# Patient Record
Sex: MALE | URBAN - METROPOLITAN AREA
[De-identification: names, ages, dates, MRNs, and addresses within clinical notes are randomized per-mention and may not be internally consistent; named-entity substitution may affect disease eponyms.]

---

## 2017-01-01 ENCOUNTER — ONCOLOGY VISIT (OUTPATIENT)
Dept: ONCOLOGY | Facility: CLINIC | Age: 42
End: 2017-01-01
Attending: INTERNAL MEDICINE
Payer: COMMERCIAL

## 2017-01-01 ENCOUNTER — CARE COORDINATION (OUTPATIENT)
Dept: ONCOLOGY | Facility: CLINIC | Age: 42
End: 2017-01-01

## 2017-01-01 ENCOUNTER — APPOINTMENT (OUTPATIENT)
Dept: RADIATION ONCOLOGY | Facility: CLINIC | Age: 42
End: 2017-01-01
Attending: RADIOLOGY
Payer: COMMERCIAL

## 2017-01-01 ENCOUNTER — INFUSION THERAPY VISIT (OUTPATIENT)
Dept: ONCOLOGY | Facility: CLINIC | Age: 42
End: 2017-01-01
Attending: NURSE PRACTITIONER
Payer: COMMERCIAL

## 2017-01-01 ENCOUNTER — APPOINTMENT (OUTPATIENT)
Dept: LAB | Facility: CLINIC | Age: 42
End: 2017-01-01
Attending: INTERNAL MEDICINE
Payer: COMMERCIAL

## 2017-01-01 ENCOUNTER — HOSPITAL ENCOUNTER (OUTPATIENT)
Dept: MRI IMAGING | Facility: CLINIC | Age: 42
Discharge: HOME | End: 2017-01-03
Attending: INTERNAL MEDICINE | Admitting: INTERNAL MEDICINE
Payer: COMMERCIAL

## 2017-01-01 ENCOUNTER — HOSPITAL ENCOUNTER (OUTPATIENT)
Dept: MEDSURG UNIT | Facility: CLINIC | Age: 42
End: 2017-03-28
Attending: RADIOLOGY
Payer: COMMERCIAL

## 2017-01-01 ENCOUNTER — HOSPITAL ENCOUNTER (OUTPATIENT)
Dept: MRI IMAGING | Facility: CLINIC | Age: 42
End: 2017-01-03
Attending: INTERNAL MEDICINE
Payer: COMMERCIAL

## 2017-01-01 ENCOUNTER — TELEPHONE (OUTPATIENT)
Dept: ONCOLOGY | Facility: CLINIC | Age: 42
End: 2017-01-01

## 2017-01-01 ENCOUNTER — PRE VISIT (OUTPATIENT)
Dept: RADIATION ONCOLOGY | Facility: CLINIC | Age: 42
End: 2017-01-01

## 2017-01-01 ENCOUNTER — TRANSFERRED RECORDS (OUTPATIENT)
Dept: HEALTH INFORMATION MANAGEMENT | Facility: CLINIC | Age: 42
End: 2017-01-01

## 2017-01-01 ENCOUNTER — ONCOLOGY VISIT (OUTPATIENT)
Dept: RADIATION ONCOLOGY | Facility: CLINIC | Age: 42
End: 2017-01-01

## 2017-01-01 ENCOUNTER — HOSPITAL ENCOUNTER (OUTPATIENT)
Dept: MRI IMAGING | Facility: CLINIC | Age: 42
End: 2017-04-07
Attending: INTERNAL MEDICINE
Payer: COMMERCIAL

## 2017-01-01 ENCOUNTER — HOSPITAL ENCOUNTER (OUTPATIENT)
Dept: MRI IMAGING | Facility: CLINIC | Age: 42
End: 2017-01-03
Attending: NURSE PRACTITIONER
Payer: COMMERCIAL

## 2017-01-01 ENCOUNTER — TELEPHONE (OUTPATIENT)
Dept: PALLIATIVE CARE | Facility: CLINIC | Age: 42
End: 2017-01-01

## 2017-01-01 ENCOUNTER — TELEPHONE (OUTPATIENT)
Dept: RADIATION ONCOLOGY | Facility: CLINIC | Age: 42
End: 2017-01-01

## 2017-01-01 ENCOUNTER — HOSPITAL ENCOUNTER (OUTPATIENT)
Dept: MRI IMAGING | Facility: CLINIC | Age: 42
Discharge: HOME | End: 2017-03-28
Attending: RADIOLOGY | Admitting: RADIOLOGY
Payer: COMMERCIAL

## 2017-01-01 ENCOUNTER — RESEARCH ENCOUNTER (OUTPATIENT)
Dept: ONCOLOGY | Facility: CLINIC | Age: 42
End: 2017-01-01

## 2017-01-01 ENCOUNTER — HOSPITAL ENCOUNTER (OUTPATIENT)
Dept: MRI IMAGING | Facility: CLINIC | Age: 42
Discharge: HOME | End: 2017-03-06
Attending: NURSE PRACTITIONER | Admitting: NURSE PRACTITIONER
Payer: COMMERCIAL

## 2017-01-01 ENCOUNTER — OFFICE VISIT (OUTPATIENT)
Dept: NEUROSURGERY | Facility: CLINIC | Age: 42
End: 2017-01-01
Attending: NEUROLOGICAL SURGERY
Payer: COMMERCIAL

## 2017-01-01 ENCOUNTER — HOSPITAL ENCOUNTER (OUTPATIENT)
Dept: MRI IMAGING | Facility: CLINIC | Age: 42
Discharge: HOME | End: 2017-04-07
Attending: INTERNAL MEDICINE | Admitting: INTERNAL MEDICINE
Payer: COMMERCIAL

## 2017-01-01 ENCOUNTER — HOSPITAL ENCOUNTER (OUTPATIENT)
Dept: PET IMAGING | Facility: CLINIC | Age: 42
Discharge: HOME | End: 2017-02-06
Attending: INTERNAL MEDICINE | Admitting: INTERNAL MEDICINE
Payer: COMMERCIAL

## 2017-01-01 ENCOUNTER — OFFICE VISIT (OUTPATIENT)
Dept: PALLIATIVE CARE | Facility: CLINIC | Age: 42
End: 2017-01-01
Attending: FAMILY MEDICINE
Payer: COMMERCIAL

## 2017-01-01 VITALS
HEART RATE: 112 BPM | WEIGHT: 221.7 LBS | SYSTOLIC BLOOD PRESSURE: 103 MMHG | OXYGEN SATURATION: 99 % | DIASTOLIC BLOOD PRESSURE: 66 MMHG | BODY MASS INDEX: 30.07 KG/M2

## 2017-01-01 VITALS
WEIGHT: 229.5 LBS | DIASTOLIC BLOOD PRESSURE: 84 MMHG | HEART RATE: 107 BPM | TEMPERATURE: 97.9 F | SYSTOLIC BLOOD PRESSURE: 133 MMHG | OXYGEN SATURATION: 100 % | BODY MASS INDEX: 29.47 KG/M2

## 2017-01-01 VITALS
BODY MASS INDEX: 32.44 KG/M2 | HEIGHT: 74 IN | OXYGEN SATURATION: 100 % | RESPIRATION RATE: 20 BRPM | SYSTOLIC BLOOD PRESSURE: 130 MMHG | DIASTOLIC BLOOD PRESSURE: 85 MMHG | WEIGHT: 252.8 LBS | HEART RATE: 100 BPM | TEMPERATURE: 97 F

## 2017-01-01 VITALS
TEMPERATURE: 96.7 F | OXYGEN SATURATION: 98 % | DIASTOLIC BLOOD PRESSURE: 98 MMHG | HEART RATE: 129 BPM | SYSTOLIC BLOOD PRESSURE: 146 MMHG | BODY MASS INDEX: 29.34 KG/M2 | RESPIRATION RATE: 16 BRPM | WEIGHT: 228.5 LBS

## 2017-01-01 VITALS
BODY MASS INDEX: 32.67 KG/M2 | DIASTOLIC BLOOD PRESSURE: 85 MMHG | OXYGEN SATURATION: 99 % | WEIGHT: 254.6 LBS | SYSTOLIC BLOOD PRESSURE: 125 MMHG | HEART RATE: 91 BPM | TEMPERATURE: 97.4 F | RESPIRATION RATE: 18 BRPM

## 2017-01-01 VITALS
OXYGEN SATURATION: 99 % | TEMPERATURE: 98.3 F | SYSTOLIC BLOOD PRESSURE: 116 MMHG | HEART RATE: 91 BPM | RESPIRATION RATE: 16 BRPM | DIASTOLIC BLOOD PRESSURE: 82 MMHG

## 2017-01-01 VITALS
DIASTOLIC BLOOD PRESSURE: 84 MMHG | WEIGHT: 241.6 LBS | TEMPERATURE: 98.3 F | OXYGEN SATURATION: 99 % | SYSTOLIC BLOOD PRESSURE: 141 MMHG | RESPIRATION RATE: 16 BRPM | BODY MASS INDEX: 31.01 KG/M2 | HEART RATE: 120 BPM

## 2017-01-01 VITALS
TEMPERATURE: 96.7 F | HEART RATE: 127 BPM | SYSTOLIC BLOOD PRESSURE: 126 MMHG | HEIGHT: 72 IN | OXYGEN SATURATION: 99 % | DIASTOLIC BLOOD PRESSURE: 82 MMHG | RESPIRATION RATE: 18 BRPM | WEIGHT: 244.3 LBS | BODY MASS INDEX: 33.09 KG/M2

## 2017-01-01 VITALS
OXYGEN SATURATION: 100 % | SYSTOLIC BLOOD PRESSURE: 124 MMHG | TEMPERATURE: 98.4 F | DIASTOLIC BLOOD PRESSURE: 76 MMHG | RESPIRATION RATE: 16 BRPM | HEART RATE: 103 BPM

## 2017-01-01 VITALS — WEIGHT: 215 LBS | BODY MASS INDEX: 29.16 KG/M2

## 2017-01-01 VITALS
RESPIRATION RATE: 16 BRPM | OXYGEN SATURATION: 99 % | TEMPERATURE: 98.4 F | DIASTOLIC BLOOD PRESSURE: 78 MMHG | HEART RATE: 90 BPM | SYSTOLIC BLOOD PRESSURE: 112 MMHG

## 2017-01-01 VITALS
DIASTOLIC BLOOD PRESSURE: 74 MMHG | SYSTOLIC BLOOD PRESSURE: 134 MMHG | HEART RATE: 99 BPM | OXYGEN SATURATION: 99 % | TEMPERATURE: 98.3 F | RESPIRATION RATE: 16 BRPM

## 2017-01-01 VITALS
SYSTOLIC BLOOD PRESSURE: 135 MMHG | TEMPERATURE: 97.5 F | HEART RATE: 115 BPM | OXYGEN SATURATION: 97 % | BODY MASS INDEX: 29.54 KG/M2 | DIASTOLIC BLOOD PRESSURE: 95 MMHG | HEIGHT: 74 IN | WEIGHT: 230.2 LBS | RESPIRATION RATE: 20 BRPM

## 2017-01-01 VITALS
SYSTOLIC BLOOD PRESSURE: 139 MMHG | HEART RATE: 131 BPM | TEMPERATURE: 96.1 F | RESPIRATION RATE: 16 BRPM | DIASTOLIC BLOOD PRESSURE: 82 MMHG | BODY MASS INDEX: 29.53 KG/M2 | OXYGEN SATURATION: 99 % | WEIGHT: 230 LBS

## 2017-01-01 VITALS
WEIGHT: 233 LBS | SYSTOLIC BLOOD PRESSURE: 147 MMHG | HEART RATE: 106 BPM | BODY MASS INDEX: 31.6 KG/M2 | DIASTOLIC BLOOD PRESSURE: 96 MMHG | OXYGEN SATURATION: 100 % | TEMPERATURE: 98 F

## 2017-01-01 VITALS
OXYGEN SATURATION: 98 % | TEMPERATURE: 97.5 F | WEIGHT: 249.5 LBS | DIASTOLIC BLOOD PRESSURE: 88 MMHG | RESPIRATION RATE: 16 BRPM | BODY MASS INDEX: 32.02 KG/M2 | HEART RATE: 90 BPM | SYSTOLIC BLOOD PRESSURE: 128 MMHG

## 2017-01-01 VITALS
RESPIRATION RATE: 20 BRPM | OXYGEN SATURATION: 98 % | TEMPERATURE: 98.6 F | WEIGHT: 256.4 LBS | HEART RATE: 89 BPM | SYSTOLIC BLOOD PRESSURE: 126 MMHG | BODY MASS INDEX: 32.91 KG/M2 | DIASTOLIC BLOOD PRESSURE: 88 MMHG

## 2017-01-01 VITALS
DIASTOLIC BLOOD PRESSURE: 82 MMHG | SYSTOLIC BLOOD PRESSURE: 127 MMHG | BODY MASS INDEX: 32.64 KG/M2 | HEART RATE: 99 BPM | TEMPERATURE: 98.1 F | RESPIRATION RATE: 18 BRPM | OXYGEN SATURATION: 98 % | WEIGHT: 254.3 LBS

## 2017-01-01 VITALS
DIASTOLIC BLOOD PRESSURE: 95 MMHG | SYSTOLIC BLOOD PRESSURE: 142 MMHG | RESPIRATION RATE: 16 BRPM | HEART RATE: 119 BPM | OXYGEN SATURATION: 100 %

## 2017-01-01 VITALS
BODY MASS INDEX: 31.33 KG/M2 | TEMPERATURE: 97.9 F | OXYGEN SATURATION: 95 % | DIASTOLIC BLOOD PRESSURE: 90 MMHG | WEIGHT: 231 LBS | HEART RATE: 118 BPM | SYSTOLIC BLOOD PRESSURE: 135 MMHG

## 2017-01-01 VITALS — DIASTOLIC BLOOD PRESSURE: 84 MMHG | SYSTOLIC BLOOD PRESSURE: 136 MMHG | HEART RATE: 98 BPM

## 2017-01-01 VITALS
TEMPERATURE: 97.5 F | WEIGHT: 231.2 LBS | DIASTOLIC BLOOD PRESSURE: 95 MMHG | BODY MASS INDEX: 29.68 KG/M2 | OXYGEN SATURATION: 97 % | SYSTOLIC BLOOD PRESSURE: 135 MMHG | HEART RATE: 115 BPM | RESPIRATION RATE: 20 BRPM

## 2017-01-01 VITALS
WEIGHT: 236.6 LBS | TEMPERATURE: 97.9 F | BODY MASS INDEX: 30.36 KG/M2 | OXYGEN SATURATION: 98 % | DIASTOLIC BLOOD PRESSURE: 78 MMHG | HEART RATE: 123 BPM | RESPIRATION RATE: 18 BRPM | SYSTOLIC BLOOD PRESSURE: 117 MMHG | HEIGHT: 74 IN

## 2017-01-01 VITALS
WEIGHT: 240 LBS | BODY MASS INDEX: 30.8 KG/M2 | SYSTOLIC BLOOD PRESSURE: 131 MMHG | HEART RATE: 121 BPM | RESPIRATION RATE: 16 BRPM | HEIGHT: 74 IN | OXYGEN SATURATION: 98 % | DIASTOLIC BLOOD PRESSURE: 74 MMHG

## 2017-01-01 VITALS
OXYGEN SATURATION: 100 % | HEIGHT: 74 IN | DIASTOLIC BLOOD PRESSURE: 89 MMHG | SYSTOLIC BLOOD PRESSURE: 137 MMHG | TEMPERATURE: 98 F | WEIGHT: 234.7 LBS | BODY MASS INDEX: 30.12 KG/M2 | HEART RATE: 109 BPM

## 2017-01-01 VITALS
HEART RATE: 108 BPM | SYSTOLIC BLOOD PRESSURE: 133 MMHG | BODY MASS INDEX: 32.02 KG/M2 | TEMPERATURE: 98.9 F | DIASTOLIC BLOOD PRESSURE: 80 MMHG | OXYGEN SATURATION: 97 % | WEIGHT: 249.5 LBS | RESPIRATION RATE: 16 BRPM

## 2017-01-01 VITALS
DIASTOLIC BLOOD PRESSURE: 91 MMHG | SYSTOLIC BLOOD PRESSURE: 150 MMHG | RESPIRATION RATE: 16 BRPM | HEART RATE: 80 BPM | TEMPERATURE: 96.3 F | OXYGEN SATURATION: 98 %

## 2017-01-01 VITALS
DIASTOLIC BLOOD PRESSURE: 88 MMHG | OXYGEN SATURATION: 99 % | SYSTOLIC BLOOD PRESSURE: 134 MMHG | HEART RATE: 113 BPM | RESPIRATION RATE: 16 BRPM

## 2017-01-01 VITALS
HEIGHT: 74 IN | TEMPERATURE: 98.1 F | HEART RATE: 122 BPM | WEIGHT: 249.2 LBS | DIASTOLIC BLOOD PRESSURE: 87 MMHG | BODY MASS INDEX: 31.98 KG/M2 | SYSTOLIC BLOOD PRESSURE: 124 MMHG | OXYGEN SATURATION: 100 %

## 2017-01-01 VITALS — BODY MASS INDEX: 32.34 KG/M2 | WEIGHT: 252 LBS

## 2017-01-01 DIAGNOSIS — M54.50 LEFT-SIDED LOW BACK PAIN WITHOUT SCIATICA, UNSPECIFIED CHRONICITY: ICD-10-CM

## 2017-01-01 DIAGNOSIS — C79.51 BONE METASTASIS: ICD-10-CM

## 2017-01-01 DIAGNOSIS — C34.90 NON-SMALL CELL LUNG CANCER (NSCLC) (H): ICD-10-CM

## 2017-01-01 DIAGNOSIS — C34.90 NON-SMALL CELL LUNG CANCER (NSCLC) (H): Primary | ICD-10-CM

## 2017-01-01 DIAGNOSIS — C34.90 NON-SMALL CELL LUNG CANCER, UNSPECIFIED LATERALITY (H): ICD-10-CM

## 2017-01-01 DIAGNOSIS — C79.31 METASTASIS TO BRAIN (H): Primary | ICD-10-CM

## 2017-01-01 DIAGNOSIS — M54.42 ACUTE LEFT-SIDED LOW BACK PAIN WITH LEFT-SIDED SCIATICA: ICD-10-CM

## 2017-01-01 DIAGNOSIS — C79.31 BRAIN METASTASIS: Primary | ICD-10-CM

## 2017-01-01 DIAGNOSIS — C79.31 BRAIN METASTASIS: ICD-10-CM

## 2017-01-01 DIAGNOSIS — C79.31 METASTASIS TO BRAIN (H): ICD-10-CM

## 2017-01-01 DIAGNOSIS — G89.3 CANCER RELATED PAIN: ICD-10-CM

## 2017-01-01 DIAGNOSIS — C79.51 BONE METASTASIS: Primary | ICD-10-CM

## 2017-01-01 DIAGNOSIS — M54.50 LEFT-SIDED LOW BACK PAIN WITHOUT SCIATICA, UNSPECIFIED CHRONICITY: Primary | ICD-10-CM

## 2017-01-01 DIAGNOSIS — C34.90 LUNG CANCER (H): ICD-10-CM

## 2017-01-01 DIAGNOSIS — M54.42 ACUTE LEFT-SIDED LOW BACK PAIN WITH LEFT-SIDED SCIATICA: Primary | ICD-10-CM

## 2017-01-01 LAB
ABO + RH BLD: NORMAL
ALBUMIN SERPL-MCNC: 2.9 G/DL (ref 3.4–5)
ALBUMIN SERPL-MCNC: 3 G/DL (ref 3.4–5)
ALBUMIN SERPL-MCNC: 3 G/DL (ref 3.4–5)
ALBUMIN SERPL-MCNC: 3.1 G/DL (ref 3.4–5)
ALBUMIN SERPL-MCNC: 3.1 G/DL (ref 3.4–5)
ALBUMIN SERPL-MCNC: 3.3 G/DL (ref 3.4–5)
ALBUMIN SERPL-MCNC: 3.4 G/DL (ref 3.4–5)
ALP SERPL-CCNC: 107 U/L (ref 40–150)
ALP SERPL-CCNC: 58 U/L (ref 40–150)
ALP SERPL-CCNC: 60 U/L (ref 40–150)
ALP SERPL-CCNC: 60 U/L (ref 40–150)
ALP SERPL-CCNC: 61 U/L (ref 40–150)
ALP SERPL-CCNC: 66 U/L (ref 40–150)
ALP SERPL-CCNC: 69 U/L (ref 40–150)
ALT SERPL W P-5'-P-CCNC: 13 U/L (ref 0–70)
ALT SERPL W P-5'-P-CCNC: 17 U/L (ref 0–70)
ALT SERPL W P-5'-P-CCNC: 17 U/L (ref 0–70)
ALT SERPL W P-5'-P-CCNC: 18 U/L (ref 0–70)
ALT SERPL W P-5'-P-CCNC: 19 U/L (ref 0–70)
ALT SERPL W P-5'-P-CCNC: 19 U/L (ref 0–70)
ALT SERPL W P-5'-P-CCNC: 32 U/L (ref 0–70)
AMYLASE SERPL-CCNC: 45 U/L (ref 30–110)
ANION GAP SERPL CALCULATED.3IONS-SCNC: 6 MMOL/L (ref 3–14)
ANION GAP SERPL CALCULATED.3IONS-SCNC: 8 MMOL/L (ref 3–14)
ANION GAP SERPL CALCULATED.3IONS-SCNC: 9 MMOL/L (ref 3–14)
ANISOCYTOSIS BLD QL SMEAR: ABNORMAL
AST SERPL W P-5'-P-CCNC: 20 U/L (ref 0–45)
AST SERPL W P-5'-P-CCNC: 20 U/L (ref 0–45)
AST SERPL W P-5'-P-CCNC: 22 U/L (ref 0–45)
AST SERPL W P-5'-P-CCNC: 23 U/L (ref 0–45)
AST SERPL W P-5'-P-CCNC: 31 U/L (ref 0–45)
BASOPHILS # BLD AUTO: 0 10E9/L (ref 0–0.2)
BASOPHILS # BLD AUTO: 0.1 10E9/L (ref 0–0.2)
BASOPHILS NFR BLD AUTO: 0 %
BASOPHILS NFR BLD AUTO: 0 %
BASOPHILS NFR BLD AUTO: 0.1 %
BASOPHILS NFR BLD AUTO: 0.1 %
BASOPHILS NFR BLD AUTO: 0.3 %
BASOPHILS NFR BLD AUTO: 0.5 %
BASOPHILS NFR BLD AUTO: 0.7 %
BILIRUB DIRECT SERPL-MCNC: <0.1 MG/DL (ref 0–0.2)
BILIRUB DIRECT SERPL-MCNC: <0.1 MG/DL (ref 0–0.2)
BILIRUB SERPL-MCNC: 0.2 MG/DL (ref 0.2–1.3)
BILIRUB SERPL-MCNC: 0.3 MG/DL (ref 0.2–1.3)
BILIRUB SERPL-MCNC: 0.6 MG/DL (ref 0.2–1.3)
BLD GP AB SCN SERPL QL: NORMAL
BLD GP AB SCN SERPL QL: NORMAL
BLD PROD TYP BPU: NORMAL
BLD UNIT ID BPU: 0
BLD UNIT ID BPU: 0
BLOOD BANK CMNT PATIENT-IMP: NORMAL
BLOOD PRODUCT CODE: NORMAL
BLOOD PRODUCT CODE: NORMAL
BPU ID: NORMAL
BPU ID: NORMAL
BUN SERPL-MCNC: 13 MG/DL (ref 7–30)
BUN SERPL-MCNC: 16 MG/DL (ref 7–30)
BUN SERPL-MCNC: 33 MG/DL (ref 7–30)
CALCIUM SERPL-MCNC: 8.9 MG/DL (ref 8.5–10.1)
CALCIUM SERPL-MCNC: 9 MG/DL (ref 8.5–10.1)
CALCIUM SERPL-MCNC: 9 MG/DL (ref 8.5–10.1)
CALCIUM SERPL-MCNC: 9.1 MG/DL (ref 8.5–10.1)
CALCIUM SERPL-MCNC: 9.2 MG/DL (ref 8.5–10.1)
CALCIUM SERPL-MCNC: 9.3 MG/DL (ref 8.5–10.1)
CHLORIDE SERPL-SCNC: 100 MMOL/L (ref 94–109)
CHLORIDE SERPL-SCNC: 100 MMOL/L (ref 94–109)
CHLORIDE SERPL-SCNC: 102 MMOL/L (ref 94–109)
CHLORIDE SERPL-SCNC: 103 MMOL/L (ref 94–109)
CHLORIDE SERPL-SCNC: 104 MMOL/L (ref 94–109)
CO2 SERPL-SCNC: 25 MMOL/L (ref 20–32)
CO2 SERPL-SCNC: 25 MMOL/L (ref 20–32)
CO2 SERPL-SCNC: 26 MMOL/L (ref 20–32)
CREAT SERPL-MCNC: 0.63 MG/DL (ref 0.66–1.25)
CREAT SERPL-MCNC: 0.63 MG/DL (ref 0.66–1.25)
CREAT SERPL-MCNC: 0.66 MG/DL (ref 0.66–1.25)
CREAT SERPL-MCNC: 0.66 MG/DL (ref 0.66–1.25)
CREAT SERPL-MCNC: 0.72 MG/DL (ref 0.66–1.25)
CREAT SERPL-MCNC: 0.74 MG/DL (ref 0.66–1.25)
DIFFERENTIAL METHOD BLD: ABNORMAL
EOSINOPHIL # BLD AUTO: 0 10E9/L (ref 0–0.7)
EOSINOPHIL # BLD AUTO: 0.2 10E9/L (ref 0–0.7)
EOSINOPHIL # BLD AUTO: 0.3 10E9/L (ref 0–0.7)
EOSINOPHIL NFR BLD AUTO: 0 %
EOSINOPHIL NFR BLD AUTO: 0.1 %
EOSINOPHIL NFR BLD AUTO: 0.2 %
EOSINOPHIL NFR BLD AUTO: 1.9 %
EOSINOPHIL NFR BLD AUTO: 3.7 %
EOSINOPHIL NFR BLD AUTO: 3.7 %
EOSINOPHIL NFR BLD AUTO: 4.5 %
ERYTHROCYTE [DISTWIDTH] IN BLOOD BY AUTOMATED COUNT: 14 % (ref 10–15)
ERYTHROCYTE [DISTWIDTH] IN BLOOD BY AUTOMATED COUNT: 14.2 % (ref 10–15)
ERYTHROCYTE [DISTWIDTH] IN BLOOD BY AUTOMATED COUNT: 14.3 % (ref 10–15)
ERYTHROCYTE [DISTWIDTH] IN BLOOD BY AUTOMATED COUNT: 14.5 % (ref 10–15)
ERYTHROCYTE [DISTWIDTH] IN BLOOD BY AUTOMATED COUNT: 14.9 % (ref 10–15)
ERYTHROCYTE [DISTWIDTH] IN BLOOD BY AUTOMATED COUNT: 16.1 % (ref 10–15)
ERYTHROCYTE [DISTWIDTH] IN BLOOD BY AUTOMATED COUNT: 19.9 % (ref 10–15)
ERYTHROCYTE [DISTWIDTH] IN BLOOD BY AUTOMATED COUNT: 20.9 % (ref 10–15)
ERYTHROCYTE [DISTWIDTH] IN BLOOD BY AUTOMATED COUNT: 21.8 % (ref 10–15)
GFR SERPL CREATININE-BSD FRML MDRD: ABNORMAL ML/MIN/1.7M2
GLUCOSE BLDC GLUCOMTR-MCNC: 91 MG/DL (ref 70–99)
GLUCOSE SERPL-MCNC: 61 MG/DL (ref 70–99)
GLUCOSE SERPL-MCNC: 85 MG/DL (ref 70–99)
GLUCOSE SERPL-MCNC: 88 MG/DL (ref 70–99)
GLUCOSE SERPL-MCNC: 92 MG/DL (ref 70–99)
GLUCOSE SERPL-MCNC: 95 MG/DL (ref 70–99)
HCT VFR BLD AUTO: 21.6 % (ref 40–53)
HCT VFR BLD AUTO: 27.5 % (ref 40–53)
HCT VFR BLD AUTO: 27.5 % (ref 40–53)
HCT VFR BLD AUTO: 27.8 % (ref 40–53)
HCT VFR BLD AUTO: 28.6 % (ref 40–53)
HCT VFR BLD AUTO: 29.2 % (ref 40–53)
HCT VFR BLD AUTO: 30 % (ref 40–53)
HCT VFR BLD AUTO: 30.8 % (ref 40–53)
HCT VFR BLD AUTO: 32.1 % (ref 40–53)
HGB BLD-MCNC: 6.4 G/DL (ref 13.3–17.7)
HGB BLD-MCNC: 8.5 G/DL (ref 13.3–17.7)
HGB BLD-MCNC: 8.6 G/DL (ref 13.3–17.7)
HGB BLD-MCNC: 8.8 G/DL (ref 13.3–17.7)
HGB BLD-MCNC: 9 G/DL (ref 13.3–17.7)
HGB BLD-MCNC: 9.1 G/DL (ref 13.3–17.7)
HGB BLD-MCNC: 9.4 G/DL (ref 13.3–17.7)
HGB BLD-MCNC: 9.8 G/DL (ref 13.3–17.7)
HGB BLD-MCNC: 9.9 G/DL (ref 13.3–17.7)
IMM GRANULOCYTES # BLD: 0 10E9/L (ref 0–0.4)
IMM GRANULOCYTES # BLD: 0.1 10E9/L (ref 0–0.4)
IMM GRANULOCYTES # BLD: 0.8 10E9/L (ref 0–0.4)
IMM GRANULOCYTES NFR BLD: 0.3 %
IMM GRANULOCYTES NFR BLD: 0.4 %
IMM GRANULOCYTES NFR BLD: 0.5 %
IMM GRANULOCYTES NFR BLD: 0.5 %
IMM GRANULOCYTES NFR BLD: 0.7 %
IMM GRANULOCYTES NFR BLD: 0.8 %
IMM GRANULOCYTES NFR BLD: 1 %
IMM GRANULOCYTES NFR BLD: 3.9 %
LDH SERPL L TO P-CCNC: 391 U/L (ref 85–227)
LDH SERPL L TO P-CCNC: 402 U/L (ref 85–227)
LDH SERPL L TO P-CCNC: 407 U/L (ref 85–227)
LDH SERPL L TO P-CCNC: 437 U/L (ref 85–227)
LDH SERPL L TO P-CCNC: 489 U/L (ref 85–227)
LIPASE SERPL-CCNC: 86 U/L (ref 73–393)
LYMPHOCYTES # BLD AUTO: 0.4 10E9/L (ref 0.8–5.3)
LYMPHOCYTES # BLD AUTO: 0.5 10E9/L (ref 0.8–5.3)
LYMPHOCYTES # BLD AUTO: 0.6 10E9/L (ref 0.8–5.3)
LYMPHOCYTES # BLD AUTO: 0.8 10E9/L (ref 0.8–5.3)
LYMPHOCYTES # BLD AUTO: 1.4 10E9/L (ref 0.8–5.3)
LYMPHOCYTES # BLD AUTO: 2 10E9/L (ref 0.8–5.3)
LYMPHOCYTES # BLD AUTO: 2 10E9/L (ref 0.8–5.3)
LYMPHOCYTES NFR BLD AUTO: 11 %
LYMPHOCYTES NFR BLD AUTO: 11.3 %
LYMPHOCYTES NFR BLD AUTO: 12.9 %
LYMPHOCYTES NFR BLD AUTO: 23 %
LYMPHOCYTES NFR BLD AUTO: 25.1 %
LYMPHOCYTES NFR BLD AUTO: 3.5 %
LYMPHOCYTES NFR BLD AUTO: 6.9 %
LYMPHOCYTES NFR BLD AUTO: 8.6 %
LYMPHOCYTES NFR BLD AUTO: 8.6 %
MAGNESIUM SERPL-MCNC: 1.9 MG/DL (ref 1.6–2.3)
MAGNESIUM SERPL-MCNC: 2 MG/DL (ref 1.6–2.3)
MAGNESIUM SERPL-MCNC: 2.1 MG/DL (ref 1.6–2.3)
MAGNESIUM SERPL-MCNC: 2.2 MG/DL (ref 1.6–2.3)
MAGNESIUM SERPL-MCNC: 2.3 MG/DL (ref 1.6–2.3)
MCH RBC QN AUTO: 25.1 PG (ref 26.5–33)
MCH RBC QN AUTO: 25.6 PG (ref 26.5–33)
MCH RBC QN AUTO: 26.1 PG (ref 26.5–33)
MCH RBC QN AUTO: 26.2 PG (ref 26.5–33)
MCH RBC QN AUTO: 26.5 PG (ref 26.5–33)
MCH RBC QN AUTO: 26.8 PG (ref 26.5–33)
MCH RBC QN AUTO: 26.8 PG (ref 26.5–33)
MCH RBC QN AUTO: 27.1 PG (ref 26.5–33)
MCH RBC QN AUTO: 27.9 PG (ref 26.5–33)
MCHC RBC AUTO-ENTMCNC: 29.6 G/DL (ref 31.5–36.5)
MCHC RBC AUTO-ENTMCNC: 30.5 G/DL (ref 31.5–36.5)
MCHC RBC AUTO-ENTMCNC: 30.9 G/DL (ref 31.5–36.5)
MCHC RBC AUTO-ENTMCNC: 31.2 G/DL (ref 31.5–36.5)
MCHC RBC AUTO-ENTMCNC: 31.3 G/DL (ref 31.5–36.5)
MCHC RBC AUTO-ENTMCNC: 31.3 G/DL (ref 31.5–36.5)
MCHC RBC AUTO-ENTMCNC: 31.5 G/DL (ref 31.5–36.5)
MCHC RBC AUTO-ENTMCNC: 31.7 G/DL (ref 31.5–36.5)
MCHC RBC AUTO-ENTMCNC: 32.1 G/DL (ref 31.5–36.5)
MCV RBC AUTO: 81 FL (ref 78–100)
MCV RBC AUTO: 84 FL (ref 78–100)
MCV RBC AUTO: 85 FL (ref 78–100)
MCV RBC AUTO: 87 FL (ref 78–100)
MCV RBC AUTO: 89 FL (ref 78–100)
MCV RBC AUTO: 89 FL (ref 78–100)
MONOCYTES # BLD AUTO: 0.1 10E9/L (ref 0–1.3)
MONOCYTES # BLD AUTO: 0.3 10E9/L (ref 0–1.3)
MONOCYTES # BLD AUTO: 0.4 10E9/L (ref 0–1.3)
MONOCYTES # BLD AUTO: 0.5 10E9/L (ref 0–1.3)
MONOCYTES # BLD AUTO: 0.5 10E9/L (ref 0–1.3)
MONOCYTES # BLD AUTO: 0.6 10E9/L (ref 0–1.3)
MONOCYTES # BLD AUTO: 0.8 10E9/L (ref 0–1.3)
MONOCYTES # BLD AUTO: 0.9 10E9/L (ref 0–1.3)
MONOCYTES # BLD AUTO: 1.2 10E9/L (ref 0–1.3)
MONOCYTES NFR BLD AUTO: 10.2 %
MONOCYTES NFR BLD AUTO: 10.2 %
MONOCYTES NFR BLD AUTO: 11.1 %
MONOCYTES NFR BLD AUTO: 2.2 %
MONOCYTES NFR BLD AUTO: 4.7 %
MONOCYTES NFR BLD AUTO: 5.8 %
MONOCYTES NFR BLD AUTO: 6 %
MONOCYTES NFR BLD AUTO: 7.3 %
MONOCYTES NFR BLD AUTO: 8.8 %
NEUTROPHILS # BLD AUTO: 16.7 10E9/L (ref 1.6–8.3)
NEUTROPHILS # BLD AUTO: 2.9 10E9/L (ref 1.6–8.3)
NEUTROPHILS # BLD AUTO: 3.2 10E9/L (ref 1.6–8.3)
NEUTROPHILS # BLD AUTO: 4.7 10E9/L (ref 1.6–8.3)
NEUTROPHILS # BLD AUTO: 5.1 10E9/L (ref 1.6–8.3)
NEUTROPHILS # BLD AUTO: 5.7 10E9/L (ref 1.6–8.3)
NEUTROPHILS # BLD AUTO: 6.1 10E9/L (ref 1.6–8.3)
NEUTROPHILS # BLD AUTO: 6.2 10E9/L (ref 1.6–8.3)
NEUTROPHILS # BLD AUTO: 9.4 10E9/L (ref 1.6–8.3)
NEUTROPHILS NFR BLD AUTO: 60.1 %
NEUTROPHILS NFR BLD AUTO: 66.8 %
NEUTROPHILS NFR BLD AUTO: 71.3 %
NEUTROPHILS NFR BLD AUTO: 73 %
NEUTROPHILS NFR BLD AUTO: 83.2 %
NEUTROPHILS NFR BLD AUTO: 83.4 %
NEUTROPHILS NFR BLD AUTO: 84.4 %
NEUTROPHILS NFR BLD AUTO: 87.7 %
NEUTROPHILS NFR BLD AUTO: 88 %
NRBC # BLD AUTO: 0 10*3/UL
NRBC # BLD AUTO: 0.1 10*3/UL
NRBC BLD AUTO-RTO: 0 /100
NRBC BLD AUTO-RTO: 1 /100
NUM BPU REQUESTED: 1
NUM BPU REQUESTED: 1
OVALOCYTES BLD QL SMEAR: SLIGHT
PHOSPHATE SERPL-MCNC: 3.4 MG/DL (ref 2.5–4.5)
PHOSPHATE SERPL-MCNC: 3.4 MG/DL (ref 2.5–4.5)
PHOSPHATE SERPL-MCNC: 3.9 MG/DL (ref 2.5–4.5)
PHOSPHATE SERPL-MCNC: 4 MG/DL (ref 2.5–4.5)
PHOSPHATE SERPL-MCNC: 4.1 MG/DL (ref 2.5–4.5)
PLATELET # BLD AUTO: 156 10E9/L (ref 150–450)
PLATELET # BLD AUTO: 201 10E9/L (ref 150–450)
PLATELET # BLD AUTO: 278 10E9/L (ref 150–450)
PLATELET # BLD AUTO: 341 10E9/L (ref 150–450)
PLATELET # BLD AUTO: 352 10E9/L (ref 150–450)
PLATELET # BLD AUTO: 374 10E9/L (ref 150–450)
PLATELET # BLD AUTO: 386 10E9/L (ref 150–450)
PLATELET # BLD AUTO: 395 10E9/L (ref 150–450)
PLATELET # BLD AUTO: 395 10E9/L (ref 150–450)
POIKILOCYTOSIS BLD QL SMEAR: SLIGHT
POLYCHROMASIA BLD QL SMEAR: ABNORMAL
POTASSIUM SERPL-SCNC: 4.2 MMOL/L (ref 3.4–5.3)
POTASSIUM SERPL-SCNC: 4.2 MMOL/L (ref 3.4–5.3)
POTASSIUM SERPL-SCNC: 4.4 MMOL/L (ref 3.4–5.3)
POTASSIUM SERPL-SCNC: 4.8 MMOL/L (ref 3.4–5.3)
POTASSIUM SERPL-SCNC: 4.8 MMOL/L (ref 3.4–5.3)
PROT SERPL-MCNC: 7.6 G/DL (ref 6.8–8.8)
PROT SERPL-MCNC: 8.1 G/DL (ref 6.8–8.8)
PROT SERPL-MCNC: 8.4 G/DL (ref 6.8–8.8)
PROT SERPL-MCNC: 8.4 G/DL (ref 6.8–8.8)
PROT SERPL-MCNC: 9.2 G/DL (ref 6.8–8.8)
PROT SERPL-MCNC: 9.2 G/DL (ref 6.8–8.8)
PROT SERPL-MCNC: 9.4 G/DL (ref 6.8–8.8)
RBC # BLD AUTO: 2.44 10E12/L (ref 4.4–5.9)
RBC # BLD AUTO: 3.28 10E12/L (ref 4.4–5.9)
RBC # BLD AUTO: 3.29 10E12/L (ref 4.4–5.9)
RBC # BLD AUTO: 3.36 10E12/L (ref 4.4–5.9)
RBC # BLD AUTO: 3.37 10E12/L (ref 4.4–5.9)
RBC # BLD AUTO: 3.39 10E12/L (ref 4.4–5.9)
RBC # BLD AUTO: 3.39 10E12/L (ref 4.4–5.9)
RBC # BLD AUTO: 3.69 10E12/L (ref 4.4–5.9)
RBC # BLD AUTO: 3.83 10E12/L (ref 4.4–5.9)
SODIUM SERPL-SCNC: 134 MMOL/L (ref 133–144)
SODIUM SERPL-SCNC: 134 MMOL/L (ref 133–144)
SODIUM SERPL-SCNC: 135 MMOL/L (ref 133–144)
SODIUM SERPL-SCNC: 136 MMOL/L (ref 133–144)
SODIUM SERPL-SCNC: 136 MMOL/L (ref 133–144)
SPECIMEN EXP DATE BLD: NORMAL
SPECIMEN EXP DATE BLD: NORMAL
T4 FREE SERPL-MCNC: 1.34 NG/DL (ref 0.76–1.46)
TRANSFUSION STATUS PATIENT QL: NORMAL
TSH SERPL DL<=0.05 MIU/L-ACNC: 0.87 MU/L (ref 0.4–4)
WBC # BLD AUTO: 10.7 10E9/L (ref 4–11)
WBC # BLD AUTO: 20.1 10E9/L (ref 4–11)
WBC # BLD AUTO: 4 10E9/L (ref 4–11)
WBC # BLD AUTO: 4.4 10E9/L (ref 4–11)
WBC # BLD AUTO: 5.8 10E9/L (ref 4–11)
WBC # BLD AUTO: 7.3 10E9/L (ref 4–11)
WBC # BLD AUTO: 7.4 10E9/L (ref 4–11)
WBC # BLD AUTO: 7.9 10E9/L (ref 4–11)
WBC # BLD AUTO: 8.6 10E9/L (ref 4–11)

## 2017-01-01 PROCEDURE — 25000125 ZZHC RX 250: Mod: ZF | Performed by: INTERNAL MEDICINE

## 2017-01-01 PROCEDURE — 96413 CHEMO IV INFUSION 1 HR: CPT

## 2017-01-01 PROCEDURE — 86923 COMPATIBILITY TEST ELECTRIC: CPT | Performed by: INTERNAL MEDICINE

## 2017-01-01 PROCEDURE — 77334 RADIATION TREATMENT AID(S): CPT | Performed by: RADIOLOGY

## 2017-01-01 PROCEDURE — 82962 GLUCOSE BLOOD TEST: CPT

## 2017-01-01 PROCEDURE — A9585 GADOBUTROL INJECTION: HCPCS | Performed by: INTERNAL MEDICINE

## 2017-01-01 PROCEDURE — 82150 ASSAY OF AMYLASE: CPT | Performed by: INTERNAL MEDICINE

## 2017-01-01 PROCEDURE — 83615 LACTATE (LD) (LDH) ENZYME: CPT | Performed by: NURSE PRACTITIONER

## 2017-01-01 PROCEDURE — 83615 LACTATE (LD) (LDH) ENZYME: CPT | Performed by: INTERNAL MEDICINE

## 2017-01-01 PROCEDURE — 83735 ASSAY OF MAGNESIUM: CPT | Performed by: NURSE PRACTITIONER

## 2017-01-01 PROCEDURE — 77280 THER RAD SIMULAJ FIELD SMPL: CPT | Performed by: RADIOLOGY

## 2017-01-01 PROCEDURE — 25000128 H RX IP 250 OP 636: Mod: ZF | Performed by: NURSE PRACTITIONER

## 2017-01-01 PROCEDURE — 77412 RADIATION TX DELIVERY LVL 3: CPT | Performed by: RADIOLOGY

## 2017-01-01 PROCEDURE — 85025 COMPLETE CBC W/AUTO DIFF WBC: CPT | Performed by: NURSE PRACTITIONER

## 2017-01-01 PROCEDURE — 96375 TX/PRO/DX INJ NEW DRUG ADDON: CPT

## 2017-01-01 PROCEDURE — 96401 CHEMO ANTI-NEOPL SQ/IM: CPT

## 2017-01-01 PROCEDURE — 25000128 H RX IP 250 OP 636: Mod: ZF | Performed by: INTERNAL MEDICINE

## 2017-01-01 PROCEDURE — 86900 BLOOD TYPING SEROLOGIC ABO: CPT | Performed by: INTERNAL MEDICINE

## 2017-01-01 PROCEDURE — 99214 OFFICE O/P EST MOD 30 MIN: CPT | Mod: 25 | Performed by: RADIOLOGY

## 2017-01-01 PROCEDURE — 36415 COLL VENOUS BLD VENIPUNCTURE: CPT

## 2017-01-01 PROCEDURE — 25500064 ZZH RX 255 OP 636: Performed by: RADIOLOGY

## 2017-01-01 PROCEDURE — 83690 ASSAY OF LIPASE: CPT | Performed by: INTERNAL MEDICINE

## 2017-01-01 PROCEDURE — 99214 OFFICE O/P EST MOD 30 MIN: CPT | Mod: ZP | Performed by: NURSE PRACTITIONER

## 2017-01-01 PROCEDURE — 99214 OFFICE O/P EST MOD 30 MIN: CPT | Performed by: RADIOLOGY

## 2017-01-01 PROCEDURE — 25000130 H RX MED GY IP 250 OP 259 PS 637: Mod: ZF | Performed by: INTERNAL MEDICINE

## 2017-01-01 PROCEDURE — 99204 OFFICE O/P NEW MOD 45 MIN: CPT | Mod: GC | Performed by: FAMILY MEDICINE

## 2017-01-01 PROCEDURE — 85025 COMPLETE CBC W/AUTO DIFF WBC: CPT | Performed by: INTERNAL MEDICINE

## 2017-01-01 PROCEDURE — 34300033 ZZH RX 343: Performed by: INTERNAL MEDICINE

## 2017-01-01 PROCEDURE — 40000556 ZZH STATISTIC PERIPHERAL IV START W US GUIDANCE: Mod: ZF

## 2017-01-01 PROCEDURE — 99212 OFFICE O/P EST SF 10 MIN: CPT | Mod: ZF

## 2017-01-01 PROCEDURE — 77300 RADIATION THERAPY DOSE PLAN: CPT | Performed by: RADIOLOGY

## 2017-01-01 PROCEDURE — 71260 CT THORAX DX C+: CPT

## 2017-01-01 PROCEDURE — 83735 ASSAY OF MAGNESIUM: CPT | Performed by: INTERNAL MEDICINE

## 2017-01-01 PROCEDURE — 86850 RBC ANTIBODY SCREEN: CPT | Performed by: INTERNAL MEDICINE

## 2017-01-01 PROCEDURE — 99212 OFFICE O/P EST SF 10 MIN: CPT

## 2017-01-01 PROCEDURE — 36591 DRAW BLOOD OFF VENOUS DEVICE: CPT

## 2017-01-01 PROCEDURE — 84100 ASSAY OF PHOSPHORUS: CPT | Performed by: INTERNAL MEDICINE

## 2017-01-01 PROCEDURE — 72158 MRI LUMBAR SPINE W/O & W/DYE: CPT

## 2017-01-01 PROCEDURE — 82310 ASSAY OF CALCIUM: CPT | Performed by: INTERNAL MEDICINE

## 2017-01-01 PROCEDURE — 36430 TRANSFUSION BLD/BLD COMPNT: CPT

## 2017-01-01 PROCEDURE — 99214 OFFICE O/P EST MOD 30 MIN: CPT | Mod: ZP | Performed by: INTERNAL MEDICINE

## 2017-01-01 PROCEDURE — 77387 GUIDANCE FOR RADJ TX DLVR: CPT | Performed by: RADIOLOGY

## 2017-01-01 PROCEDURE — 86901 BLOOD TYPING SEROLOGIC RH(D): CPT | Performed by: INTERNAL MEDICINE

## 2017-01-01 PROCEDURE — 77407 RADIATION TX DELIVERY LVL 2: CPT | Performed by: RADIOLOGY

## 2017-01-01 PROCEDURE — 25000132 ZZH RX MED GY IP 250 OP 250 PS 637: Mod: ZF | Performed by: INTERNAL MEDICINE

## 2017-01-01 PROCEDURE — 96367 TX/PROPH/DG ADDL SEQ IV INF: CPT

## 2017-01-01 PROCEDURE — 77370 RADIATION PHYSICS CONSULT: CPT | Performed by: RADIOLOGY

## 2017-01-01 PROCEDURE — 99214 OFFICE O/P EST MOD 30 MIN: CPT

## 2017-01-01 PROCEDURE — 80076 HEPATIC FUNCTION PANEL: CPT | Performed by: INTERNAL MEDICINE

## 2017-01-01 PROCEDURE — 99215 OFFICE O/P EST HI 40 MIN: CPT | Mod: ZP | Performed by: INTERNAL MEDICINE

## 2017-01-01 PROCEDURE — 80053 COMPREHEN METABOLIC PANEL: CPT | Performed by: INTERNAL MEDICINE

## 2017-01-01 PROCEDURE — 40000141 ZZH STATISTIC PERIPHERAL IV START W/O US GUIDANCE: Mod: ZF

## 2017-01-01 PROCEDURE — 72157 MRI CHEST SPINE W/O & W/DYE: CPT

## 2017-01-01 PROCEDURE — 74177 CT ABD & PELVIS W/CONTRAST: CPT

## 2017-01-01 PROCEDURE — 25500045 ZZH RX 255: Performed by: INTERNAL MEDICINE

## 2017-01-01 PROCEDURE — 82247 BILIRUBIN TOTAL: CPT | Performed by: INTERNAL MEDICINE

## 2017-01-01 PROCEDURE — A9585 GADOBUTROL INJECTION: HCPCS | Performed by: NURSE PRACTITIONER

## 2017-01-01 PROCEDURE — 84439 ASSAY OF FREE THYROXINE: CPT | Performed by: INTERNAL MEDICINE

## 2017-01-01 PROCEDURE — 77290 THER RAD SIMULAJ FIELD CPLX: CPT | Performed by: RADIOLOGY

## 2017-01-01 PROCEDURE — 25500064 ZZH RX 255 OP 636: Performed by: NURSE PRACTITIONER

## 2017-01-01 PROCEDURE — 40000172 ZZH STATISTIC PROCEDURE PREP ONLY

## 2017-01-01 PROCEDURE — 25000125 ZZHC RX 250: Mod: ZF | Performed by: RADIOLOGY

## 2017-01-01 PROCEDURE — 77470 SPECIAL RADIATION TREATMENT: CPT | Performed by: RADIOLOGY

## 2017-01-01 PROCEDURE — 96411 CHEMO IV PUSH ADDL DRUG: CPT

## 2017-01-01 PROCEDURE — 96361 HYDRATE IV INFUSION ADD-ON: CPT

## 2017-01-01 PROCEDURE — A9552 F18 FDG: HCPCS | Performed by: INTERNAL MEDICINE

## 2017-01-01 PROCEDURE — 25000132 ZZH RX MED GY IP 250 OP 250 PS 637: Mod: ZF | Performed by: RADIOLOGY

## 2017-01-01 PROCEDURE — 77336 RADIATION PHYSICS CONSULT: CPT | Performed by: RADIOLOGY

## 2017-01-01 PROCEDURE — 77307 TELETHX ISODOSE PLAN CPLX: CPT | Performed by: RADIOLOGY

## 2017-01-01 PROCEDURE — 40000268 ZZH STATISTIC NO CHARGES: Mod: ZF

## 2017-01-01 PROCEDURE — 77371 SRS MULTISOURCE: CPT | Performed by: RADIOLOGY

## 2017-01-01 PROCEDURE — 25500064 ZZH RX 255 OP 636: Performed by: INTERNAL MEDICINE

## 2017-01-01 PROCEDURE — 99215 OFFICE O/P EST HI 40 MIN: CPT | Mod: 25 | Performed by: RADIOLOGY

## 2017-01-01 PROCEDURE — 70553 MRI BRAIN STEM W/O & W/DYE: CPT

## 2017-01-01 PROCEDURE — 82565 ASSAY OF CREATININE: CPT | Performed by: INTERNAL MEDICINE

## 2017-01-01 PROCEDURE — A9585 GADOBUTROL INJECTION: HCPCS | Performed by: RADIOLOGY

## 2017-01-01 PROCEDURE — 84443 ASSAY THYROID STIM HORMONE: CPT | Performed by: INTERNAL MEDICINE

## 2017-01-01 PROCEDURE — 84100 ASSAY OF PHOSPHORUS: CPT | Performed by: NURSE PRACTITIONER

## 2017-01-01 PROCEDURE — 80053 COMPREHEN METABOLIC PANEL: CPT | Performed by: NURSE PRACTITIONER

## 2017-01-01 PROCEDURE — 70552 MRI BRAIN STEM W/DYE: CPT

## 2017-01-01 PROCEDURE — 99001 SPECIMEN HANDLING PT-LAB: CPT | Performed by: INTERNAL MEDICINE

## 2017-01-01 PROCEDURE — 99215 OFFICE O/P EST HI 40 MIN: CPT | Performed by: RADIOLOGY

## 2017-01-01 PROCEDURE — 77295 3-D RADIOTHERAPY PLAN: CPT | Performed by: RADIOLOGY

## 2017-01-01 PROCEDURE — P9016 RBC LEUKOCYTES REDUCED: HCPCS | Performed by: INTERNAL MEDICINE

## 2017-01-01 PROCEDURE — 99213 OFFICE O/P EST LOW 20 MIN: CPT | Mod: 27

## 2017-01-01 RX ORDER — ACETAMINOPHEN 325 MG/1
650 TABLET ORAL EVERY 4 HOURS
Status: DISCONTINUED | OUTPATIENT
Start: 2017-01-01 | End: 2017-01-01 | Stop reason: HOSPADM

## 2017-01-01 RX ORDER — SODIUM CHLORIDE 9 MG/ML
1000 INJECTION, SOLUTION INTRAVENOUS CONTINUOUS PRN
Status: CANCELLED
Start: 2017-01-01

## 2017-01-01 RX ORDER — EPINEPHRINE 0.3 MG/.3ML
0.3 INJECTION SUBCUTANEOUS EVERY 5 MIN PRN
Status: CANCELLED | OUTPATIENT
Start: 2017-01-01

## 2017-01-01 RX ORDER — MEPERIDINE HYDROCHLORIDE 25 MG/ML
25 INJECTION INTRAMUSCULAR; INTRAVENOUS; SUBCUTANEOUS EVERY 30 MIN PRN
Status: CANCELLED | OUTPATIENT
Start: 2017-01-01

## 2017-01-01 RX ORDER — PROCHLORPERAZINE MALEATE 10 MG
10 TABLET ORAL EVERY 6 HOURS PRN
Qty: 30 TABLET | Refills: 5 | Status: SHIPPED | OUTPATIENT
Start: 2017-01-01 | End: 2017-01-01

## 2017-01-01 RX ORDER — EPINEPHRINE 0.3 MG/.3ML
INJECTION SUBCUTANEOUS
Status: DISCONTINUED
Start: 2017-01-01 | End: 2017-01-01 | Stop reason: WASHOUT

## 2017-01-01 RX ORDER — IOPAMIDOL 755 MG/ML
10-140 INJECTION, SOLUTION INTRAVASCULAR ONCE
Status: COMPLETED | OUTPATIENT
Start: 2017-01-01 | End: 2017-01-01

## 2017-01-01 RX ORDER — DEXAMETHASONE 4 MG/1
TABLET ORAL
Qty: 30 TABLET | Refills: 0 | Status: SHIPPED
Start: 2017-01-01 | End: 2017-01-01

## 2017-01-01 RX ORDER — FENTANYL 25 UG/1
1 PATCH TRANSDERMAL
Qty: 10 PATCH | Refills: 0 | Status: SHIPPED | OUTPATIENT
Start: 2017-01-01 | End: 2017-01-01

## 2017-01-01 RX ORDER — OXYCODONE HYDROCHLORIDE 5 MG/1
5-15 CAPSULE ORAL EVERY 4 HOURS PRN
Qty: 200 CAPSULE | Refills: 0 | Status: SHIPPED | OUTPATIENT
Start: 2017-01-01

## 2017-01-01 RX ORDER — LORAZEPAM 2 MG/ML
0.5 INJECTION INTRAMUSCULAR EVERY 4 HOURS PRN
Status: CANCELLED
Start: 2017-01-01

## 2017-01-01 RX ORDER — ALBUTEROL SULFATE 0.83 MG/ML
2.5 SOLUTION RESPIRATORY (INHALATION)
Status: CANCELLED | OUTPATIENT
Start: 2017-01-01

## 2017-01-01 RX ORDER — ZOLEDRONIC ACID 0.04 MG/ML
4 INJECTION, SOLUTION INTRAVENOUS ONCE
Status: COMPLETED | OUTPATIENT
Start: 2017-01-01 | End: 2017-01-01

## 2017-01-01 RX ORDER — ZOLEDRONIC ACID 0.04 MG/ML
4 INJECTION, SOLUTION INTRAVENOUS ONCE
Status: CANCELLED | OUTPATIENT
Start: 2017-01-01 | End: 2017-01-01

## 2017-01-01 RX ORDER — ACETAMINOPHEN 325 MG/1
650 TABLET ORAL EVERY 4 HOURS PRN
Status: DISCONTINUED | OUTPATIENT
Start: 2017-01-01 | End: 2017-01-01 | Stop reason: HOSPADM

## 2017-01-01 RX ORDER — ONDANSETRON 4 MG/1
8 TABLET, ORALLY DISINTEGRATING ORAL EVERY 6 HOURS PRN
Status: DISCONTINUED | OUTPATIENT
Start: 2017-01-01 | End: 2017-01-01 | Stop reason: HOSPADM

## 2017-01-01 RX ORDER — LORAZEPAM 0.5 MG/1
.5-1 TABLET ORAL
Status: DISCONTINUED | OUTPATIENT
Start: 2017-01-01 | End: 2017-01-01 | Stop reason: HOSPADM

## 2017-01-01 RX ORDER — FENTANYL 75 UG/1
1 PATCH TRANSDERMAL
Qty: 10 PATCH | Refills: 0 | Status: CANCELLED | OUTPATIENT
Start: 2017-01-01

## 2017-01-01 RX ORDER — DIPHENHYDRAMINE HCL 25 MG
50 CAPSULE ORAL
Status: DISCONTINUED | OUTPATIENT
Start: 2017-01-01 | End: 2017-01-01 | Stop reason: HOSPADM

## 2017-01-01 RX ORDER — FENTANYL 25 UG/1
1 PATCH TRANSDERMAL
Qty: 10 PATCH | Refills: 0 | Status: SHIPPED | OUTPATIENT
Start: 2017-01-01

## 2017-01-01 RX ORDER — GADOBUTROL 604.72 MG/ML
10 INJECTION INTRAVENOUS ONCE
Status: COMPLETED | OUTPATIENT
Start: 2017-01-01 | End: 2017-01-01

## 2017-01-01 RX ORDER — LORAZEPAM 0.5 MG/1
0.5 TABLET ORAL EVERY 4 HOURS PRN
Qty: 30 TABLET | Refills: 5 | Status: SHIPPED | OUTPATIENT
Start: 2017-01-01 | End: 2017-01-01

## 2017-01-01 RX ORDER — METHYLPREDNISOLONE SODIUM SUCCINATE 125 MG/2ML
125 INJECTION, POWDER, LYOPHILIZED, FOR SOLUTION INTRAMUSCULAR; INTRAVENOUS
Status: CANCELLED
Start: 2017-01-01

## 2017-01-01 RX ORDER — ALBUTEROL SULFATE 90 UG/1
1-2 AEROSOL, METERED RESPIRATORY (INHALATION) DAILY PRN
COMMUNITY
Start: 2015-02-10

## 2017-01-01 RX ORDER — DIPHENHYDRAMINE HYDROCHLORIDE 50 MG/ML
50 INJECTION INTRAMUSCULAR; INTRAVENOUS
Status: CANCELLED
Start: 2017-01-01

## 2017-01-01 RX ORDER — ONDANSETRON 8 MG/1
8 TABLET, FILM COATED ORAL EVERY 8 HOURS PRN
Qty: 60 TABLET | Refills: 3 | Status: SHIPPED | OUTPATIENT
Start: 2017-01-01 | End: 2017-01-01

## 2017-01-01 RX ORDER — DEXAMETHASONE 4 MG/1
8 TABLET ORAL 2 TIMES DAILY WITH MEALS
Qty: 12 TABLET | Refills: 7 | Status: SHIPPED | OUTPATIENT
Start: 2017-01-01 | End: 2017-01-01

## 2017-01-01 RX ORDER — FENTANYL 100 UG/1
1 PATCH TRANSDERMAL
Qty: 10 PATCH | Refills: 0 | Status: SHIPPED | OUTPATIENT
Start: 2017-01-01 | End: 2017-05-24

## 2017-01-01 RX ORDER — CYCLOBENZAPRINE HCL 10 MG
10 TABLET ORAL 3 TIMES DAILY PRN
Qty: 90 TABLET | Refills: 0 | Status: SHIPPED | OUTPATIENT
Start: 2017-01-01 | End: 2017-01-01

## 2017-01-01 RX ORDER — ALBUTEROL SULFATE 90 UG/1
1-2 AEROSOL, METERED RESPIRATORY (INHALATION)
Status: CANCELLED
Start: 2017-01-01

## 2017-01-01 RX ORDER — HYDROCODONE BITARTRATE AND ACETAMINOPHEN 5; 325 MG/1; MG/1
1-2 TABLET ORAL EVERY 6 HOURS PRN
Status: DISCONTINUED | OUTPATIENT
Start: 2017-01-01 | End: 2017-01-01 | Stop reason: HOSPADM

## 2017-01-01 RX ORDER — OXYCODONE HYDROCHLORIDE 5 MG/1
5-15 CAPSULE ORAL EVERY 4 HOURS PRN
Qty: 200 CAPSULE | Refills: 0 | Status: SHIPPED | OUTPATIENT
Start: 2017-01-01 | End: 2017-01-01

## 2017-01-01 RX ORDER — OXYCODONE HYDROCHLORIDE 5 MG/1
5 CAPSULE ORAL EVERY 4 HOURS PRN
Qty: 100 CAPSULE | Refills: 0 | Status: SHIPPED | OUTPATIENT
Start: 2017-01-01 | End: 2017-01-01

## 2017-01-01 RX ORDER — PROCHLORPERAZINE MALEATE 10 MG
10 TABLET ORAL EVERY 6 HOURS PRN
Qty: 30 TABLET | Refills: 5 | Status: SHIPPED | OUTPATIENT
Start: 2017-01-01

## 2017-01-01 RX ORDER — LORAZEPAM 0.5 MG/1
.5-2 TABLET ORAL
Status: COMPLETED | OUTPATIENT
Start: 2017-01-01 | End: 2017-01-01

## 2017-01-01 RX ORDER — DEXAMETHASONE 4 MG/1
4 TABLET ORAL 2 TIMES DAILY WITH MEALS
Qty: 60 TABLET | Refills: 0 | Status: SHIPPED | OUTPATIENT
Start: 2017-01-01

## 2017-01-01 RX ORDER — LIDOCAINE 40 MG/G
1 CREAM TOPICAL SEE ADMIN INSTRUCTIONS
Status: COMPLETED | OUTPATIENT
Start: 2017-01-01 | End: 2017-01-01

## 2017-01-01 RX ORDER — PROCHLORPERAZINE MALEATE 10 MG
10 TABLET ORAL EVERY 6 HOURS PRN
Qty: 30 TABLET | Refills: 2 | Status: SHIPPED | OUTPATIENT
Start: 2017-01-01 | End: 2017-01-01

## 2017-01-01 RX ORDER — LIDOCAINE 50 MG/G
PATCH TOPICAL
Qty: 30 PATCH | Refills: 0 | Status: SHIPPED | OUTPATIENT
Start: 2017-01-01

## 2017-01-01 RX ORDER — PROCHLORPERAZINE MALEATE 10 MG
10 TABLET ORAL EVERY 6 HOURS PRN
Qty: 60 TABLET | Refills: 3 | Status: SHIPPED | OUTPATIENT
Start: 2017-01-01 | End: 2017-01-01

## 2017-01-01 RX ORDER — DEXAMETHASONE 4 MG/1
TABLET ORAL
Qty: 30 TABLET | Refills: 0 | Status: SHIPPED | OUTPATIENT
Start: 2017-01-01 | End: 2017-01-01

## 2017-01-01 RX ORDER — CYCLOBENZAPRINE HCL 10 MG
10 TABLET ORAL 3 TIMES DAILY PRN
Qty: 90 TABLET | Refills: 0 | Status: SHIPPED | OUTPATIENT
Start: 2017-01-01

## 2017-01-01 RX ORDER — FENTANYL 50 UG/1
1 PATCH TRANSDERMAL
Qty: 10 PATCH | Refills: 0 | Status: SHIPPED | OUTPATIENT
Start: 2017-01-01 | End: 2017-01-01

## 2017-01-01 RX ORDER — OXYCODONE HYDROCHLORIDE 5 MG/1
5-10 CAPSULE ORAL EVERY 4 HOURS PRN
Qty: 100 CAPSULE | Refills: 0 | Status: SHIPPED | OUTPATIENT
Start: 2017-01-01 | End: 2017-01-01

## 2017-01-01 RX ORDER — CYCLOBENZAPRINE HCL 10 MG
10 TABLET ORAL
COMMUNITY
Start: 2017-01-01 | End: 2017-01-01

## 2017-01-01 RX ORDER — PANTOPRAZOLE SODIUM 40 MG/1
40 TABLET, DELAYED RELEASE ORAL DAILY
Qty: 30 TABLET | Refills: 3 | Status: SHIPPED | OUTPATIENT
Start: 2017-01-01

## 2017-01-01 RX ORDER — FENTANYL 50 UG/1
1 PATCH TRANSDERMAL
Qty: 10 PATCH | Refills: 0 | Status: SHIPPED | OUTPATIENT
Start: 2017-01-01

## 2017-01-01 RX ORDER — OXYCODONE HCL 10 MG/1
10 TABLET, FILM COATED, EXTENDED RELEASE ORAL AT BEDTIME
Qty: 30 TABLET | Refills: 0 | Status: SHIPPED | OUTPATIENT
Start: 2017-01-01 | End: 2017-01-01

## 2017-01-01 RX ORDER — OXYCODONE HYDROCHLORIDE 5 MG/1
5-10 CAPSULE ORAL EVERY 4 HOURS PRN
Qty: 150 CAPSULE | Refills: 0 | Status: SHIPPED | OUTPATIENT
Start: 2017-01-01 | End: 2017-01-01

## 2017-01-01 RX ORDER — ONDANSETRON 2 MG/ML
4 INJECTION INTRAMUSCULAR; INTRAVENOUS
Status: DISCONTINUED | OUTPATIENT
Start: 2017-01-01 | End: 2017-01-01 | Stop reason: HOSPADM

## 2017-01-01 RX ORDER — GABAPENTIN 300 MG/1
300 CAPSULE ORAL 3 TIMES DAILY
Qty: 90 CAPSULE | Refills: 1 | Status: SHIPPED | OUTPATIENT
Start: 2017-01-01

## 2017-01-01 RX ORDER — LORAZEPAM 0.5 MG/1
0.5 TABLET ORAL EVERY 4 HOURS PRN
Qty: 30 TABLET | Refills: 2 | Status: SHIPPED | OUTPATIENT
Start: 2017-01-01 | End: 2017-05-04

## 2017-01-01 RX ORDER — ONDANSETRON 8 MG/1
8 TABLET, FILM COATED ORAL EVERY 8 HOURS PRN
Qty: 90 TABLET | Refills: 3 | Status: SHIPPED | OUTPATIENT
Start: 2017-01-01

## 2017-01-01 RX ADMIN — DIPHENHYDRAMINE HYDROCHLORIDE 50 MG: 25 CAPSULE ORAL at 11:48

## 2017-01-01 RX ADMIN — GEMCITABINE 2400 MG: 38 INJECTION, SOLUTION INTRAVENOUS at 16:09

## 2017-01-01 RX ADMIN — SODIUM CHLORIDE 250 ML: 9 INJECTION, SOLUTION INTRAVENOUS at 11:18

## 2017-01-01 RX ADMIN — DIPHENHYDRAMINE HYDROCHLORIDE 50 MG: 25 CAPSULE ORAL at 15:10

## 2017-01-01 RX ADMIN — IOPAMIDOL 134 ML: 755 INJECTION, SOLUTION INTRAVENOUS at 08:23

## 2017-01-01 RX ADMIN — DEXAMETHASONE SODIUM PHOSPHATE 12 MG: 10 INJECTION, SOLUTION INTRAMUSCULAR; INTRAVENOUS at 11:18

## 2017-01-01 RX ADMIN — DEXAMETHASONE SODIUM PHOSPHATE 12 MG: 10 INJECTION, SOLUTION INTRAMUSCULAR; INTRAVENOUS at 11:05

## 2017-01-01 RX ADMIN — SODIUM CHLORIDE 250 ML: 9 INJECTION, SOLUTION INTRAVENOUS at 13:49

## 2017-01-01 RX ADMIN — DEXAMETHASONE SODIUM PHOSPHATE 12 MG: 10 INJECTION, SOLUTION INTRAMUSCULAR; INTRAVENOUS at 15:16

## 2017-01-01 RX ADMIN — Medication 1700 MCG: at 13:25

## 2017-01-01 RX ADMIN — VINORELBINE TARTRATE 58 MG: 10 INJECTION INTRAVENOUS at 15:45

## 2017-01-01 RX ADMIN — ACETAMINOPHEN 650 MG: 325 TABLET ORAL at 13:50

## 2017-01-01 RX ADMIN — SODIUM CHLORIDE 250 ML: 9 INJECTION, SOLUTION INTRAVENOUS at 11:05

## 2017-01-01 RX ADMIN — ZOLEDRONIC ACID 4 MG: 0.04 INJECTION, SOLUTION INTRAVENOUS at 11:22

## 2017-01-01 RX ADMIN — LORAZEPAM 2 MG: 0.5 TABLET ORAL at 05:26

## 2017-01-01 RX ADMIN — SODIUM CHLORIDE 250 ML: 9 INJECTION, SOLUTION INTRAVENOUS at 12:25

## 2017-01-01 RX ADMIN — DIPHENHYDRAMINE HYDROCHLORIDE 50 MG: 25 CAPSULE ORAL at 13:32

## 2017-01-01 RX ADMIN — DOCETAXEL 180 MG: 80 INJECTION, SOLUTION, CONCENTRATE INTRAVENOUS at 11:49

## 2017-01-01 RX ADMIN — ZOLEDRONIC ACID 4 MG: 0.04 INJECTION, SOLUTION INTRAVENOUS at 15:11

## 2017-01-01 RX ADMIN — SODIUM CHLORIDE 250 ML: 9 INJECTION, SOLUTION INTRAVENOUS at 15:11

## 2017-01-01 RX ADMIN — GEMCITABINE 2400 MG: 38 INJECTION, SOLUTION INTRAVENOUS at 11:19

## 2017-01-01 RX ADMIN — VINORELBINE TARTRATE 58 MG: 10 INJECTION INTRAVENOUS at 10:05

## 2017-01-01 RX ADMIN — VINORELBINE TARTRATE 58 MG: 10 INJECTION INTRAVENOUS at 11:11

## 2017-01-01 RX ADMIN — SODIUM CHLORIDE 250 ML: 9 INJECTION, SOLUTION INTRAVENOUS at 09:15

## 2017-01-01 RX ADMIN — DIPHENHYDRAMINE HYDROCHLORIDE 50 MG: 25 CAPSULE ORAL at 12:27

## 2017-01-01 RX ADMIN — GEMCITABINE 2400 MG: 38 INJECTION, SOLUTION INTRAVENOUS at 10:13

## 2017-01-01 RX ADMIN — DIPHENHYDRAMINE HYDROCHLORIDE 50 MG: 25 CAPSULE ORAL at 15:07

## 2017-01-01 RX ADMIN — Medication 1730 MCG: at 14:44

## 2017-01-01 RX ADMIN — DEXAMETHASONE SODIUM PHOSPHATE 12 MG: 10 INJECTION, SOLUTION INTRAMUSCULAR; INTRAVENOUS at 09:47

## 2017-01-01 RX ADMIN — DEXAMETHASONE SODIUM PHOSPHATE 12 MG: 10 INJECTION, SOLUTION INTRAMUSCULAR; INTRAVENOUS at 10:23

## 2017-01-01 RX ADMIN — GADOBUTROL 10 ML: 604.72 INJECTION INTRAVENOUS at 19:37

## 2017-01-01 RX ADMIN — LIDOCAINE HYDROCHLORIDE,EPINEPHRINE BITARTRATE 20 ML: 20; .01 INJECTION, SOLUTION INFILTRATION; PERINEURAL at 05:26

## 2017-01-01 RX ADMIN — SODIUM CHLORIDE 250 ML: 9 INJECTION, SOLUTION INTRAVENOUS at 10:23

## 2017-01-01 RX ADMIN — ACETAMINOPHEN 650 MG: 325 TABLET ORAL at 15:07

## 2017-01-01 RX ADMIN — GADOBUTROL 10 ML: 604.72 INJECTION INTRAVENOUS at 16:39

## 2017-01-01 RX ADMIN — SODIUM CHLORIDE 240 MG: 9 INJECTION, SOLUTION INTRAVENOUS at 12:21

## 2017-01-01 RX ADMIN — LIDOCAINE 1 G: 40 CREAM TOPICAL at 05:26

## 2017-01-01 RX ADMIN — Medication 1730 MCG: at 13:02

## 2017-01-01 RX ADMIN — ZOLEDRONIC ACID 4 MG: 0.04 INJECTION, SOLUTION INTRAVENOUS at 13:31

## 2017-01-01 RX ADMIN — FLUDEOXYGLUCOSE F-18 15.61 MCI.: 500 INJECTION, SOLUTION INTRAVENOUS at 07:14

## 2017-01-01 RX ADMIN — SODIUM CHLORIDE 240 MG: 9 INJECTION, SOLUTION INTRAVENOUS at 15:31

## 2017-01-01 RX ADMIN — SODIUM CHLORIDE 250 ML: 9 INJECTION, SOLUTION INTRAVENOUS at 15:16

## 2017-01-01 RX ADMIN — DOCETAXEL 180 MG: 80 INJECTION, SOLUTION, CONCENTRATE INTRAVENOUS at 11:45

## 2017-01-01 RX ADMIN — GADOBUTROL 10 ML: 604.72 INJECTION INTRAVENOUS at 10:51

## 2017-01-01 RX ADMIN — SODIUM CHLORIDE 240 MG: 9 INJECTION, SOLUTION INTRAVENOUS at 13:46

## 2017-01-01 RX ADMIN — Medication 1740 MCG: at 15:49

## 2017-01-01 RX ADMIN — GADOBUTROL 10 ML: 604.72 INJECTION INTRAVENOUS at 07:13

## 2017-01-01 ASSESSMENT — ENCOUNTER SYMPTOMS
DEPRESSION: 0
BACK PAIN: 1
NERVOUS/ANXIOUS: 0
WEIGHT LOSS: 1
CHILLS: 0
NAUSEA: 0
SORE THROAT: 0
BACK PAIN: 1
HEADACHES: 0
NAUSEA: 0
FEVER: 0
DIARRHEA: 0
NAUSEA: 1
DIZZINESS: 0
FREQUENCY: 0
EYE PAIN: 0
SEIZURES: 0
NECK PAIN: 1
SHORTNESS OF BREATH: 0
BLOOD IN STOOL: 0
VOMITING: 1
WEIGHT LOSS: 0
FEVER: 0
DOUBLE VISION: 0
NERVOUS/ANXIOUS: 0
SEIZURES: 0
WEIGHT LOSS: 1
NECK PAIN: 0
DIZZINESS: 0
WEAKNESS: 1
HEARTBURN: 1
PHOTOPHOBIA: 0
PSYCHIATRIC NEGATIVE: 1
CONSTIPATION: 1
COUGH: 1
DIARRHEA: 0
BLOOD IN STOOL: 0
DIAPHORESIS: 0
SHORTNESS OF BREATH: 0
FREQUENCY: 0
WEAKNESS: 1
BLURRED VISION: 0
VOMITING: 0
DEPRESSION: 0
SORE THROAT: 0
COUGH: 0
BLURRED VISION: 0
HEADACHES: 0
WHEEZING: 0
TINGLING: 0
CHILLS: 0
DYSURIA: 0
INSOMNIA: 0
HEMATURIA: 0
FEVER: 0
VOMITING: 0
BRUISES/BLEEDS EASILY: 0
ABDOMINAL PAIN: 0
HEADACHES: 0
TINGLING: 1
CONSTIPATION: 1
BLURRED VISION: 0
DOUBLE VISION: 0
DIZZINESS: 0
SEIZURES: 0
SHORTNESS OF BREATH: 0
CONSTIPATION: 1
DYSURIA: 1
COUGH: 0

## 2017-01-01 ASSESSMENT — PAIN SCALES - GENERAL
PAINLEVEL: SEVERE PAIN (7)
PAINLEVEL: SEVERE PAIN (7)
PAINLEVEL: SEVERE PAIN (6)
PAINLEVEL: NO PAIN (0)
PAINLEVEL: EXTREME PAIN (8)
PAINLEVEL: MODERATE PAIN (5)

## 2017-01-03 PROBLEM — C79.31 BRAIN METASTASIS: Status: ACTIVE | Noted: 2017-01-01

## 2017-01-03 NOTE — PROGRESS NOTES
Infusion Nursing Note:  Naren Kimble presents today for cylce 2, day 1 nivolumab, ALT-803 (IDS #4909), zometa  Patient seen by provider today: Yes: Yulia Lepe NP    Note: N/A.    Intravenous Access:  Peripheral IV placed.    Treatment Conditions:  HGB      9.4   1/3/2017  WBC      8.6   1/3/2017   ANEU      5.7   1/3/2017  PLT      386   1/3/2017     NA      135   1/3/2017                POTASSIUM      4.2   1/3/2017        MAG      2.1   1/3/2017         CR     0.72   1/3/2017                ROSENDO      9.3   1/3/2017             BILITOTAL      0.3   1/3/2017        ALBUMIN      3.4   1/3/2017                 ALT       19   1/3/2017        AST       23   1/3/2017  Results reviewed, labs MET treatment parameters, ok to proceed with treatment.      Post Infusion Assessment:  Patient tolerated infusion without incident.  Patient tolerated injections without incident. ALT-803 given as 2 SQ injections 1 inch apart into left abdomen  Patient observed for 2 hours post injections and frequent vital signs done by Shana Motley CMA  Blood return noted pre and post infusion.  Site patent and intact, free from redness, edema or discomfort.  No evidence of extravasations.  Access discontinued per protocol.    Patient took his own tylenol prior to study injection.  He was given a dose of oral benadryl at discharge and instructed to take the oral benadryl and another dose of tylenol at 1945. He will be over at MRI at that time and verbalized understanding.     Discharge Plan:   Patient declined prescription refills.  Discharge instructions reviewed with: Patient.  Patient and/or family verbalized understanding of discharge instructions and all questions answered.  Copy of AVS reviewed with patient and/or family.  Patient will return 1/10 for next infusion appointment.  Patient discharged in stable condition accompanied by: wife.  Departure Mode: Ambulatory.  Face to Face time: 0.    Zometa stop time: 1530    Batsheva Dumont  RN

## 2017-01-03 NOTE — Clinical Note
1/3/2017       RE: Naren Kimble  [address]  [facility] [address]     Dear Colleague,    Thank you for referring your patient, Naren Kimble, to the [facility]. Please see a copy of my visit note below.    Naren Kimble is a 41 year old man with metastatic NSCLC, seen for evaluation prior to c2, d1 ALT-803/Nivolumab.    Oncology HPI:    Stage IV non-small cell lung cancer.  Initially presented in 07/2016 with a brain mass, which was resected and was consistent with metastatic adenocarcinoma of the lung. He was treated with stereotactic brain radiation to the tumor bed.  Negative EGFR and ALK rearrangement studies.   ROS1 studies are not known.  CT scan in 07/2016 did not have any definitive mass in the lung.  PET/CT 08/2015 showed 1 cm mass in the left lower lobe.  Bronchoscopy was negative with the [facility].  He proceeded with cisplatin and Alimta after stereotactic radiation to resection bed and started chemotherapy 09/19/2016.  Imaging after 2 cycles of chemotherapy were consistent with metastatic disease in the bone.  He was started on Zometa. He progressed on cisplatin/alimta and was started on a clinical trial with ALT-803 plus nivolumab. He had increased edema, fevers and skin rash with the first 2 treatments. Because of this he only received Nivolumab on day 15. He proceeded with subsequent treatment without further difficulty. 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    Interval history:  Valerio has had improvement in his leg edema and rash. The last injection (day 29) occurred without recurrent edema or rash. Appetite and energy have been good. He has been having continued low back pain and bilateral groin/hip pain. He had improvement after taking ibuprofen last week. Is otherwise taking oxycodone and tylenol to manage the pain. No fevers/chills. No cough/shortness of breath/chest pain. No  headaches or vision changes. Bowels are regular with the use of senna. Urination normal. No focal weakness. No bleeding or bruising.    Current Outpatient Prescriptions   Medication Sig Dispense Refill     lidocaine (LIDODERM) 5 % Patch Apply up to 3 patches to painful area at once for up to 12 h within a 24 h period.  Remove after 12 hours. 30 patch 0     oxyCODONE (OXY-IR) 5 MG capsule Take 1 capsule (5 mg) by mouth every 4 hours as needed for moderate to severe pain 60 capsule 0     Acetaminophen (TYLENOL PO) Take 500 mg by mouth daily as needed        levETIRAcetam (KEPPRA) 100 MG/ML solution Take 10 mg/kg by mouth 2 times daily       CYCLOBENZAPRINE HCL PO Take 10 mg by mouth 3 times daily as needed        FOLIC ACID PO Take 800 mcg by mouth daily       fentaNYL (DURAGESIC) 25 mcg/hr patch 72 hr Place 1 patch onto the skin every 72 hours        diphenhydrAMINE (BENADRYL) 25 MG tablet Take 1-2 tablets (25-50 mg) by mouth every 6 hours as needed for itching or allergies 50 tablet 3     loratadine (CLARITIN) 10 MG tablet Take 1 tablet (10 mg) by mouth daily 30 tablet 3     famotidine (PEPCID) 10 MG tablet Take 2 tablets (20 mg) by mouth 2 times daily 20 tablet 1     ondansetron (ZOFRAN) 8 MG tablet Take 1 tablet (8 mg) by mouth every 8 hours as needed 9 tablet 0     PROCHLORPERAZINE MALEATE PO Take 10 mg by mouth every 6 hours as needed for nausea or vomiting       Exam: alert, appears well. Blood pressure 126/88, pulse 89, temperature 98.6  F (37  C), temperature source Oral, resp. rate 20, weight 116.302 kg (256 lb 6.4 oz), SpO2 98 %.  Oropharnyx is moist, no focal lesion. No icterus. Neck supple and without adenopathy. Lungs:CTA. Heart:RRR, no murmur or rub. Abdomen: soft, nontender, BS active. No masses. Extremities: no edema or rash. Speech is clear. CN wnl. Gait/station wnl.    Labs:  Results for OBDULIA CHIANG (MRN [id number]) as of 1/3/2017 14:39   Ref. Range 1/3/2017 14:10   Sodium Latest Ref Range:  133-144 mmol/L 135   Potassium Latest Ref Range: 3.4-5.3 mmol/L 4.2   Chloride Latest Ref Range:  mmol/L 102   Carbon Dioxide Latest Ref Range: 20-32 mmol/L 25   Urea Nitrogen Latest Ref Range: 7-30 mg/dL 16   Creatinine Latest Ref Range: 0.66-1.25 mg/dL 0.72   GFR Estimate Latest Ref Range: >60 mL/min/1.7m2 >90...   GFR Estimate If Black Latest Ref Range: >60 mL/min/1.7m2 >90...   Calcium Latest Ref Range: 8.5-10.1 mg/dL 9.3   Anion Gap Latest Ref Range: 3-14 mmol/L 8   Magnesium Latest Ref Range: 1.6-2.3 mg/dL 2.1   Phosphorus Latest Ref Range: 2.5-4.5 mg/dL 3.4   Albumin Latest Ref Range: 3.4-5.0 g/dL 3.4   Protein Total Latest Ref Range: 6.8-8.8 g/dL 9.4 (H)   Bilirubin Total Latest Ref Range: 0.2-1.3 mg/dL 0.3   Alkaline Phosphatase Latest Ref Range:  U/L 61   ALT Latest Ref Range: 0-70 U/L 19   AST Latest Ref Range: 0-45 U/L 23   Lactate Dehydrogenase Latest Ref Range:  U/L 437 (H)   Glucose Latest Ref Range: 70-99 mg/dL 88   WBC Latest Ref Range: 4.0-11.0 10e9/L 8.6   Hemoglobin Latest Ref Range: 13.3-17.7 g/dL 9.4 (L)   Hematocrit Latest Ref Range: 40.0-53.0 % 30.0 (L)   Platelet Count Latest Ref Range: 150-450 10e9/L 386   RBC Count Latest Ref Range: 4.4-5.9 10e12/L 3.37 (L)   MCV Latest Ref Range:  fl 89   MCH Latest Ref Range: 26.5-33.0 pg 27.9   MCHC Latest Ref Range: 31.5-36.5 g/dL 31.3 (L)   RDW Latest Ref Range: 10.0-15.0 % 14.2   Diff Method Unknown Automated Method   % Neutrophils Latest Units: % 66.8   % Lymphocytes Latest Units: % 23.0   % Monocytes Latest Units: % 7.3   % Eosinophils Latest Units: % 1.9   % Basophils Latest Units: % 0.7   % Immature Granulocytes Latest Units: % 0.3   Nucleated RBCs Latest Ref Range: 0 /100 0   Absolute Neutrophil Latest Ref Range: 1.6-8.3 10e9/L 5.7   Absolute Lymphocytes Latest Ref Range: 0.8-5.3 10e9/L 2.0   Absolute Monocytes Latest Ref Range: 0.0-1.3 10e9/L 0.6   Absolute Eosinophils Latest Ref Range: 0.0-0.7 10e9/L 0.2   Absolute  Basophils Latest Ref Range: 0.0-0.2 10e9/L 0.1   Abs Immature Granulocytes Latest Ref Range: 0-0.4 10e9/L 0.0   Absolute Nucleated RBC Unknown 0.0     Impression/plan:  1. 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 Has had improved tolerance to the study drug with less edema/rash.  -Will proceed with cycle 2 today, has weekly f/u while on the study.  -Repeat PET/CT imaging in 6 weeks per protocol    2. Drug rash: none at present. Can use benadryl, claritin, tylenol premedication prior to ALT-803    3. Back pain--initially thought to be related to bone metastases, but now thought it may be related to treatment related myalgias as there wasn't specific mass imaged on PET. Improved with ibuprofen  -Has a f/u MRI spine today to assess this.  -OK per protocol to take up to 2400 mg ibuprofen/day. No steroids. OK to take 1000 mg tylenol every 6 hours. OK to continue oxycodone and fentanyl patch 25 mcg.    4. Bone mets: on Zometa, due today. He hasn't been taking Calcium +D. Asked him to start doing that now.    5. Brain metastasis, s/p resection, stereotactic radiation on 9/16/16: due for brain re- imaging. Will try to add to today's scheduled spine MRI, if not, OK to wait until next week            Research RN met with patient and wife for C2W1D1.  No evidence of edema, fevers or skin rash have occurred since last visit.  Chronic back and hip pain continue to be a problem causing patient to lay in bed all week, current rating is a 7 to 8 out of ten.  Pain was relieved with 2 days of ibuprofen per last week's instructions and patient would like to continue with ibuprofen going forward.  Per protocol, ibuprofen is allowed, but not to exceed 2400 mg in 24 hours.  MRI is scheduled to day to evaluate back pain.  Study drug will be administered today.    Again, thank you for allowing me to participate in the care of your patient.       Sincerely,    DANIA Greenfield CNP

## 2017-01-03 NOTE — PATIENT INSTRUCTIONS
Contact Numbers    [facility] [place] [phone]  [facility] [place]:  [phone]    Call triage with chills and/or temperature greater than or equal to 100.5, uncontrolled nausea/vomiting, diarrhea, constipation, dizziness, shortness of breath, chest pain, bleeding, unexplained bruising, or any new/concerning symptoms, questions/concerns.     If you are having any concerning symptoms or wish to speak to a provider before your next infusion visit, please call your care coordinator or triage to notify them so we can adequately serve you.       After Hours: [phone]    If after hours, weekends, or holidays, call main hospital  and ask for Oncology doctor on call.           January 2017 Sunday Monday Tuesday Wednesday Thursday Friday Saturday   1     2     3     [facility] D[facility]    1:30 PM   (15 min.)    [facility]     [facility] RETURN    2:00 PM   (50 min.)   Yulia Lepe APRN CNP   [facility] ONC INFUSION 120    3:00 PM   (120 min.)    ONCOLOGY INFUSION   [facility]     MR BRAIN W    6:00 PM   (60 min.)   UR2   [facility], [facility]     MR THORACIC SPINE WWO    7:00 PM   (60 min.)   UR2   [facility], [facility]     MR LUMBAR SPINE WWO    8:00 PM   (60 min.)   UR2   [facility], MRI 4     5     6     [facility] DRAW    9:15 AM   (15 min.)   [facility]   [facility] Draw 7       8     9     10     [facility] DRAW   10:45 AM   (15 min.)   [facility] D[facility]   [facility] Draw     [facility] RETURN   11:20 AM   (50 min.)   [facility]    2:00 PM   (120 min.)    ONCOLOGY INFUSION   [facility] 11     12     13     14       15     16     17     [facility] DRAW    9:45 AM   (15 min.)   [facility] D[facility]   [facility] Draw     [facility] RETURN   10:30 AM   (50 min.)   Yulia Lepe APRN CNP   M  [facility]     [facility] ONC INFUSION 120    1:30 PM   (120 min.)   [facility] ONCOLOGY INFUSION   [facility] 18     19     20     21       22     23     24     [facility] DRAW    1:30 PM   (15 min.)   [facility] DRAW   [facility] Draw     [facility] RETURN    2:00 PM   (50 min.)   Yulia Lepe APRN CNP   [facility]     [facility] ONC INFUSION 120    3:00 PM   (120 min.)    ONCOLOGY INFUSION   [facility] 25     26     27     28       29     30     31     [facility] DRAW    9:45 AM   (15 min.)   [facility] DRAW   [facility] Draw     [facility] RETURN   10:30 AM   (50 min.)   Yulia Lepe APRN CNP   [facility]     [facility] ONC INFUSION 120   12:00 PM   (120 min.)    ONCOLOGY INFUSION   [facility]                                February 2017 Sunday Monday Tuesday Wednesday Thursday Friday Saturday                  1     2     3     4       5     6     7     [facility] DRAW    4:00 PM   (15 min.)    [facility] DRAW   [facility] Draw     [facility] RETURN    4:30 PM   (30 min.)   Khris Randle DO   [facility] 8     9     10     11       12     13     14     15     16     17     18       19     20     21     22     23  Happy Birthday!     24     25       26     27     28                                     Recent Results (from the past 24 hour(s))   Magnesium    Collection Time: 01/03/17  2:10 PM   Result Value Ref Range    Magnesium 2.1 1.6 - 2.3 mg/dL   Phosphorus    Collection Time: 01/03/17  2:10 PM   Result Value Ref Range    Phosphorus 3.4 2.5 - 4.5 mg/dL   Lactate Dehydrogenase    Collection Time: 01/03/17  2:10 PM   Result Value Ref Range    Lactate Dehydrogenase 437 (H) 85 - 227 U/L   CBC with platelets differential    Collection Time: 01/03/17  2:10 PM   Result Value Ref Range    WBC 8.6 4.0 - 11.0 10e9/L    RBC Count 3.37 (L) 4.4 - 5.9 10e12/L     Hemoglobin 9.4 (L) 13.3 - 17.7 g/dL    Hematocrit 30.0 (L) 40.0 - 53.0 %    MCV 89 78 - 100 fl    MCH 27.9 26.5 - 33.0 pg    MCHC 31.3 (L) 31.5 - 36.5 g/dL    RDW 14.2 10.0 - 15.0 %    Platelet Count 386 150 - 450 10e9/L    Diff Method Automated Method     % Neutrophils 66.8 %    % Lymphocytes 23.0 %    % Monocytes 7.3 %    % Eosinophils 1.9 %    % Basophils 0.7 %    % Immature Granulocytes 0.3 %    Nucleated RBCs 0 0 /100    Absolute Neutrophil 5.7 1.6 - 8.3 10e9/L    Absolute Lymphocytes 2.0 0.8 - 5.3 10e9/L    Absolute Monocytes 0.6 0.0 - 1.3 10e9/L    Absolute Eosinophils 0.2 0.0 - 0.7 10e9/L    Absolute Basophils 0.1 0.0 - 0.2 10e9/L    Abs Immature Granulocytes 0.0 0 - 0.4 10e9/L    Absolute Nucleated RBC 0.0    Comprehensive metabolic panel    Collection Time: 01/03/17  2:10 PM   Result Value Ref Range    Sodium 135 133 - 144 mmol/L    Potassium 4.2 3.4 - 5.3 mmol/L    Chloride 102 94 - 109 mmol/L    Carbon Dioxide 25 20 - 32 mmol/L    Anion Gap 8 3 - 14 mmol/L    Glucose 88 70 - 99 mg/dL    Urea Nitrogen 16 7 - 30 mg/dL    Creatinine 0.72 0.66 - 1.25 mg/dL    GFR Estimate >90  Non  GFR Calc   >60 mL/min/1.7m2    GFR Estimate If Black >90   GFR Calc   >60 mL/min/1.7m2    Calcium 9.3 8.5 - 10.1 mg/dL    Bilirubin Total 0.3 0.2 - 1.3 mg/dL    Albumin 3.4 3.4 - 5.0 g/dL    Protein Total 9.4 (H) 6.8 - 8.8 g/dL    Alkaline Phosphatase 61 40 - 150 U/L    ALT 19 0 - 70 U/L    AST 23 0 - 45 U/L

## 2017-01-03 NOTE — NURSING NOTE
"Naren Kimble is a 41 year old male who presents for:  Chief Complaint   Patient presents with     Oncology Clinic Visit     ump return lung ca        Initial Vitals:  There were no vitals taken for this visit. Estimated body mass index is 33.22 kg/(m^2) as calculated from the following:    Height as of 12/27/16: 1.88 m (6' 2\").    Weight as of 12/27/16: 117.4 kg (258 lb 13.1 oz).. There is no height or weight on file to calculate BSA. BP completed using cuff size: regular  Data Unavailable No LMP for male patient. Allergies and medications reviewed.     Medications: Medication refills not needed today.  Pharmacy name entered into EPIC:    [facility] [place], [place] - [address] [place] [address] [address] [place] [address] DRUG STORE [address] [place] [address] AT [address]    Comments: pt states of feeling pain on his back and rates it at a 6    5 minutes for nursing intake (face to face time)   Oneal Avitia CMA          "

## 2017-01-03 NOTE — NURSING NOTE
Performed vitals on pt in clinic for research.   Pre-dose vitals: 3:50 p.m.  Temp-98.4  B/P-112/78  HR-9  RR-16  O2 Sats-99  Post dose vitals:   15 minutes post: 4:07  Temp-97.4  B/P-107/68  HR-82  RR-16  O2 Sats-99   30 minutes post: 4:22  Temp-98.5  B/P-116/80  HR-94  RR-16  O2 sats-99   60 minutes post: 4:46  Temp-98.9  B/P-115/73  HR-84  RR-16  O2 Sats-99   120 minutes post:5:37  Temp-98.3  B/P-116/82  HR-91  RR-16  O2 Sats-99  Shana Motley, CMA

## 2017-01-03 NOTE — NURSING NOTE
Chief Complaint   Patient presents with     Oncology Clinic Visit     ump return lung ca     Blood Draw     Clinical/Research labs collected from venipuncture and sent without difficulty

## 2017-01-03 NOTE — PROGRESS NOTES
Naren Kimble is a 41 year old man with metastatic NSCLC, seen for evaluation prior to c2, d1 ALT-803/Nivolumab.    Oncology HPI:    Stage IV non-small cell lung cancer.  Initially presented in 07/2016 with a brain mass, which was resected and was consistent with metastatic adenocarcinoma of the lung. He was treated with stereotactic brain radiation to the tumor bed.  Negative EGFR and ALK rearrangement studies.   ROS1 studies are not known.  CT scan in 07/2016 did not have any definitive mass in the lung.  PET/CT 08/2015 showed 1 cm mass in the left lower lobe.  Bronchoscopy was negative with the [facility] system.  He proceeded with cisplatin and Alimta after stereotactic radiation to resection bed and started chemotherapy 09/19/2016.  Imaging after 2 cycles of chemotherapy were consistent with metastatic disease in the bone.  He was started on Zometa. He progressed on cisplatin/alimta and was started on a clinical trial with ALT-803 plus nivolumab. He had increased edema, fevers and skin rash with the first 2 treatments. Because of this he only received Nivolumab on day 15. He proceeded with subsequent treatment without further difficulty. 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    Interval history:  Valerio has had improvement in his leg edema and rash. The last injection (day 29) occurred without recurrent edema or rash. Appetite and energy have been good. He has been having continued low back pain and bilateral groin/hip pain. He had improvement after taking ibuprofen last week. Is otherwise taking oxycodone and tylenol to manage the pain. No fevers/chills. No cough/shortness of breath/chest pain. No headaches or vision changes. Bowels are regular with the use of senna. Urination normal. No focal weakness. No bleeding or bruising.    Current Outpatient Prescriptions   Medication Sig Dispense Refill     lidocaine (LIDODERM) 5 % Patch Apply  up to 3 patches to painful area at once for up to 12 h within a 24 h period.  Remove after 12 hours. 30 patch 0     oxyCODONE (OXY-IR) 5 MG capsule Take 1 capsule (5 mg) by mouth every 4 hours as needed for moderate to severe pain 60 capsule 0     Acetaminophen (TYLENOL PO) Take 500 mg by mouth daily as needed        levETIRAcetam (KEPPRA) 100 MG/ML solution Take 10 mg/kg by mouth 2 times daily       CYCLOBENZAPRINE HCL PO Take 10 mg by mouth 3 times daily as needed        FOLIC ACID PO Take 800 mcg by mouth daily       fentaNYL (DURAGESIC) 25 mcg/hr patch 72 hr Place 1 patch onto the skin every 72 hours        diphenhydrAMINE (BENADRYL) 25 MG tablet Take 1-2 tablets (25-50 mg) by mouth every 6 hours as needed for itching or allergies 50 tablet 3     loratadine (CLARITIN) 10 MG tablet Take 1 tablet (10 mg) by mouth daily 30 tablet 3     famotidine (PEPCID) 10 MG tablet Take 2 tablets (20 mg) by mouth 2 times daily 20 tablet 1     ondansetron (ZOFRAN) 8 MG tablet Take 1 tablet (8 mg) by mouth every 8 hours as needed 9 tablet 0     PROCHLORPERAZINE MALEATE PO Take 10 mg by mouth every 6 hours as needed for nausea or vomiting       Exam: alert, appears well. Blood pressure 126/88, pulse 89, temperature 98.6  F (37  C), temperature source Oral, resp. rate 20, weight 116.302 kg (256 lb 6.4 oz), SpO2 98 %.  Oropharnyx is moist, no focal lesion. No icterus. Neck supple and without adenopathy. Lungs:CTA. Heart:RRR, no murmur or rub. Abdomen: soft, nontender, BS active. No masses. Extremities: no edema or rash. Speech is clear. CN wnl. Gait/station wnl.    Labs:  Results for OBDULIA CHIANG (MRN [id number]) as of 1/3/2017 14:39   Ref. Range 1/3/2017 14:10   Sodium Latest Ref Range: 133-144 mmol/L 135   Potassium Latest Ref Range: 3.4-5.3 mmol/L 4.2   Chloride Latest Ref Range:  mmol/L 102   Carbon Dioxide Latest Ref Range: 20-32 mmol/L 25   Urea Nitrogen Latest Ref Range: 7-30 mg/dL 16   Creatinine Latest Ref Range:  0.66-1.25 mg/dL 0.72   GFR Estimate Latest Ref Range: >60 mL/min/1.7m2 >90...   GFR Estimate If Black Latest Ref Range: >60 mL/min/1.7m2 >90...   Calcium Latest Ref Range: 8.5-10.1 mg/dL 9.3   Anion Gap Latest Ref Range: 3-14 mmol/L 8   Magnesium Latest Ref Range: 1.6-2.3 mg/dL 2.1   Phosphorus Latest Ref Range: 2.5-4.5 mg/dL 3.4   Albumin Latest Ref Range: 3.4-5.0 g/dL 3.4   Protein Total Latest Ref Range: 6.8-8.8 g/dL 9.4 (H)   Bilirubin Total Latest Ref Range: 0.2-1.3 mg/dL 0.3   Alkaline Phosphatase Latest Ref Range:  U/L 61   ALT Latest Ref Range: 0-70 U/L 19   AST Latest Ref Range: 0-45 U/L 23   Lactate Dehydrogenase Latest Ref Range:  U/L 437 (H)   Glucose Latest Ref Range: 70-99 mg/dL 88   WBC Latest Ref Range: 4.0-11.0 10e9/L 8.6   Hemoglobin Latest Ref Range: 13.3-17.7 g/dL 9.4 (L)   Hematocrit Latest Ref Range: 40.0-53.0 % 30.0 (L)   Platelet Count Latest Ref Range: 150-450 10e9/L 386   RBC Count Latest Ref Range: 4.4-5.9 10e12/L 3.37 (L)   MCV Latest Ref Range:  fl 89   MCH Latest Ref Range: 26.5-33.0 pg 27.9   MCHC Latest Ref Range: 31.5-36.5 g/dL 31.3 (L)   RDW Latest Ref Range: 10.0-15.0 % 14.2   Diff Method Unknown Automated Method   % Neutrophils Latest Units: % 66.8   % Lymphocytes Latest Units: % 23.0   % Monocytes Latest Units: % 7.3   % Eosinophils Latest Units: % 1.9   % Basophils Latest Units: % 0.7   % Immature Granulocytes Latest Units: % 0.3   Nucleated RBCs Latest Ref Range: 0 /100 0   Absolute Neutrophil Latest Ref Range: 1.6-8.3 10e9/L 5.7   Absolute Lymphocytes Latest Ref Range: 0.8-5.3 10e9/L 2.0   Absolute Monocytes Latest Ref Range: 0.0-1.3 10e9/L 0.6   Absolute Eosinophils Latest Ref Range: 0.0-0.7 10e9/L 0.2   Absolute Basophils Latest Ref Range: 0.0-0.2 10e9/L 0.1   Abs Immature Granulocytes Latest Ref Range: 0-0.4 10e9/L 0.0   Absolute Nucleated RBC Unknown 0.0     Impression/plan:  1. Metastatic NSCLC, currently on Nivolumab and , an IL-15 super  agonist  -Had pseudoprogression after first cycle of treatment. Has had improved tolerance to the study drug with less edema/rash.  -Will proceed with cycle 2 today, has weekly f/u while on the study.  -Repeat PET/CT imaging in 6 weeks per protocol    2. Drug rash: none at present. Can use benadryl, claritin, tylenol premedication prior to ALT-803    3. Back pain--initially thought to be related to bone metastases, but now thought it may be related to treatment related myalgias as there wasn't specific mass imaged on PET. Improved with ibuprofen  -Has a f/u MRI spine today to assess this.  -OK per protocol to take up to 2400 mg ibuprofen/day. No steroids. OK to take 1000 mg tylenol every 6 hours. OK to continue oxycodone and fentanyl patch 25 mcg.    4. Bone mets: on Zometa, due today. He hasn't been taking Calcium +D. Asked him to start doing that now.    5. Brain metastasis, s/p resection, stereotactic radiation on 9/16/16: due for brain re- imaging. Will try to add to today's scheduled spine MRI, if not, OK to wait until next week

## 2017-01-03 NOTE — MR AVS SNAPSHOT
After Visit Summary   1/3/2017    Naren Kimble    MRN: [id number]           Patient Information     Date Of Birth          1975        Visit Information        Provider Department      1/3/2017 3:00 PM  23 ATC;  ONCOLOGY INFUSION [facility]        Today's Diagnoses     Non-small cell lung cancer (NSCLC) (H)    -  1       Care Instructions    Contact Numbers    [facility] Main Line: [phone]  [facility] Triage:  [phone]    Call triage with chills and/or temperature greater than or equal to 100.5, uncontrolled nausea/vomiting, diarrhea, constipation, dizziness, shortness of breath, chest pain, bleeding, unexplained bruising, or any new/concerning symptoms, questions/concerns.     If you are having any concerning symptoms or wish to speak to a provider before your next infusion visit, please call your care coordinator or triage to notify them so we can adequately serve you.       After Hours: [phone]    If after hours, weekends, or holidays, call main hospital  and ask for Oncology doctor on call.           January 2017 Sunday Monday Tuesday Wednesday Thursday Friday Saturday   1     2     3     [facility] MASONIC LAB DRAW    1:30 PM   (15 min.)    MASONIC LAB DRAW   [facility] Lab Draw     [facility] RETURN    2:00 PM   (50 min.)   Yulia Lepe, DANIA CNP   [facility] ONC INFUSION 120    3:00 PM   (120 min.)    ONCOLOGY INFUSION   [facility]     MR BRAIN W    6:00 PM   (60 min.)   UR2   [facility], [facility]     MR THORACIC SPINE WWO    7:00 PM   (60 min.)   UR2   [facility], [facility]     MR LUMBAR SPINE WWO    8:00 PM   (60 min.)   UR2   [facility], MRI 4     5     6     [facility] LAB DRAW    9:15 AM   (15 min.)    MASONIC LAB DRAW   [facility] Masonic Lab Draw 7       8     9     10     [facility] LAB DRAW   10:45 AM   (15 min.)    [facility] LAB DRAW   [facility]     [facility] RETURN   11:20 AM    (50 min.)   Yulia Lepe APRN CNP   [facility]     [facility] ONC INFUSION 120    2:00 PM   (120 min.)   [facility] ONCOLOGY INFUSION   [facility] 11     12     13     14       15     16     17     [facility] LAB DRAW    9:45 AM   (15 min.)   [facility]   [facility]     [facility] RETURN   10:30 AM   (50 min.)   Yulia Lepe APRN CNP   [facility]     [facility] ONC INFUSION 120    1:30 PM   (120 min.)   [facility] ONCOLOGY INFUSION   [facility] 18     19     20     21       22     23     24     [facility] LAB DRAW    1:30 PM   (15 min.)   [facility] LAB DRAW   [facility] Lab Draw     [facility] RETURN    2:00 PM   (50 min.)   Yulia Lepe APRN CNP   [facility]     [facility] ONC INFUSION 120    3:00 PM   (120 min.)    ONCOLOGY INFUSION   [facility] 25     26     27     28       29     30     31     [facility]ON[facility] LAB DRAW    9:45 AM   (15 min.)   [facility] LAB DRAW   [facility] Masonic Lab Draw     [facility] RETURN   10:30 AM   (50 min.)   Yulia Lepe APRN CNP   [facility]     [facility] ONC INFUSION 120   12:00 PM   (120 min.)    ONCOLOGY INFUSION   [facility]                                February 2017 Sunday Monday Tuesday Wednesday Thursday Friday Saturday                  1     2     3     4       5     6     7     [facility] LAB DRAW    4:00 PM   (15 min.)   [facility] LAB DRAW   [facility] Masonic Lab Draw     [facility] RETURN    4:30 PM   (30 min.)   Khris Randle DO   [facility] 8     9     10     11       12     13     14     15     16     17     18       19     20     21     22     23  Happy Birthday!     24     25       26     27     28                                     Recent Results (from the past 24 hour(s))   Magnesium    Collection Time: 01/03/17  2:10 PM   Result Value Ref Range    Magnesium 2.1 1.6 - 2.3 mg/dL   Phosphorus     Collection Time: 01/03/17  2:10 PM   Result Value Ref Range    Phosphorus 3.4 2.5 - 4.5 mg/dL   Lactate Dehydrogenase    Collection Time: 01/03/17  2:10 PM   Result Value Ref Range    Lactate Dehydrogenase 437 (H) 85 - 227 U/L   CBC with platelets differential    Collection Time: 01/03/17  2:10 PM   Result Value Ref Range    WBC 8.6 4.0 - 11.0 10e9/L    RBC Count 3.37 (L) 4.4 - 5.9 10e12/L    Hemoglobin 9.4 (L) 13.3 - 17.7 g/dL    Hematocrit 30.0 (L) 40.0 - 53.0 %    MCV 89 78 - 100 fl    MCH 27.9 26.5 - 33.0 pg    MCHC 31.3 (L) 31.5 - 36.5 g/dL    RDW 14.2 10.0 - 15.0 %    Platelet Count 386 150 - 450 10e9/L    Diff Method Automated Method     % Neutrophils 66.8 %    % Lymphocytes 23.0 %    % Monocytes 7.3 %    % Eosinophils 1.9 %    % Basophils 0.7 %    % Immature Granulocytes 0.3 %    Nucleated RBCs 0 0 /100    Absolute Neutrophil 5.7 1.6 - 8.3 10e9/L    Absolute Lymphocytes 2.0 0.8 - 5.3 10e9/L    Absolute Monocytes 0.6 0.0 - 1.3 10e9/L    Absolute Eosinophils 0.2 0.0 - 0.7 10e9/L    Absolute Basophils 0.1 0.0 - 0.2 10e9/L    Abs Immature Granulocytes 0.0 0 - 0.4 10e9/L    Absolute Nucleated RBC 0.0    Comprehensive metabolic panel    Collection Time: 01/03/17  2:10 PM   Result Value Ref Range    Sodium 135 133 - 144 mmol/L    Potassium 4.2 3.4 - 5.3 mmol/L    Chloride 102 94 - 109 mmol/L    Carbon Dioxide 25 20 - 32 mmol/L    Anion Gap 8 3 - 14 mmol/L    Glucose 88 70 - 99 mg/dL    Urea Nitrogen 16 7 - 30 mg/dL    Creatinine 0.72 0.66 - 1.25 mg/dL    GFR Estimate >90  Non  GFR Calc   >60 mL/min/1.7m2    GFR Estimate If Black >90   GFR Calc   >60 mL/min/1.7m2    Calcium 9.3 8.5 - 10.1 mg/dL    Bilirubin Total 0.3 0.2 - 1.3 mg/dL    Albumin 3.4 3.4 - 5.0 g/dL    Protein Total 9.4 (H) 6.8 - 8.8 g/dL    Alkaline Phosphatase 61 40 - 150 U/L    ALT 19 0 - 70 U/L    AST 23 0 - 45 U/L                 Follow-ups after your visit        Your next 10 appointments already scheduled     Raymundo  03, 2017  6:00 PM   MR BRAIN W CONTRAST with UUMR2   [facility], [place], MRI ([facility])    [address] [facility] [address] [phone]           Take your medicines as usual, unless your doctor tells you not to. Bring a list of your current medicines to your exam (including vitamins, minerals and over-the-counter drugs).  You will be given intravenous contrast for this exam. To prepare:   The day before your exam, drink extra fluids at least six 8-ounce glasses (unless your doctor tells you to restrict your fluids).   Have a blood test (creatinine test) within 30 days of your exam. Go to your clinic or Diagnostic Imaging Department for this test.  The MRI machine uses a strong magnet. Please wear clothes without metal (snaps, zippers). A sweatsuit works well, or we may give you a hospital gown.  Please remove any body piercings and hair extensions before you arrive. You will also remove watches, jewelry, hairpins, wallets, dentures, partial dental plates and hearing aids. You may wear contact lenses, and you may be able to wear your rings. We have a safe place to keep your personal items, but it is safer to leave them at home.   **IMPORTANT** THE INSTRUCTIONS BELOW ARE ONLY FOR THOSE PATIENTS WHO HAVE BEEN TOLD THEY WILL RECEIVE SEDATION OR GENERAL ANESTHESIA DURING THEIR MRI PROCEDURE:  IF YOU WILL RECEIVE SEDATION (take medicine to help you relax during your exam):   You must get the medicine from your doctor before you arrive. Bring the medicine to the exam. Do not take it at home.   Arrive one hour early. Bring someone who can take you home after the test. Your medicine will make you sleepy. After the exam, you may not drive, take a bus or take a taxi by yourself.   No eating 8 hours before your exam. You may have clear liquids up until 4 hours before your exam. (Clear liquids include water, clear tea, black coffee and fruit juice without  pulp.)  IF YOU WILL RECEIVE ANESTHESIA (be asleep for your exam):   Arrive 1 1/2 hours early. Bring someone who can take you home after the test. You may not drive, take a bus or take a taxi by yourself.   No eating 8 hours before your exam. You may have clear liquids up until 4 hours before your exam. (Clear liquids include water, clear tea, black coffee and fruit juice without pulp.)  Please call the Imaging Department at your exam site with any questions.            Jan 03, 2017  7:00 PM   MR THORACIC SPINE W/O & W CONTRAST with UUMR2   [facility], [place], MRI ([facility])    [address] [facility] [address] [phone]           Take your medicines as usual, unless your doctor tells you not to. Bring a list of your current medicines to your exam (including vitamins, minerals and over-the-counter drugs).  You will be given intravenous contrast for this exam. To prepare:   The day before your exam, drink extra fluids at least six 8-ounce glasses (unless your doctor tells you to restrict your fluids).   Have a blood test (creatinine test) within 30 days of your exam. Go to your clinic or Diagnostic Imaging Department for this test.  The MRI machine uses a strong magnet. Please wear clothes without metal (snaps, zippers). A sweatsuit works well, or we may give you a hospital gown.  Please remove any body piercings and hair extensions before you arrive. You will also remove watches, jewelry, hairpins, wallets, dentures, partial dental plates and hearing aids. You may wear contact lenses, and you may be able to wear your rings. We have a safe place to keep your personal items, but it is safer to leave them at home.   **IMPORTANT** THE INSTRUCTIONS BELOW ARE ONLY FOR THOSE PATIENTS WHO HAVE BEEN TOLD THEY WILL RECEIVE SEDATION OR GENERAL ANESTHESIA DURING THEIR MRI PROCEDURE:  IF YOU WILL RECEIVE SEDATION (take medicine to help you relax during your  exam):   You must get the medicine from your doctor before you arrive. Bring the medicine to the exam. Do not take it at home.   Arrive one hour early. Bring someone who can take you home after the test. Your medicine will make you sleepy. After the exam, you may not drive, take a bus or take a taxi by yourself.   No eating 8 hours before your exam. You may have clear liquids up until 4 hours before your exam. (Clear liquids include water, clear tea, black coffee and fruit juice without pulp.)  IF YOU WILL RECEIVE ANESTHESIA (be asleep for your exam):   Arrive 1 1/2 hours early. Bring someone who can take you home after the test. You may not drive, take a bus or take a taxi by yourself.   No eating 8 hours before your exam. You may have clear liquids up until 4 hours before your exam. (Clear liquids include water, clear tea, black coffee and fruit juice without pulp.)  Please call the Imaging Department at your exam site with any questions.            Jan 03, 2017  8:00 PM   MR LUMBAR SPINE W/O & W CONTRAST with UUMR2   [facility], [place], MRI ([facility])    [address] [facility] [address] [phone]           Take your medicines as usual, unless your doctor tells you not to. Bring a list of your current medicines to your exam (including vitamins, minerals and over-the-counter drugs).  You will be given intravenous contrast for this exam. To prepare:   The day before your exam, drink extra fluids at least six 8-ounce glasses (unless your doctor tells you to restrict your fluids).   Have a blood test (creatinine test) within 30 days of your exam. Go to your clinic or Diagnostic Imaging Department for this test.  The MRI machine uses a strong magnet. Please wear clothes without metal (snaps, zippers). A sweatsuit works well, or we may give you a hospital gown.  Please remove any body piercings and hair extensions before you arrive. You will also  remove watches, jewelry, hairpins, wallets, dentures, partial dental plates and hearing aids. You may wear contact lenses, and you may be able to wear your rings. We have a safe place to keep your personal items, but it is safer to leave them at home.   **IMPORTANT** THE INSTRUCTIONS BELOW ARE ONLY FOR THOSE PATIENTS WHO HAVE BEEN TOLD THEY WILL RECEIVE SEDATION OR GENERAL ANESTHESIA DURING THEIR MRI PROCEDURE:  IF YOU WILL RECEIVE SEDATION (take medicine to help you relax during your exam):   You must get the medicine from your doctor before you arrive. Bring the medicine to the exam. Do not take it at home.   Arrive one hour early. Bring someone who can take you home after the test. Your medicine will make you sleepy. After the exam, you may not drive, take a bus or take a taxi by yourself.   No eating 8 hours before your exam. You may have clear liquids up until 4 hours before your exam. (Clear liquids include water, clear tea, black coffee and fruit juice without pulp.)  IF YOU WILL RECEIVE ANESTHESIA (be asleep for your exam):   Arrive 1 1/2 hours early. Bring someone who can take you home after the test. You may not drive, take a bus or take a taxi by yourself.   No eating 8 hours before your exam. You may have clear liquids up until 4 hours before your exam. (Clear liquids include water, clear tea, black coffee and fruit juice without pulp.)  Please call the Imaging Department at your exam site with any questions.            Jan 06, 2017  9:15 AM   Masonic Lab Draw with  MASONIC LAB DRAW   [facility] Masonic Lab Draw ([facility])    [address] [place] [address]   [phone]            Raymundo 10, 2017 10:45 AM   Masonic Lab Draw with  MASONIC LAB DRAW   [facility] Masonic Lab Draw ([facility])    [address] [place] [address]   [phone]            Raymundo 10, 2017 11:20 AM   (Arrive by 11:05 AM)   Return  Visit with DANIA Stallworth CNP   [facility] ([facility])    [address] [address]   [phone]            Raymundo 10, 2017  2:00 PM   Infusion 120 with  ONCOLOGY INFUSION, UC 14 ATC   [facility] ([facility])    [address] [place] [id number] [phone]            Jan 17, 2017  9:45 AM   Masonic Lab Draw with  MASONIC LAB DRAW   [facility] Lab Draw ([facility])    [address] [place] [address]   [phone]            Jan 17, 2017 10:30 AM   (Arrive by 10:15 AM)   Return Visit with DANIA Stallworth CNP   [facility] ([facility])    [address] [place] [id number] [phone]              Future tests that were ordered for you today     Open Future Orders        Priority Expected Expires Ordered    MR Brain w/o & w Contrast Routine  1/3/2018 1/3/2017            Who to contact     If you have questions or need follow up information about today's clinic visit or your schedule please contact [facility] directly at [phone].  Normal or non-critical lab and imaging results will be communicated to you by [employer], letter or phone within 4 business days after the clinic has received the results. If you do not hear from us within 7 days, please contact the clinic through [employer] or phone. If you have a critical or abnormal lab result, we will notify you by phone as soon as possible.  Submit refill requests through [employer] or call your pharmacy and they will forward the refill request to us. Please allow 3 business days for your refill to be completed.          Additional Information About Your Visit        Gen9 Information     Gen9 gives you secure access to your electronic health record. If you  see a primary care provider, you can also send messages to your care team and make appointments. If you have questions, please call your primary care clinic.  If you do not have a primary care provider, please call [phone] and they will assist you.        Care EveryWhere ID     This is your Care EveryWhere ID. This could be used by other organizations to access your [place] medical records  [id number]        Your Vitals Were     Pulse Temperature Respirations Pulse Oximetry          84 98.9  F (37.2  C) (Oral) 16 99%         Blood Pressure from Last 3 Encounters:   01/03/17 115/73   01/03/17 126/88   01/03/17 112/78    Weight from Last 3 Encounters:   01/03/17 116.302 kg (256 lb 6.4 oz)   12/27/16 117.4 kg (258 lb 13.1 oz)   12/20/16 119.614 kg (263 lb 11.2 oz)              We Performed the Following     CBC with platelets differential     Comprehensive metabolic panel     Lactate Dehydrogenase     Magnesium     MD Instruction for Protocol     No corticosteriods     Nursing Communication 1     Nursing Communication 1     Nursing Communication 2     Phosphorus     Research Lab     Treatment Conditions     Treatment Conditions     Vital signs     Weigh patient        Primary Care Provider    None Specified       No primary provider on file.        Thank you!     Thank you for choosing [facility]  for your care. Our goal is always to provide you with excellent care. Hearing back from our patients is one way we can continue to improve our services. Please take a few minutes to complete the written survey that you may receive in the mail after your visit with us. Thank you!             Your Updated Medication List - Protect others around you: Learn how to safely use, store and throw away your medicines at www.disposemymeds.org.          This list is accurate as of: 1/3/17  4:52 PM.  Always use your most recent med list.                   Brand Name Dispense Instructions for use     CYCLOBENZAPRINE HCL PO      Take 10 mg by mouth 3 times daily as needed       diphenhydrAMINE 25 MG tablet    BENADRYL    50 tablet    Take 1-2 tablets (25-50 mg) by mouth every 6 hours as needed for itching or allergies       famotidine 10 MG tablet    PEPCID    20 tablet    Take 2 tablets (20 mg) by mouth 2 times daily       fentaNYL 25 mcg/hr 72 hr patch    DURAGESIC     Place 1 patch onto the skin every 72 hours       FOLIC ACID PO      Take 800 mcg by mouth daily       levETIRAcetam 100 MG/ML solution    KEPPRA     Take 10 mg/kg by mouth 2 times daily       lidocaine 5 % Patch    LIDODERM    30 patch    Apply up to 3 patches to painful area at once for up to 12 h within a 24 h period.  Remove after 12 hours.       loratadine 10 MG tablet    CLARITIN    30 tablet    Take 1 tablet (10 mg) by mouth daily       ondansetron 8 MG tablet    ZOFRAN    9 tablet    Take 1 tablet (8 mg) by mouth every 8 hours as needed       oxyCODONE 5 MG capsule    OXY-IR    60 capsule    Take 1 capsule (5 mg) by mouth every 4 hours as needed for moderate to severe pain       PROCHLORPERAZINE MALEATE PO      Take 10 mg by mouth every 6 hours as needed for nausea or vomiting       TYLENOL PO      Take 500 mg by mouth daily as needed

## 2017-01-03 NOTE — PROGRESS NOTES
Research RN met with patient and wife for C2W1D1.  No evidence of edema, fevers or skin rash have occurred since last visit.  Chronic back and hip pain continue to be a problem causing patient to lay in bed all week, current rating is a 7 to 8 out of ten.  Pain was relieved with 2 days of ibuprofen per last week's instructions and patient would like to continue with ibuprofen going forward.  Per protocol, ibuprofen is allowed, but not to exceed 2400 mg in 24 hours.  MRI is scheduled to day to evaluate back pain.  Study drug will be administered today.

## 2017-01-05 NOTE — TELEPHONE ENCOUNTER
I talked with Valerio about the MRI spine/brain imaging and reviewed the possibility of radiation with Dr. Randle/Dr. Bronson. The study allows palliative radiation concurrently.     I reviewed the findings including:    The T2-3 vertebral involvement: There is effacement of the ventral subarachnoid space with abutment of the spinal cord, but no abnormal cord signal.   There is diffuse involvement of the metastatic lesions through the thoracic/lumbar/sacral spine, ileac bones with paraspinous extension at L1-L3 greatest on the left.  The prior PET/CT showed and enlarging, erosive metastases involving the pubic symphysis that make be contributing to his bilateral groin pain.    Valerio was willing to do palliative radiation as he is finding the need to schedule pain medications to manage the groin/hip pain, and back pain. Dr. Kovacs will see him in consultation to discuss palliative radiation.

## 2017-01-06 NOTE — NURSING NOTE
Chief Complaint   Patient presents with     Blood Draw     research labs drawn cy cma   See doc flowsheets for details.  LADARIUS Holguin, ALEX

## 2017-01-10 NOTE — NURSING NOTE
"Naren Kimble is a 41 year old male who presents for:  Chief Complaint   Patient presents with     Oncology Clinic Visit     Lung CA        Initial Vitals:  /85 mmHg  Pulse 91  Temp(Src) 97.4  F (36.3  C) (Tympanic)  Resp 18  Wt 115.486 kg (254 lb 9.6 oz)  SpO2 99% Estimated body mass index is 32.67 kg/(m^2) as calculated from the following:    Height as of 12/27/16: 1.88 m (6' 2\").    Weight as of this encounter: 115.486 kg (254 lb 9.6 oz).. There is no height on file to calculate BSA. BP completed using cuff size: NA (Not Taken)  Severe Pain (6) No LMP for male patient. Allergies and medications reviewed.     Medications: Medication REFILLS NEEDED TODAY  Pharmacy name entered into EPIC:    [facility] [place], [place] - [address] [place] [address] [phone]  [facility] [address] [place] [address] [address] [facility] [address]    Comments: Vitals taken in lab. Pt need a refill on his oxycodone    6 minutes for nursing intake (face to face time)   Sarah Dinh CMA          "

## 2017-01-10 NOTE — PATIENT INSTRUCTIONS
Contact Numbers  [facility] Nurse Triage: [phone]  After Hours Nurse Line:  [phone]  Hospital Main Line:  [phone]    Please call the [facility] Triage line if you experience a temperature greater than or equal to 100.5, shaking chills, have uncontrolled nausea, vomiting and/or diarrhea, dizziness, shortness of breath, chest pain, bleeding, unexplained bruising, or if you have any other new/concerning symptoms, questions or concerns.     If it is after hours, weekends, or holidays, please call either the after hours nurse line listed above or the main hospital  at  [phone] and ask to speak to the Oncology doctor on call.     If you are having any concerning symptoms or wish to speak to a provider before your next infusion visit, please call your care coordinator or triage to notify them so we can adequately serve you.     If you need a refill on a narcotic prescription or other medication, please call triage before your infusion appointment.         January 2017 Sunday Monday Tuesday Wednesday Thursday Friday Saturday   1     2     3     [facility]    1:30 PM   (15 min.)    [facility]     [facility] RETURN    2:00 PM   (50 min.)   Yulia Lepe APRN CNP   [facility] ONC INFUSION 120    3:00 PM   (120 min.)    ONCOLOGY INFUSION   [facility]     MR BRAIN W    6:00 PM   (60 min.)   [address], [facility]     MR THORACIC SPINE WWO    7:00 PM   (60 min.)   UR2   [facility], [facility]     MR LUMBAR SPINE WWO    8:00 PM   (60 min.)   [address], [facility] 4     5     6     [facility]    9:15 AM   (15 min.)    [facility] DRAW   [facility] 7       8     9     10     [facility]   10:45 AM   (15 min.)    [facility]   [facility]     [facility] RETURN   11:20 AM   (50 min.)   Yulia Lepe APRN CNP   [facility]      P ONC INFUSION 120    2:00 PM   (120 min.)   [facility] ONCOL[facility] INFUSION   [facility] 11     [facility] CONSULT    2:00 PM   (90 min.)   Eden Kovacs MD   [facility]     [facility] TCT/SIM SUITE    3:00 PM   (60 min.)   Eden Kovacs MD   [facility] 12     13     14       15     16     17     [facility]    9:45 AM   (15 min.)   [facility]   [facility] Draw     [facility] RETURN   10:00 AM   (30 min.)   Nenita Bartholomew MD   [facility]     [facility] ONC INFUSION 120    1:30 PM   (120 min.)   [facility] ONCOLOGY INFUSION   [facility] 18     19     20     21       22     23     24     [facility] D[facility]    1:30 PM   (15 min.)   [facility]     [facility] RETURN    2:00 PM   (50 min.)   Yulia Lepe APRN CNP   [facility]     [facility] ONC INFUSION 120    3:00 PM   (120 min.)   [facility] ONCOLOGY INFUSION   [facility] 25     26     27     28       29     30     31     [facility] DRAW    9:45 AM   (15 min.)   [facility] DRAW   [facility] Draw     [facility] RETURN   10:30 AM   (50 min.)   Yulia Lepe APRN CNP   [facility]     [facility] ONC INFUSION 120   12:00 PM   (120 min.)   [facility] ONCOLOGY INFUSION   [facility]                                February 2017 Sunday Monday Tuesday Wednesday Thursday Friday Saturday                  1     2     3     4       5     6     PET ONCOLOGY WHOLE BODY    7:30 AM   (45 min.)   [id number]   [facility] [place] PET CT 7     [facility] DRAW    4:00 PM   (15 min.)   [facility] DRAW   [facility] Draw     [facility] RETURN    4:30 PM   (30 min.)   Khris Randle DO   [facility] 8     9     10     11       12     13     14     15     16     17     18       19     20     21     22     23  Happy Birthday!     24     25       26     27     28                                       Lab Results:  Recent Results (from the past 12 hour(s))   CBC with platelets differential    Collection Time: 01/10/17 10:35 AM   Result Value Ref Range    WBC 7.9 4.0 - 11.0 10e9/L    RBC Count 3.36 (L) 4.4 - 5.9 10e12/L    Hemoglobin 9.1 (L) 13.3 - 17.7 g/dL    Hematocrit 29.2 (L) 40.0 - 53.0 %    MCV 87 78 - 100 fl    MCH 27.1 26.5 - 33.0 pg    MCHC 31.2 (L) 31.5 - 36.5 g/dL    RDW 14.0 10.0 - 15.0 %    Platelet Count 374 150 - 450 10e9/L    Diff Method Automated Method     % Neutrophils 60.1 %    % Lymphocytes 25.1 %    % Monocytes 10.2 %    % Eosinophils 3.7 %    % Basophils 0.5 %    % Immature Granulocytes 0.4 %    Nucleated RBCs 0 0 /100    Absolute Neutrophil 4.7 1.6 - 8.3 10e9/L    Absolute Lymphocytes 2.0 0.8 - 5.3 10e9/L    Absolute Monocytes 0.8 0.0 - 1.3 10e9/L    Absolute Eosinophils 0.3 0.0 - 0.7 10e9/L    Absolute Basophils 0.0 0.0 - 0.2 10e9/L    Abs Immature Granulocytes 0.0 0 - 0.4 10e9/L    Absolute Nucleated RBC 0.0    Comprehensive metabolic panel    Collection Time: 01/10/17 10:35 AM   Result Value Ref Range    Sodium 136 133 - 144 mmol/L    Potassium 4.8 3.4 - 5.3 mmol/L    Chloride 104 94 - 109 mmol/L    Carbon Dioxide 26 20 - 32 mmol/L    Anion Gap 6 3 - 14 mmol/L    Glucose 85 70 - 99 mg/dL    Urea Nitrogen 16 7 - 30 mg/dL    Creatinine 0.74 0.66 - 1.25 mg/dL    GFR Estimate >90  Non  GFR Calc   >60 mL/min/1.7m2    GFR Estimate If Black >90   GFR Calc   >60 mL/min/1.7m2    Calcium 9.2 8.5 - 10.1 mg/dL    Bilirubin Total 0.3 0.2 - 1.3 mg/dL    Albumin 3.1 (L) 3.4 - 5.0 g/dL    Protein Total 9.2 (H) 6.8 - 8.8 g/dL    Alkaline Phosphatase 60 40 - 150 U/L    ALT 19 0 - 70 U/L    AST 22 0 - 45 U/L   Magnesium    Collection Time: 01/10/17 10:35 AM   Result Value Ref Range    Magnesium 2.0 1.6 - 2.3 mg/dL   Phosphorus    Collection Time: 01/10/17 10:35 AM   Result Value Ref Range    Phosphorus 3.4 2.5 - 4.5 mg/dL   Lactate Dehydrogenase     Collection Time: 01/10/17 10:35 AM   Result Value Ref Range    Lactate Dehydrogenase 391 (H) 85 - 227 U/L

## 2017-01-10 NOTE — NURSING NOTE
Performed post dose vitals on pt in clinic for research.     Pre dose vitals:  12:57 p.m.-  BP- 133/83  P- 95  R- 16  T- 97.6  O2- 99%    Post dose vitals:  15 minutes post were taken at 13:17  BP- 130/76  P- 90  R- 16  T- 98.8  O2- 100%    30 minutes post were taken at 13:33  BP- 136/83  P-103  R-16  T-97.9  O2-100%    60 minutes post were taken at 14:01  BP-136/86  P-94  R-16  T-98.2  O2-100    120 minutes post were taken at 14:59  BP-134/74  P-99  R-16  T-98.3  O2- 99%  Shana Motley, CMA

## 2017-01-10 NOTE — PROGRESS NOTES
Naren Kimble is a 41 year old man with metastatic NSCLC, seen for evaluation prior to c2, d8 ALT-803/Nivolumab.    Oncology HPI:    Stage IV non-small cell lung cancer.  Initially presented in 07/2016 with a brain mass, which was resected and was consistent with metastatic adenocarcinoma of the lung. He was treated with stereotactic brain radiation to the tumor bed.  Negative EGFR and ALK rearrangement studies.   ROS1 studies are not known.  CT scan in 07/2016 did not have any definitive mass in the lung.  PET/CT 08/2015 showed 1 cm mass in the left lower lobe.  Bronchoscopy was negative with the [facility] system.  He proceeded with cisplatin and Alimta after stereotactic radiation to resection bed and started chemotherapy 09/19/2016.  Imaging after 2 cycles of chemotherapy were consistent with metastatic disease in the bone.  He was started on Zometa. He progressed on cisplatin/alimta and was started on a clinical trial with ALT-803 plus nivolumab. He had increased edema, fevers and skin rash with the first 2 treatments. Because of this he only received Nivolumab on day 15. He proceeded with subsequent treatment without further difficulty. 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    Interval history:  Valerio has been feeling well. Back/groin pain is better controlled since he resumed use of fentanyl patches. Is taking oxycodone 5 mg every 4 hours and alternating ibuprofen/tylenol doses. No fevers/chills. No cough or shortness of breath. No headaches/vision changes. No focal weakness or sensory changes. Bowels are a little constipated. Using senna or MOM to help with that. Bladder function is normal. Has some rash at the injection site that lasts for   Current Outpatient Prescriptions   Medication Sig Dispense Refill     lidocaine (LIDODERM) 5 % Patch Apply up to 3 patches to painful area at once for up to 12 h within a 24 h period.  Remove  after 12 hours. 30 patch 0     oxyCODONE (OXY-IR) 5 MG capsule Take 1 capsule (5 mg) by mouth every 4 hours as needed for moderate to severe pain 60 capsule 0     diphenhydrAMINE (BENADRYL) 25 MG tablet Take 1-2 tablets (25-50 mg) by mouth every 6 hours as needed for itching or allergies 50 tablet 3     loratadine (CLARITIN) 10 MG tablet Take 1 tablet (10 mg) by mouth daily 30 tablet 3     Acetaminophen (TYLENOL PO) Take 500 mg by mouth daily as needed        famotidine (PEPCID) 10 MG tablet Take 2 tablets (20 mg) by mouth 2 times daily 20 tablet 1     ondansetron (ZOFRAN) 8 MG tablet Take 1 tablet (8 mg) by mouth every 8 hours as needed 9 tablet 0     levETIRAcetam (KEPPRA) 100 MG/ML solution Take 10 mg/kg by mouth 2 times daily       CYCLOBENZAPRINE HCL PO Take 10 mg by mouth 3 times daily as needed        PROCHLORPERAZINE MALEATE PO Take 10 mg by mouth every 6 hours as needed for nausea or vomiting       FOLIC ACID PO Take 800 mcg by mouth daily       fentaNYL (DURAGESIC) 25 mcg/hr patch 72 hr Place 1 patch onto the skin every 72 hours        Exam:  Alert, appears in NAD. Blood pressure 125/85, pulse 91, temperature 97.4  F (36.3  C), temperature source Tympanic, resp. rate 18, weight 115.486 kg (254 lb 9.6 oz), SpO2 99 %.  Oropharynx is moist, no focal lesion. No icterus. Neck supple and without adenopathy. Lungs: CTA. Heart: RRR, no murmur or rub. Abdomen: soft, nontender, BS active. No masses or organomegaly. Extremities: warm, no edema. Speech clear. CN wnl. No rashes or bruises.    Labs:  Results for OBDULIA CHIANG (MRN [id number]) as of 1/10/2017 11:31   Ref. Range 1/10/2017 10:35   Sodium Latest Ref Range: 133-144 mmol/L 136   Potassium Latest Ref Range: 3.4-5.3 mmol/L 4.8   Chloride Latest Ref Range:  mmol/L 104   Carbon Dioxide Latest Ref Range: 20-32 mmol/L 26   Urea Nitrogen Latest Ref Range: 7-30 mg/dL 16   Creatinine Latest Ref Range: 0.66-1.25 mg/dL 0.74   GFR Estimate Latest Ref Range: >60  mL/min/1.7m2 >90...   GFR Estimate If Black Latest Ref Range: >60 mL/min/1.7m2 >90...   Calcium Latest Ref Range: 8.5-10.1 mg/dL 9.2   Anion Gap Latest Ref Range: 3-14 mmol/L 6   Magnesium Latest Ref Range: 1.6-2.3 mg/dL 2.0   Phosphorus Latest Ref Range: 2.5-4.5 mg/dL 3.4   Albumin Latest Ref Range: 3.4-5.0 g/dL 3.1 (L)   Protein Total Latest Ref Range: 6.8-8.8 g/dL 9.2 (H)   Bilirubin Total Latest Ref Range: 0.2-1.3 mg/dL 0.3   Alkaline Phosphatase Latest Ref Range:  U/L 60   ALT Latest Ref Range: 0-70 U/L 19   AST Latest Ref Range: 0-45 U/L 22   Lactate Dehydrogenase Latest Ref Range:  U/L 391 (H)   Glucose Latest Ref Range: 70-99 mg/dL 85   WBC Latest Ref Range: 4.0-11.0 10e9/L 7.9   Hemoglobin Latest Ref Range: 13.3-17.7 g/dL 9.1 (L)   Hematocrit Latest Ref Range: 40.0-53.0 % 29.2 (L)   Platelet Count Latest Ref Range: 150-450 10e9/L 374   RBC Count Latest Ref Range: 4.4-5.9 10e12/L 3.36 (L)   MCV Latest Ref Range:  fl 87   MCH Latest Ref Range: 26.5-33.0 pg 27.1   MCHC Latest Ref Range: 31.5-36.5 g/dL 31.2 (L)   RDW Latest Ref Range: 10.0-15.0 % 14.0   Diff Method Unknown Automated Method   % Neutrophils Latest Units: % 60.1   % Lymphocytes Latest Units: % 25.1   % Monocytes Latest Units: % 10.2   % Eosinophils Latest Units: % 3.7   % Basophils Latest Units: % 0.5   % Immature Granulocytes Latest Units: % 0.4   Nucleated RBCs Latest Ref Range: 0 /100 0   Absolute Neutrophil Latest Ref Range: 1.6-8.3 10e9/L 4.7   Absolute Lymphocytes Latest Ref Range: 0.8-5.3 10e9/L 2.0   Absolute Monocytes Latest Ref Range: 0.0-1.3 10e9/L 0.8   Absolute Eosinophils Latest Ref Range: 0.0-0.7 10e9/L 0.3   Absolute Basophils Latest Ref Range: 0.0-0.2 10e9/L 0.0   Abs Immature Granulocytes Latest Ref Range: 0-0.4 10e9/L 0.0   Absolute Nucleated RBC Unknown 0.0     Imaging:  MR BRAIN W/O & W CONTRAST 1/3/2017 7:53 PM     History: Secondary malignant neoplasm of brain, Malignant neoplasm of  unspecified part of  unspecified bronchus or lung.     Comparison: Outside MRI 9/6/2016, 7/28/2016, 7/27/2016 and 7/26/2016.     Technique: Multiplanar T1-weighted, axial FLAIR, and susceptibility  images were obtained without intravenous contrast. Following  intravenous gadolinium-based contrast administration, axial  T2-weighted, diffusion, and T1-weighted images (in multiple planes)  were obtained.     Contrast: 10 cc Gadavist     Findings:  Postsurgical changes of left frontal craniotomy with underlying  resection cavity. Compared to the most recent postsurgical MRI from  9/6/2016, there has been interval decrease in size of the resection  cavity. Stable hemosiderin along the margins of the resection cavity  and over the high left frontal lobe. Slight interval decrease in the  extent of enhancement surrounding the resection cavity. There is again  T2 hyperintense signal in the deep and subcortical white matter of the  left frontal lobe, at the medial, posterior and inferior aspects of  the resection cavity. Unchanged dural thickening and enhancement along  the anterior falx and underlying the craniotomy. No new lesion.     There is 1.1 x 1.0 cm, T2 centrally hyperintense peripherally  hypointense, T1 mostly isointense with central hyperintense portion,  lesion in the left superior cerebellar hemisphere with associated  susceptibility effect on SWI. On postcontrast images, there is mild  internal enhancement. No associated surrounding edema. This lesion is  stable since 7/26/2016 and most likely represents a cavernoma.     There is no midline shift. The ventricles are not enlarged out of  proportion to cerebral sulci. No diffusion restriction. Patent major  intracranial flow-voids.     Small left sphenoid sinus mucous retention cyst. The mastoid air cells  are clear. The orbits are unremarkable.                                                                       Impression:  1. Evolving postsurgical changes of left frontal mass  resection. No  tumor recurrence or new lesion.  2. 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     I have personally reviewed the examination and initial interpretation  and I agree with the findings.     WILMER STONE MD    MR LUMBAR SPINE W/O & W CONTRAST, MR THORACIC SPINE W/O &  W CONTRAST 1/3/2017 7:37 PM     History: NSCLC, bone mets, acute lower back pain, no neurological sxs,  evaluate for cord compression, intramedullary met or compression fx,  Malignant neoplasm of unspecified part of unspecified bronchus or  lung, Low back pain     Comparison: PET/CT 12/20/2016 outside lumbar MR 10/18/2016     Technique:     Thoracic spine: Sagittal T1-weighted, sagittal T2-weighted, sagittal  STIR, sagittal diffusion weighted, axial T1-weighted, and axial  T2-weighted images of the thoracic spine were obtained without the  administration of intravenous contrast. After the administration of  intravenous contrast, fat saturated axial, and sagittal T1-weighted  images of the thoracic spine were obtained.     Lumbar spine: Sagittal T1-weighted, sagittal T2-weighted, sagittal  STIR, sagittal diffusion-weighted, axial T1-weighted, and axial  T2-weighted images of the lumbar spine were obtained without the  administration of intravenous contrast. After the administration of  intravenous contrast, axial and sagittal fat-saturated T1-weighted  images of the lumbar spine were obtained.     Findings:     The thoracic vertebrae are in normal alignment.        There is diffuse metastatic involvement of the all thoracic vertebral  bodies, partially visualized sternum and multiple ribs, as well as  multiple cervical vertebrae. There is also involvement of the spinous  processes of T2 and T3 and the right transverse process of T10. There  is ventral epidural and right foraminal extension of the mass at T2-3  with resultant mild spinal canal and moderate right neural foraminal  narrowing.  There is effacement of the ventral subarachnoid space by  this mass with abutment of the spinal cord. No abnormal cord signal.  Mild superior endplate pathologic compression fracture of T3.     There is paraspinous extension of the masses at T3 and T4 on both  sides, T7 on the right, and T8 on the right. At T8, the mass seems to  involve the parietal pleura.       There is a 2.8 x 2.1 cm mass at T1-2 adjacent to the left second rib.  There is 4 x 3.2 cm mass in the right adrenal gland. Bilateral hilar  lymphadenopathy and partially visualized 2.7 cm mass in the left lung,  appear grossly unchanged from the 12/23/2016 dated PET/CT.     Multilevel Schmorl's node-like endplate deformities. Mild disc height  narrowing at T3-4 and T4-5. Right central disc protrusion with  effacement of the ventral subarachnoid space and flattening of the  cord at T4-5.     Lumbar:    There are 5 lumbar-type vertebrae.       There are increased diffuse metastatic lesions throughout the lumbar  and sacral vertebral bodies and bilateral iliac bones since  10/18/2016. There is also involvement of the right inferior articular  process of L4. There is no spinal canal or neural foraminal extension  of metastasis. However, there is paraspinous extension at L1-L3,  greatest on the left. No pathologic compression fracture.     The tip of the conus medullaris is at upper L1.  The lumbar vertebral  column appears normally aligned.  There is mild disc height narrowing  and varying degrees of disc dehydration from L3-S1.       On a level by level basis:     L1-2: Left central/subarticular disc protrusion. Bilateral facet  hypertrophy. No spinal canal or neural foraminal stenosis.     L2-3: Right asymmetric disc bulge and bilateral facet hypertrophy.  Mild spinal canal narrowing. Mild right neural foraminal narrowing. No  left neural foraminal stenosis.     L3-4: Circumferential disc bulge and bilateral facet hypertrophy.  Effacement of the lateral  recesses with possible abutment of the  traversing bilateral L4 nerve roots. Mild bilateral neural foraminal  narrowing. Mild spinal canal narrowing.     L4-5: Disc bulge and superimposed central protrusion. Bilateral facet  hypertrophy. Mild spinal canal and mild bilateral neural foraminal  narrowing.     L5-S1: Disc bulge and superimposed right central protrusion abuts the  traversing right S1 nerve root at the lateral recess. Mild right  neural foraminal narrowing. No spinal canal or left neural foraminal  stenosis.                                                                       Impression:    1. Diffuse metastatic involvement of the cervical and thoracic  vertebrae, multiple ribs, and sternum, grossly unchanged since the  12/20/2016 dated PET/CT accounting for the differences in technique.  Ventral epidural and neural foraminal extension of the metastasis at  T2-3 results in mild spinal canal and moderate right neural foraminal  stenosis. There is also abutment of the cord without abnormal cord  signal.    2. Mild pathologic compression fracture of the T3 upper vertebral  body, unchanged from 12/23/2016.  3. Partially visualized bilateral hilar lymphadenopathy, left lung and  right adrenal gland metastases, also stable since 12/23/2016.  4. Diffuse metastatic involvement of the lumbosacral vertebrae and  bilateral iliac bones, increased from the 10/18/2016 dated MRI. No  lumbar epidural, spinal canal or neural foraminal extension. No lumbar  pathologic compression fracture.  5. Multilevel lumbar degenerative changes with mild spinal canal  narrowing from L2-3 through L4-5. Mild bilateral neural foraminal  narrowing at L3-4, L4-5 and mild right neural foraminal narrowing at  L5-S1.       I have personally reviewed the examination and initial interpretation  and I agree with the findings.     WILMER STONE MD    MR LUMBAR SPINE W/O & W CONTRAST, MR THORACIC SPINE W/O &  W CONTRAST 1/3/2017 7:37  PM     History: NSCLC, bone mets, acute lower back pain, no neurological sxs,  evaluate for cord compression, intramedullary met or compression fx,  Malignant neoplasm of unspecified part of unspecified bronchus or  lung, Low back pain     Comparison: PET/CT 12/20/2016 outside lumbar MR 10/18/2016     Technique:     Thoracic spine: Sagittal T1-weighted, sagittal T2-weighted, sagittal  STIR, sagittal diffusion weighted, axial T1-weighted, and axial  T2-weighted images of the thoracic spine were obtained without the  administration of intravenous contrast. After the administration of  intravenous contrast, fat saturated axial, and sagittal T1-weighted  images of the thoracic spine were obtained.     Lumbar spine: Sagittal T1-weighted, sagittal T2-weighted, sagittal  STIR, sagittal diffusion-weighted, axial T1-weighted, and axial  T2-weighted images of the lumbar spine were obtained without the  administration of intravenous contrast. After the administration of  intravenous contrast, axial and sagittal fat-saturated T1-weighted  images of the lumbar spine were obtained.     Findings:     The thoracic vertebrae are in normal alignment.        There is diffuse metastatic involvement of the all thoracic vertebral  bodies, partially visualized sternum and multiple ribs, as well as  multiple cervical vertebrae. There is also involvement of the spinous  processes of T2 and T3 and the right transverse process of T10. There  is ventral epidural and right foraminal extension of the mass at T2-3  with resultant mild spinal canal and moderate right neural foraminal  narrowing. There is effacement of the ventral subarachnoid space by  this mass with abutment of the spinal cord. No abnormal cord signal.  Mild superior endplate pathologic compression fracture of T3.     There is paraspinous extension of the masses at T3 and T4 on both  sides, T7 on the right, and T8 on the right. At T8, the mass seems to  involve the parietal  pleura.       There is a 2.8 x 2.1 cm mass at T1-2 adjacent to the left second rib.  There is 4 x 3.2 cm mass in the right adrenal gland. Bilateral hilar  lymphadenopathy and partially visualized 2.7 cm mass in the left lung,  appear grossly unchanged from the 12/23/2016 dated PET/CT.     Multilevel Schmorl's node-like endplate deformities. Mild disc height  narrowing at T3-4 and T4-5. Right central disc protrusion with  effacement of the ventral subarachnoid space and flattening of the  cord at T4-5.     Lumbar:    There are 5 lumbar-type vertebrae.       There are increased diffuse metastatic lesions throughout the lumbar  and sacral vertebral bodies and bilateral iliac bones since  10/18/2016. There is also involvement of the right inferior articular  process of L4. There is no spinal canal or neural foraminal extension  of metastasis. However, there is paraspinous extension at L1-L3,  greatest on the left. No pathologic compression fracture.     The tip of the conus medullaris is at upper L1.  The lumbar vertebral  column appears normally aligned.  There is mild disc height narrowing  and varying degrees of disc dehydration from L3-S1.       On a level by level basis:     L1-2: Left central/subarticular disc protrusion. Bilateral facet  hypertrophy. No spinal canal or neural foraminal stenosis.     L2-3: Right asymmetric disc bulge and bilateral facet hypertrophy.  Mild spinal canal narrowing. Mild right neural foraminal narrowing. No  left neural foraminal stenosis.     L3-4: Circumferential disc bulge and bilateral facet hypertrophy.  Effacement of the lateral recesses with possible abutment of the  traversing bilateral L4 nerve roots. Mild bilateral neural foraminal  narrowing. Mild spinal canal narrowing.     L4-5: Disc bulge and superimposed central protrusion. Bilateral facet  hypertrophy. Mild spinal canal and mild bilateral neural foraminal  narrowing.     L5-S1: Disc bulge and superimposed right central  protrusion abuts the  traversing right S1 nerve root at the lateral recess. Mild right  neural foraminal narrowing. No spinal canal or left neural foraminal  stenosis.                                                                       Impression:    1. Diffuse metastatic involvement of the cervical and thoracic  vertebrae, multiple ribs, and sternum, grossly unchanged since the  12/20/2016 dated PET/CT accounting for the differences in technique.  Ventral epidural and neural foraminal extension of the metastasis at  T2-3 results in mild spinal canal and moderate right neural foraminal  stenosis. There is also abutment of the cord without abnormal cord  signal.    2. Mild pathologic compression fracture of the T3 upper vertebral  body, unchanged from 12/23/2016.  3. Partially visualized bilateral hilar lymphadenopathy, left lung and  right adrenal gland metastases, also stable since 12/23/2016.  4. Diffuse metastatic involvement of the lumbosacral vertebrae and  bilateral iliac bones, increased from the 10/18/2016 dated MRI. No  lumbar epidural, spinal canal or neural foraminal extension. No lumbar  pathologic compression fracture.  5. Multilevel lumbar degenerative changes with mild spinal canal  narrowing from L2-3 through L4-5. Mild bilateral neural foraminal  narrowing at L3-4, L4-5 and mild right neural foraminal narrowing at  L5-S1.       I have personally reviewed the examination and initial interpretation  and I agree with the findings.     WILMER STONE MD    Impression/plan:  1. 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 Has had improved tolerance to the study drug with less edema/rash. Appetite is improving and pain is under better control  -Will proceed with cycle 2, D8 today, has weekly f/u while on the study.  -Repeat PET/CT imaging in 4 weeks per protocol    2. Drug rash: none at present. Can use benadryl,  claritin, tylenol premedication prior to ALT-803, but hasn't needed to use that with the last few doses.    3. Back pain-has diffuse metastatic disease. Concerned about T2-3 lesion causing future complications. Low back pain and hip pain likely related to L spine and possibly pubic symphysis metastases  -Seeing Rad/Onc for palliative radiation tomorrow  -Pain is better controlled now with Fentanyl 25 mcg every 72 hours and oxycodone 5 mg every 4 hours as needed (refill given today). Using tylenol/ibuprofen in alternating doses  -OK per protocol to take up to 2400 mg ibuprofen/day. No steroids. OK to take 1000 mg tylenol every 6 hours.4. Bone mets: on Zometa, due today. He hasn't been taking Calcium +D. Asked him to start doing that now.    4. Brain metastasis, s/p resection, stereotactic radiation on 9/16/16: stable on MRI last week.     5. Constipation: Opioid related. OK to increase senna to 2 bid, up to 8tabs/daily as needed. OK to uses MOM or miralax as well.

## 2017-01-10 NOTE — PROGRESS NOTES
Infusion Nursing Note:  Naren Kimble presents today for Cycle 2 Day 8 ALT-803 Study Drug ([facility] 4909).    Patient seen by provider today: Yes: Yulia Lepe DNP    Note: Pt took own Tylenol 1000 mg at 1100. Yulia Lepe DNP and Vick Grier RN confirmed ok to not repeat.  Vick Saldana CMA took vitals per protocol.  Confirmed with Vick Grier RN OK to give Benadryl 30 minutes prior to injection instead of ordered 60 minutes.    Treatment Conditions:  HGB      9.1   1/10/2017  WBC      7.9   1/10/2017   ANEU      4.7   1/10/2017  PLT      374   1/10/2017     NA      136   1/10/2017                POTASSIUM      4.8   1/10/2017        MAG      2.0   1/10/2017         CR     0.74   1/10/2017                ROSENDO      9.2   1/10/2017             BILITOTAL      0.3   1/10/2017        ALBUMIN      3.1   1/10/2017                 ALT       19   1/10/2017        AST       22   1/10/2017  Results reviewed, labs MET treatment parameters, ok to proceed with treatment.    Post Infusion Assessment:  Patient tolerated 2 SQ injections to right abdomen without incident.  Monitored for 2 hours post injections.  Pt to take Tylenol 4 hours post injection.    Discharge Plan:   Copy of AVS reviewed with patient and/or family.  Patient will return 1/17 for next appointment.  Patient discharged in stable condition accompanied by: wife.  Departure Mode: Ambulatory.    Batsheva Cristobal RN

## 2017-01-10 NOTE — Clinical Note
1/10/2017       RE: Naren Kimble  [address]  [facility] [address]     Dear Colleague,    Thank you for referring your patient, Naren Kimble, to the [facility]. Please see a copy of my visit note below.    Naren Kimble is a 41 year old man with metastatic NSCLC, seen for evaluation prior to c2, d8 ALT-803/Nivolumab.    Oncology HPI:    Stage IV non-small cell lung cancer.  Initially presented in 07/2016 with a brain mass, which was resected and was consistent with metastatic adenocarcinoma of the lung. He was treated with stereotactic brain radiation to the tumor bed.  Negative EGFR and ALK rearrangement studies.   ROS1 studies are not known.  CT scan in 07/2016 did not have any definitive mass in the lung.  PET/CT 08/2015 showed 1 cm mass in the left lower lobe.  Bronchoscopy was negative with the [facility] system.  He proceeded with cisplatin and Alimta after stereotactic radiation to resection bed and started chemotherapy 09/19/2016.  Imaging after 2 cycles of chemotherapy were consistent with metastatic disease in the bone.  He was started on Zometa. He progressed on cisplatin/alimta and was started on a clinical trial with ALT-803 plus nivolumab. He had increased edema, fevers and skin rash with the first 2 treatments. Because of this he only received Nivolumab on day 15. He proceeded with subsequent treatment without further difficulty. 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    Interval history:  Valerio has been feeling well. Back/groin pain is better controlled since he resumed use of fentanyl patches. Is taking oxycodone 5 mg every 4 hours and alternating ibuprofen/tylenol doses. No fevers/chills. No cough or shortness of breath. No headaches/vision changes. No focal weakness or sensory changes. Bowels are a little constipated. Using senna or MOM to help with that. Bladder function is normal. Has some  rash at the injection site that lasts for   Current Outpatient Prescriptions   Medication Sig Dispense Refill     lidocaine (LIDODERM) 5 % Patch Apply up to 3 patches to painful area at once for up to 12 h within a 24 h period.  Remove after 12 hours. 30 patch 0     oxyCODONE (OXY-IR) 5 MG capsule Take 1 capsule (5 mg) by mouth every 4 hours as needed for moderate to severe pain 60 capsule 0     diphenhydrAMINE (BENADRYL) 25 MG tablet Take 1-2 tablets (25-50 mg) by mouth every 6 hours as needed for itching or allergies 50 tablet 3     loratadine (CLARITIN) 10 MG tablet Take 1 tablet (10 mg) by mouth daily 30 tablet 3     Acetaminophen (TYLENOL PO) Take 500 mg by mouth daily as needed        famotidine (PEPCID) 10 MG tablet Take 2 tablets (20 mg) by mouth 2 times daily 20 tablet 1     ondansetron (ZOFRAN) 8 MG tablet Take 1 tablet (8 mg) by mouth every 8 hours as needed 9 tablet 0     levETIRAcetam (KEPPRA) 100 MG/ML solution Take 10 mg/kg by mouth 2 times daily       CYCLOBENZAPRINE HCL PO Take 10 mg by mouth 3 times daily as needed        PROCHLORPERAZINE MALEATE PO Take 10 mg by mouth every 6 hours as needed for nausea or vomiting       FOLIC ACID PO Take 800 mcg by mouth daily       fentaNYL (DURAGESIC) 25 mcg/hr patch 72 hr Place 1 patch onto the skin every 72 hours        Exam:  Alert, appears in NAD. Blood pressure 125/85, pulse 91, temperature 97.4  F (36.3  C), temperature source Tympanic, resp. rate 18, weight 115.486 kg (254 lb 9.6 oz), SpO2 99 %.  Oropharynx is moist, no focal lesion. No icterus. Neck supple and without adenopathy. Lungs: CTA. Heart: RRR, no murmur or rub. Abdomen: soft, nontender, BS active. No masses or organomegaly. Extremities: warm, no edema. Speech clear. CN wnl. No rashes or bruises.    Labs:  Results for OBDULIA CHIANG (MRN [id number]) as of 1/10/2017 11:31   Ref. Range 1/10/2017 10:35   Sodium Latest Ref Range: 133-144 mmol/L 136   Potassium Latest Ref Range: 3.4-5.3 mmol/L  4.8   Chloride Latest Ref Range:  mmol/L 104   Carbon Dioxide Latest Ref Range: 20-32 mmol/L 26   Urea Nitrogen Latest Ref Range: 7-30 mg/dL 16   Creatinine Latest Ref Range: 0.66-1.25 mg/dL 0.74   GFR Estimate Latest Ref Range: >60 mL/min/1.7m2 >90...   GFR Estimate If Black Latest Ref Range: >60 mL/min/1.7m2 >90...   Calcium Latest Ref Range: 8.5-10.1 mg/dL 9.2   Anion Gap Latest Ref Range: 3-14 mmol/L 6   Magnesium Latest Ref Range: 1.6-2.3 mg/dL 2.0   Phosphorus Latest Ref Range: 2.5-4.5 mg/dL 3.4   Albumin Latest Ref Range: 3.4-5.0 g/dL 3.1 (L)   Protein Total Latest Ref Range: 6.8-8.8 g/dL 9.2 (H)   Bilirubin Total Latest Ref Range: 0.2-1.3 mg/dL 0.3   Alkaline Phosphatase Latest Ref Range:  U/L 60   ALT Latest Ref Range: 0-70 U/L 19   AST Latest Ref Range: 0-45 U/L 22   Lactate Dehydrogenase Latest Ref Range:  U/L 391 (H)   Glucose Latest Ref Range: 70-99 mg/dL 85   WBC Latest Ref Range: 4.0-11.0 10e9/L 7.9   Hemoglobin Latest Ref Range: 13.3-17.7 g/dL 9.1 (L)   Hematocrit Latest Ref Range: 40.0-53.0 % 29.2 (L)   Platelet Count Latest Ref Range: 150-450 10e9/L 374   RBC Count Latest Ref Range: 4.4-5.9 10e12/L 3.36 (L)   MCV Latest Ref Range:  fl 87   MCH Latest Ref Range: 26.5-33.0 pg 27.1   MCHC Latest Ref Range: 31.5-36.5 g/dL 31.2 (L)   RDW Latest Ref Range: 10.0-15.0 % 14.0   Diff Method Unknown Automated Method   % Neutrophils Latest Units: % 60.1   % Lymphocytes Latest Units: % 25.1   % Monocytes Latest Units: % 10.2   % Eosinophils Latest Units: % 3.7   % Basophils Latest Units: % 0.5   % Immature Granulocytes Latest Units: % 0.4   Nucleated RBCs Latest Ref Range: 0 /100 0   Absolute Neutrophil Latest Ref Range: 1.6-8.3 10e9/L 4.7   Absolute Lymphocytes Latest Ref Range: 0.8-5.3 10e9/L 2.0   Absolute Monocytes Latest Ref Range: 0.0-1.3 10e9/L 0.8   Absolute Eosinophils Latest Ref Range: 0.0-0.7 10e9/L 0.3   Absolute Basophils Latest Ref Range: 0.0-0.2 10e9/L 0.0   Abs Immature  Granulocytes Latest Ref Range: 0-0.4 10e9/L 0.0   Absolute Nucleated RBC Unknown 0.0     Imaging:  MR BRAIN W/O & W CONTRAST 1/3/2017 7:53 PM     History: Secondary malignant neoplasm of brain, Malignant neoplasm of  unspecified part of unspecified bronchus or lung.     Comparison: Outside MRI 9/6/2016, 7/28/2016, 7/27/2016 and 7/26/2016.     Technique: Multiplanar T1-weighted, axial FLAIR, and susceptibility  images were obtained without intravenous contrast. Following  intravenous gadolinium-based contrast administration, axial  T2-weighted, diffusion, and T1-weighted images (in multiple planes)  were obtained.     Contrast: 10 cc Gadavist     Findings:  Postsurgical changes of left frontal craniotomy with underlying  resection cavity. Compared to the most recent postsurgical MRI from  9/6/2016, there has been interval decrease in size of the resection  cavity. Stable hemosiderin along the margins of the resection cavity  and over the high left frontal lobe. Slight interval decrease in the  extent of enhancement surrounding the resection cavity. There is again  T2 hyperintense signal in the deep and subcortical white matter of the  left frontal lobe, at the medial, posterior and inferior aspects of  the resection cavity. Unchanged dural thickening and enhancement along  the anterior falx and underlying the craniotomy. No new lesion.     There is 1.1 x 1.0 cm, T2 centrally hyperintense peripherally  hypointense, T1 mostly isointense with central hyperintense portion,  lesion in the left superior cerebellar hemisphere with associated  susceptibility effect on SWI. On postcontrast images, there is mild  internal enhancement. No associated surrounding edema. This lesion is  stable since 7/26/2016 and most likely represents a cavernoma.     There is no midline shift. The ventricles are not enlarged out of  proportion to cerebral sulci. No diffusion restriction. Patent major  intracranial flow-voids.     Small left  sphenoid sinus mucous retention cyst. The mastoid air cells  are clear. The orbits are unremarkable.                                                                       Impression:  1. Evolving postsurgical changes of left frontal mass resection. No  tumor recurrence or new lesion.  2. 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     I have personally reviewed the examination and initial interpretation  and I agree with the findings.     WILMER STONE MD    MR LUMBAR SPINE W/O & W CONTRAST, MR THORACIC SPINE W/O &  W CONTRAST 1/3/2017 7:37 PM     History: NSCLC, bone mets, acute lower back pain, no neurological sxs,  evaluate for cord compression, intramedullary met or compression fx,  Malignant neoplasm of unspecified part of unspecified bronchus or  lung, Low back pain     Comparison: PET/CT 12/20/2016 outside lumbar MR 10/18/2016     Technique:     Thoracic spine: Sagittal T1-weighted, sagittal T2-weighted, sagittal  STIR, sagittal diffusion weighted, axial T1-weighted, and axial  T2-weighted images of the thoracic spine were obtained without the  administration of intravenous contrast. After the administration of  intravenous contrast, fat saturated axial, and sagittal T1-weighted  images of the thoracic spine were obtained.     Lumbar spine: Sagittal T1-weighted, sagittal T2-weighted, sagittal  STIR, sagittal diffusion-weighted, axial T1-weighted, and axial  T2-weighted images of the lumbar spine were obtained without the  administration of intravenous contrast. After the administration of  intravenous contrast, axial and sagittal fat-saturated T1-weighted  images of the lumbar spine were obtained.     Findings:     The thoracic vertebrae are in normal alignment.        There is diffuse metastatic involvement of the all thoracic vertebral  bodies, partially visualized sternum and multiple ribs, as well as  multiple cervical vertebrae. There is also involvement of  the spinous  processes of T2 and T3 and the right transverse process of T10. There  is ventral epidural and right foraminal extension of the mass at T2-3  with resultant mild spinal canal and moderate right neural foraminal  narrowing. There is effacement of the ventral subarachnoid space by  this mass with abutment of the spinal cord. No abnormal cord signal.  Mild superior endplate pathologic compression fracture of T3.     There is paraspinous extension of the masses at T3 and T4 on both  sides, T7 on the right, and T8 on the right. At T8, the mass seems to  involve the parietal pleura.       There is a 2.8 x 2.1 cm mass at T1-2 adjacent to the left second rib.  There is 4 x 3.2 cm mass in the right adrenal gland. Bilateral hilar  lymphadenopathy and partially visualized 2.7 cm mass in the left lung,  appear grossly unchanged from the 12/23/2016 dated PET/CT.     Multilevel Schmorl's node-like endplate deformities. Mild disc height  narrowing at T3-4 and T4-5. Right central disc protrusion with  effacement of the ventral subarachnoid space and flattening of the  cord at T4-5.     Lumbar:    There are 5 lumbar-type vertebrae.       There are increased diffuse metastatic lesions throughout the lumbar  and sacral vertebral bodies and bilateral iliac bones since  10/18/2016. There is also involvement of the right inferior articular  process of L4. There is no spinal canal or neural foraminal extension  of metastasis. However, there is paraspinous extension at L1-L3,  greatest on the left. No pathologic compression fracture.     The tip of the conus medullaris is at upper L1.  The lumbar vertebral  column appears normally aligned.  There is mild disc height narrowing  and varying degrees of disc dehydration from L3-S1.       On a level by level basis:     L1-2: Left central/subarticular disc protrusion. Bilateral facet  hypertrophy. No spinal canal or neural foraminal stenosis.     L2-3: Right asymmetric disc bulge  and bilateral facet hypertrophy.  Mild spinal canal narrowing. Mild right neural foraminal narrowing. No  left neural foraminal stenosis.     L3-4: Circumferential disc bulge and bilateral facet hypertrophy.  Effacement of the lateral recesses with possible abutment of the  traversing bilateral L4 nerve roots. Mild bilateral neural foraminal  narrowing. Mild spinal canal narrowing.     L4-5: Disc bulge and superimposed central protrusion. Bilateral facet  hypertrophy. Mild spinal canal and mild bilateral neural foraminal  narrowing.     L5-S1: Disc bulge and superimposed right central protrusion abuts the  traversing right S1 nerve root at the lateral recess. Mild right  neural foraminal narrowing. No spinal canal or left neural foraminal  stenosis.                                                                       Impression:    1. Diffuse metastatic involvement of the cervical and thoracic  vertebrae, multiple ribs, and sternum, grossly unchanged since the  12/20/2016 dated PET/CT accounting for the differences in technique.  Ventral epidural and neural foraminal extension of the metastasis at  T2-3 results in mild spinal canal and moderate right neural foraminal  stenosis. There is also abutment of the cord without abnormal cord  signal.    2. Mild pathologic compression fracture of the T3 upper vertebral  body, unchanged from 12/23/2016.  3. Partially visualized bilateral hilar lymphadenopathy, left lung and  right adrenal gland metastases, also stable since 12/23/2016.  4. Diffuse metastatic involvement of the lumbosacral vertebrae and  bilateral iliac bones, increased from the 10/18/2016 dated MRI. No  lumbar epidural, spinal canal or neural foraminal extension. No lumbar  pathologic compression fracture.  5. Multilevel lumbar degenerative changes with mild spinal canal  narrowing from L2-3 through L4-5. Mild bilateral neural foraminal  narrowing at L3-4, L4-5 and mild right neural foraminal narrowing  at  L5-S1.       I have personally reviewed the examination and initial interpretation  and I agree with the findings.     WILMER STONE MD    MR LUMBAR SPINE W/O & W CONTRAST, MR THORACIC SPINE W/O &  W CONTRAST 1/3/2017 7:37 PM     History: NSCLC, bone mets, acute lower back pain, no neurological sxs,  evaluate for cord compression, intramedullary met or compression fx,  Malignant neoplasm of unspecified part of unspecified bronchus or  lung, Low back pain     Comparison: PET/CT 12/20/2016 outside lumbar MR 10/18/2016     Technique:     Thoracic spine: Sagittal T1-weighted, sagittal T2-weighted, sagittal  STIR, sagittal diffusion weighted, axial T1-weighted, and axial  T2-weighted images of the thoracic spine were obtained without the  administration of intravenous contrast. After the administration of  intravenous contrast, fat saturated axial, and sagittal T1-weighted  images of the thoracic spine were obtained.     Lumbar spine: Sagittal T1-weighted, sagittal T2-weighted, sagittal  STIR, sagittal diffusion-weighted, axial T1-weighted, and axial  T2-weighted images of the lumbar spine were obtained without the  administration of intravenous contrast. After the administration of  intravenous contrast, axial and sagittal fat-saturated T1-weighted  images of the lumbar spine were obtained.     Findings:     The thoracic vertebrae are in normal alignment.        There is diffuse metastatic involvement of the all thoracic vertebral  bodies, partially visualized sternum and multiple ribs, as well as  multiple cervical vertebrae. There is also involvement of the spinous  processes of T2 and T3 and the right transverse process of T10. There  is ventral epidural and right foraminal extension of the mass at T2-3  with resultant mild spinal canal and moderate right neural foraminal  narrowing. There is effacement of the ventral subarachnoid space by  this mass with abutment of the spinal cord. No abnormal cord  signal.  Mild superior endplate pathologic compression fracture of T3.     There is paraspinous extension of the masses at T3 and T4 on both  sides, T7 on the right, and T8 on the right. At T8, the mass seems to  involve the parietal pleura.       There is a 2.8 x 2.1 cm mass at T1-2 adjacent to the left second rib.  There is 4 x 3.2 cm mass in the right adrenal gland. Bilateral hilar  lymphadenopathy and partially visualized 2.7 cm mass in the left lung,  appear grossly unchanged from the 12/23/2016 dated PET/CT.     Multilevel Schmorl's node-like endplate deformities. Mild disc height  narrowing at T3-4 and T4-5. Right central disc protrusion with  effacement of the ventral subarachnoid space and flattening of the  cord at T4-5.     Lumbar:    There are 5 lumbar-type vertebrae.       There are increased diffuse metastatic lesions throughout the lumbar  and sacral vertebral bodies and bilateral iliac bones since  10/18/2016. There is also involvement of the right inferior articular  process of L4. There is no spinal canal or neural foraminal extension  of metastasis. However, there is paraspinous extension at L1-L3,  greatest on the left. No pathologic compression fracture.     The tip of the conus medullaris is at upper L1.  The lumbar vertebral  column appears normally aligned.  There is mild disc height narrowing  and varying degrees of disc dehydration from L3-S1.       On a level by level basis:     L1-2: Left central/subarticular disc protrusion. Bilateral facet  hypertrophy. No spinal canal or neural foraminal stenosis.     L2-3: Right asymmetric disc bulge and bilateral facet hypertrophy.  Mild spinal canal narrowing. Mild right neural foraminal narrowing. No  left neural foraminal stenosis.     L3-4: Circumferential disc bulge and bilateral facet hypertrophy.  Effacement of the lateral recesses with possible abutment of the  traversing bilateral L4 nerve roots. Mild bilateral neural foraminal  narrowing.  Mild spinal canal narrowing.     L4-5: Disc bulge and superimposed central protrusion. Bilateral facet  hypertrophy. Mild spinal canal and mild bilateral neural foraminal  narrowing.     L5-S1: Disc bulge and superimposed right central protrusion abuts the  traversing right S1 nerve root at the lateral recess. Mild right  neural foraminal narrowing. No spinal canal or left neural foraminal  stenosis.                                                                       Impression:    1. Diffuse metastatic involvement of the cervical and thoracic  vertebrae, multiple ribs, and sternum, grossly unchanged since the  12/20/2016 dated PET/CT accounting for the differences in technique.  Ventral epidural and neural foraminal extension of the metastasis at  T2-3 results in mild spinal canal and moderate right neural foraminal  stenosis. There is also abutment of the cord without abnormal cord  signal.    2. Mild pathologic compression fracture of the T3 upper vertebral  body, unchanged from 12/23/2016.  3. Partially visualized bilateral hilar lymphadenopathy, left lung and  right adrenal gland metastases, also stable since 12/23/2016.  4. Diffuse metastatic involvement of the lumbosacral vertebrae and  bilateral iliac bones, increased from the 10/18/2016 dated MRI. No  lumbar epidural, spinal canal or neural foraminal extension. No lumbar  pathologic compression fracture.  5. Multilevel lumbar degenerative changes with mild spinal canal  narrowing from L2-3 through L4-5. Mild bilateral neural foraminal  narrowing at L3-4, L4-5 and mild right neural foraminal narrowing at  L5-S1.       I have personally reviewed the examination and initial interpretation  and I agree with the findings.     WILMER STONE MD    Impression/plan:  1. 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 Has had improved tolerance to the study drug with less  edema/rash. Appetite is improving and pain is under better control  -Will proceed with cycle 2, D8 today, has weekly f/u while on the study.  -Repeat PET/CT imaging in 4 weeks per protocol    2. Drug rash: none at present. Can use benadryl, claritin, tylenol premedication prior to ALT-803, but hasn't needed to use that with the last few doses.    3. Back pain-has diffuse metastatic disease. Concerned about T2-3 lesion causing future complications. Low back pain and hip pain likely related to L spine and possibly pubic symphysis metastases  -Seeing Rad/Onc for palliative radiation tomorrow  -Pain is better controlled now with Fentanyl 25 mcg every 72 hours and oxycodone 5 mg every 4 hours as needed (refill given today). Using tylenol/ibuprofen in alternating doses  -OK per protocol to take up to 2400 mg ibuprofen/day. No steroids. OK to take 1000 mg tylenol every 6 hours.4. Bone mets: on Zometa, due today. He hasn't been taking Calcium +D. Asked him to start doing that now.    4. Brain metastasis, s/p resection, stereotactic radiation on 9/16/16: stable on MRI last week.     5. Constipation: Opioid related. OK to increase senna to 2 bid, up to 8tabs/daily as needed. OK to uses MOM or miralax as well.            Research RN met with subject for C2W2D1 visit.  Pain in back and hips is improved rating 2/10 and mobility is better with current pain meds to include oxycodone, flexeril, fentenyl patch, tylenol and ibuprofen.  Subject states appetite is improved and a slight increase in constipation with the increased use of narcotics.  SEEMA advised subject to increase sennakot from 2 tabs daily to 2 tabs BID and able to go up to 8.      Subject has appointment for palliative radiation tomorrow 1/11/2017.  Protocol does not state this is contraindicated; however, RN sent email requesting confirmation from sponsor.        Again, thank you for allowing me to participate in the care of your patient.      Sincerely,    Yulia JARQUIN  DANIA Lepe CNP

## 2017-01-10 NOTE — MR AVS SNAPSHOT
After Visit Summary   1/10/2017    Naren Kimble    MRN: [id number]           Patient Information     Date Of Birth          1975        Visit Information        Provider Department      1/10/2017 2:00 PM  14 ATC;  ONCOLOGY INFUSION [facility]        Today's Diagnoses     Non-small cell lung cancer (NSCLC) (H)    -  1       Care Instructions    Contact Numbers  [facility] Nurse Triage: [phone]  After Hours Nurse Line:  [phone]  Hospital Main Line:  [phone]    Please call the [facility] Triage line if you experience a temperature greater than or equal to 100.5, shaking chills, have uncontrolled nausea, vomiting and/or diarrhea, dizziness, shortness of breath, chest pain, bleeding, unexplained bruising, or if you have any other new/concerning symptoms, questions or concerns.     If it is after hours, weekends, or holidays, please call either the after hours nurse line listed above or the main hospital  at  [phone] and ask to speak to the Oncology doctor on call.     If you are having any concerning symptoms or wish to speak to a provider before your next infusion visit, please call your care coordinator or triage to notify them so we can adequately serve you.     If you need a refill on a narcotic prescription or other medication, please call triage before your infusion appointment.         January 2017 Sunday Monday Tuesday Wednesday Thursday Friday Saturday   1     2     3     [facility] LAB DRAW    1:30 PM   (15 min.)   [facility]   [facility] Lab Draw     [facility] RETURN    2:00 PM   (50 min.)   Yulia Lepe APRN CNP   [facility] ONC INFUSION 120    3:00 PM   (120 min.)    ONCOLOGY INFUSION   [facility]     MR BRAIN W    6:00 PM   (60 min.)   UUMR2   [facility], [facility]     MR THORACIC SPINE WWO    7:00 PM   (60 min.)   UR2   [facility], [facility]     MR LUMBAR SPINE  WWO    8:00 PM   (60 min.)   UUMR2   [facility] [place], MRI 4     5     6     [facility] DRAW    9:15 AM   (15 min.)   [facility]   [facility] Draw 7       8     9     10     [facility] DRAW   10:45 AM   (15 min.)   [facility]   [facility] Lab Draw     [facility] RETURN   11:20 AM   (50 min.)   Yulia Lepe APRN CNP   [facility]    2:00 PM   (120 min.)   [facility] ONCOLOGY INFUSION   [facility] 11     [facility] CONSULT    2:00 PM   (90 min.)   Eden Kovacs MD   Radiation Oncology Clinic     [facility] TCT/SIM SUITE    3:00 PM   (60 min.)   Eden Kovacs MD   Radiation Oncology Clinic 12     13     14       15     16     17     [facility] DRAW    9:45 AM   (15 min.)   [facility]   [facility] Draw     [facility] RETURN   10:00 AM   (30 min.)   Nenita Bartholomew MD   [facility]    1:30 PM   (120 min.)    ONCOLOGY INFUSION   [facility] 18     19     20     21       22     23     24     [facility] DRAW    1:30 PM   (15 min.)   [facility] D[facility]   [facility] Lab Draw     [facility] RETURN    2:00 PM   (50 min.)   Yulia Lepe APRN CNP   [facility] 120    3:00 PM   (120 min.)    ONCOLOGY INFUSION   [facility] 25     26     27     28       29     30     31     [facility] DRAW    9:45 AM   (15 min.)   [facility]     [facility] RETURN   10:30 AM   (50 min.)   Yulia Lepe APRN CNP   [facility] 120   12:00 PM   (120 min.)    ONCOLOGY INFUSION   [facility]                                February 2017 Sunday Monday Tuesday Wednesday Thursday Friday Saturday                  1     2     3     4       5     6     PET ONCOLOGY WHOLE BODY    7:30 AM   (45 min.)   UUPET1   [facility]  [place] PET CT 7     [facility]    4:00 PM   (15 min.)    [facility] DRAW   [facility] Lab Draw     P RETURN    4:30 PM   (30 min.)   Khris Randle DO M [facility] 8     9     10     11       12     13     14     15     16     17     18       19     20     21     22     23  Happy Birthday!     24     25       26     27     28                                      Lab Results:  Recent Results (from the past 12 hour(s))   CBC with platelets differential    Collection Time: 01/10/17 10:35 AM   Result Value Ref Range    WBC 7.9 4.0 - 11.0 10e9/L    RBC Count 3.36 (L) 4.4 - 5.9 10e12/L    Hemoglobin 9.1 (L) 13.3 - 17.7 g/dL    Hematocrit 29.2 (L) 40.0 - 53.0 %    MCV 87 78 - 100 fl    MCH 27.1 26.5 - 33.0 pg    MCHC 31.2 (L) 31.5 - 36.5 g/dL    RDW 14.0 10.0 - 15.0 %    Platelet Count 374 150 - 450 10e9/L    Diff Method Automated Method     % Neutrophils 60.1 %    % Lymphocytes 25.1 %    % Monocytes 10.2 %    % Eosinophils 3.7 %    % Basophils 0.5 %    % Immature Granulocytes 0.4 %    Nucleated RBCs 0 0 /100    Absolute Neutrophil 4.7 1.6 - 8.3 10e9/L    Absolute Lymphocytes 2.0 0.8 - 5.3 10e9/L    Absolute Monocytes 0.8 0.0 - 1.3 10e9/L    Absolute Eosinophils 0.3 0.0 - 0.7 10e9/L    Absolute Basophils 0.0 0.0 - 0.2 10e9/L    Abs Immature Granulocytes 0.0 0 - 0.4 10e9/L    Absolute Nucleated RBC 0.0    Comprehensive metabolic panel    Collection Time: 01/10/17 10:35 AM   Result Value Ref Range    Sodium 136 133 - 144 mmol/L    Potassium 4.8 3.4 - 5.3 mmol/L    Chloride 104 94 - 109 mmol/L    Carbon Dioxide 26 20 - 32 mmol/L    Anion Gap 6 3 - 14 mmol/L    Glucose 85 70 - 99 mg/dL    Urea Nitrogen 16 7 - 30 mg/dL    Creatinine 0.74 0.66 - 1.25 mg/dL    GFR Estimate >90  Non  GFR Calc   >60 mL/min/1.7m2    GFR Estimate If Black >90   GFR Calc   >60 mL/min/1.7m2    Calcium 9.2 8.5 - 10.1 mg/dL    Bilirubin Total 0.3 0.2 - 1.3 mg/dL    Albumin  3.1 (L) 3.4 - 5.0 g/dL    Protein Total 9.2 (H) 6.8 - 8.8 g/dL    Alkaline Phosphatase 60 40 - 150 U/L    ALT 19 0 - 70 U/L    AST 22 0 - 45 U/L   Magnesium    Collection Time: 01/10/17 10:35 AM   Result Value Ref Range    Magnesium 2.0 1.6 - 2.3 mg/dL   Phosphorus    Collection Time: 01/10/17 10:35 AM   Result Value Ref Range    Phosphorus 3.4 2.5 - 4.5 mg/dL   Lactate Dehydrogenase    Collection Time: 01/10/17 10:35 AM   Result Value Ref Range    Lactate Dehydrogenase 391 (H) 85 - 227 U/L               Follow-ups after your visit        Your next 10 appointments already scheduled     Jan 11, 2017  2:00 PM   CONSULT with Eden Kovacs MD   Radiation Oncology Clinic ([facility])    [facility]  1st Floor  [address] [address]   [phone]            Jan 11, 2017  3:00 PM   TCT/SIM Suite Visit with Eden Kovacs MD   Radiation Oncology Clinic ([facility])    [facility]  1st Floor  [address] [address]   [phone]            Jan 17, 2017  9:45 AM   [facility] Lab Draw with  [facility] LAB DRAW   [facility] ([facility])    [address] [place] [address]   [phone]            Jan 17, 2017 10:00 AM   (Arrive by 9:45 AM)   Return Visit with Nenita Bartholomew MD   [facility])    [address] [place] [address]  2nd [facility] [address]   [phone]            Jan 17, 2017  1:30 PM   Infusion 120 with UC ONCOLOGY INFUSION, UC 22 ATC   [facility] ([facility])    [address] [place] [address]  2nd [facility] [address]   [phone]            Jan 24, 2017  1:30 PM   [facility] Draw with [facility] DRAW   [facility] ([facility])    [address] [place] [address]  [id number] [phone]            Jan 24, 2017  2:00 PM   (Arrive by 1:45 PM)   Return Visit with DANIA Stallworth CNP   [facility] ([facility])    [address] [place] [address] [phone]            Jan 24, 2017  3:00 PM   Infusion 120 with UC ONCOLOGY INFUSION, UC 23 ATC   [facility] ([facility])    [address] [place] [address] [phone]              Who to contact     If you have questions or need follow up information about today's clinic visit or your schedule please contact [facility] directly at [phone].  Normal or non-critical lab and imaging results will be communicated to you by [employer], letter or phone within 4 business days after the clinic has received the results. If you do not hear from us within 7 days, please contact the clinic through [employer] or phone. If you have a critical or abnormal lab result, we will notify you by phone as soon as possible.  Submit refill requests through [employer] or call your pharmacy and they will forward the refill request to us. Please allow 3 business days for your refill to be completed.          Additional Information About Your Visit        [employer] Information     [employer] gives you secure access to your electronic health record. If you see a primary care provider, you can also send messages to your care team and make appointments. If you have questions, please call your primary care clinic.  If you do not have a primary care provider, please call [phone] and they will assist you.        Care EveryWhere ID     This is your Care EveryWhere ID. This could be used by other organizations to access your [place] medical records  [id number]        Your Vitals Were     Pulse Temperature Respirations Pulse Oximetry          103 97.9  F (36.6  C) (Tympanic) 16 100%         Blood  Pressure from Last 3 Encounters:   01/10/17 136/83   01/10/17 125/85   01/03/17 116/82    Weight from Last 3 Encounters:   01/10/17 115.486 kg (254 lb 9.6 oz)   01/03/17 116.302 kg (256 lb 6.4 oz)   12/27/16 117.4 kg (258 lb 13.1 oz)              We Performed the Following     CBC with platelets differential     Comprehensive metabolic panel     Lactate Dehydrogenase     Magnesium     Phosphorus          Where to get your medicines      Some of these will need a paper prescription and others can be bought over the counter.  Ask your nurse if you have questions.     Bring a paper prescription for each of these medications    - fentaNYL 25 mcg/hr 72 hr patch  - oxyCODONE 5 MG capsule       Primary Care Provider    None Specified       No primary provider on file.        Thank you!     Thank you for choosing [facility] CANCER CLINIC  for your care. Our goal is always to provide you with excellent care. Hearing back from our patients is one way we can continue to improve our services. Please take a few minutes to complete the written survey that you may receive in the mail after your visit with us. Thank you!             Your Updated Medication List - Protect others around you: Learn how to safely use, store and throw away your medicines at www.disposemymeds.org.          This list is accurate as of: 1/10/17  2:13 PM.  Always use your most recent med list.                   Brand Name Dispense Instructions for use    CYCLOBENZAPRINE HCL PO      Take 10 mg by mouth 3 times daily as needed       diphenhydrAMINE 25 MG tablet    BENADRYL    50 tablet    Take 1-2 tablets (25-50 mg) by mouth every 6 hours as needed for itching or allergies       famotidine 10 MG tablet    PEPCID    20 tablet    Take 2 tablets (20 mg) by mouth 2 times daily       fentaNYL 25 mcg/hr 72 hr patch    DURAGESIC    10 patch    Place 1 patch onto the skin every 72 hours       FOLIC ACID PO      Take 800 mcg by mouth daily       levETIRAcetam  100 MG/ML solution    KEPPRA     Take 10 mg/kg by mouth 2 times daily       lidocaine 5 % Patch    LIDODERM    30 patch    Apply up to 3 patches to painful area at once for up to 12 h within a 24 h period.  Remove after 12 hours.       loratadine 10 MG tablet    CLARITIN    30 tablet    Take 1 tablet (10 mg) by mouth daily       ondansetron 8 MG tablet    ZOFRAN    9 tablet    Take 1 tablet (8 mg) by mouth every 8 hours as needed       oxyCODONE 5 MG capsule    OXY-IR    100 capsule    Take 1 capsule (5 mg) by mouth every 4 hours as needed for moderate to severe pain       PROCHLORPERAZINE MALEATE PO      Take 10 mg by mouth every 6 hours as needed for nausea or vomiting       TYLENOL PO      Take 500 mg by mouth daily as needed

## 2017-01-10 NOTE — TELEPHONE ENCOUNTER
Short term diasbility form completed. Pt was given the original at his visit today. Copy sent for scanning.

## 2017-01-10 NOTE — PROGRESS NOTES
Research RN met with subject for C2W2D1 visit.  Pain in back and hips is improved rating 2/10 and mobility is better with current pain meds to include oxycodone, flexeril, fentenyl patch, tylenol and ibuprofen.  Subject states appetite is improved and a slight increase in constipation with the increased use of narcotics.  SEEMA advised subject to increase sennakot from 2 tabs daily to 2 tabs BID and able to go up to 8.      Subject has appointment for palliative radiation tomorrow 1/11/2017.  Protocol does not state this is contraindicated; however, RN sent email requesting confirmation from sponsor.

## 2017-01-10 NOTE — MR AVS SNAPSHOT
After Visit Summary   1/10/2017    Naren Kimble    MRN: [id number]           Patient Information     Date Of Birth          1975        Visit Information        Provider Department      1/10/2017 11:20 AM Yulia Lepe APRN CNP [facility]        Today's Diagnoses     Left-sided low back pain without sciatica, unspecified chronicity    -  1     Non-small cell lung cancer (NSCLC) (H)         Brain metastasis (H)            Follow-ups after your visit        Your next 10 appointments already scheduled     Raymundo 10, 2017  2:00 PM   Infusion 120 with UC ONCOLOGY INFUSION, UC 14 ATC   [facility] ([facility])    [address] [place] [address]  2nd [facility] [address]   [phone]            Jan 11, 2017  2:00 PM   CONSULT with Eden Kovacs MD   Radiation Oncology Clinic ([facility])    [facility]  1st Floor  [address] [facility] [address]   [phone]            Jan 11, 2017  3:00 PM   TCT/SIM Suite Visit with Eden Kovacs MD   Radiation Oncology Clinic ([facility])    [facility]  1st Floor  [address] [address]   [phone]            Jan 17, 2017  9:45 AM   [facility] Lab Draw with  [facility] LAB DRAW   [facility] Lab Draw ([facility])    [address] [place] [address]  [address]nd [facility] [address]   [phone]            Jan 17, 2017 10:00 AM   (Arrive by 9:45 AM)   Return Visit with Nenita Bartholomew MD   [facility] ([facility])    [address] [place] [address]  [address]nd [facility] [address]   [phone]            Jan 17, 2017  1:30 PM   Infusion 120 with UC ONCOLOGY INFUSION, UC 22 ATC   [facility] ([facility])    [address] [place] [address]   [phone]             Jan 24, 2017  1:30 PM   [facility] Lab Draw with  [facility] LAB DRAW   [facility] Lab Draw ([facility])    [address]  2nd Floor  [facility] [address] [phone]            Jan 24, 2017  2:00 PM   (Arrive by 1:45 PM)   Return Visit with DANIA Stallworth CNP   [facility] ([facility])    [address]  2nd [facility] [address] [phone]              Who to contact     If you have questions or need follow up information about today's clinic visit or your schedule please contact [facility] directly at [phone].  Normal or non-critical lab and imaging results will be communicated to you by [employer], letter or phone within 4 business days after the clinic has received the results. If you do not hear from us within 7 days, please contact the clinic through [employer] or phone. If you have a critical or abnormal lab result, we will notify you by phone as soon as possible.  Submit refill requests through [employer] or call your pharmacy and they will forward the refill request to us. Please allow 3 business days for your refill to be completed.          Additional Information About Your Visit        [employer] Information     [employer] gives you secure access to your electronic health record. If you see a primary care provider, you can also send messages to your care team and make appointments. If you have questions, please call your primary care clinic.  If you do not have a primary care provider, please call [phone] and they will assist you.        Care EveryWhere ID     This is your Care EveryWhere ID. This could be used by other organizations to access your [place] medical records  [id number]        Your Vitals Were     Pulse Temperature Respirations Pulse Oximetry          91 97.4  F (36.3  C) (Tympanic) 18 99%         Blood Pressure from Last 3 Encounters:   01/10/17 125/85    01/03/17 116/82   01/03/17 126/88    Weight from Last 3 Encounters:   01/10/17 115.486 kg (254 lb 9.6 oz)   01/03/17 116.302 kg (256 lb 6.4 oz)   12/27/16 117.4 kg (258 lb 13.1 oz)              Today, you had the following     No orders found for display         Where to get your medicines      Some of these will need a paper prescription and others can be bought over the counter.  Ask your nurse if you have questions.     Bring a paper prescription for each of these medications    - fentaNYL 25 mcg/hr 72 hr patch  - oxyCODONE 5 MG capsule       Primary Care Provider    None Specified       No primary provider on file.        Thank you!     Thank you for choosing [facility] CANCER CLINIC  for your care. Our goal is always to provide you with excellent care. Hearing back from our patients is one way we can continue to improve our services. Please take a few minutes to complete the written survey that you may receive in the mail after your visit with us. Thank you!             Your Updated Medication List - Protect others around you: Learn how to safely use, store and throw away your medicines at www.disposemymeds.org.          This list is accurate as of: 1/10/17 12:14 PM.  Always use your most recent med list.                   Brand Name Dispense Instructions for use    CYCLOBENZAPRINE HCL PO      Take 10 mg by mouth 3 times daily as needed       diphenhydrAMINE 25 MG tablet    BENADRYL    50 tablet    Take 1-2 tablets (25-50 mg) by mouth every 6 hours as needed for itching or allergies       famotidine 10 MG tablet    PEPCID    20 tablet    Take 2 tablets (20 mg) by mouth 2 times daily       fentaNYL 25 mcg/hr 72 hr patch    DURAGESIC    10 patch    Place 1 patch onto the skin every 72 hours       FOLIC ACID PO      Take 800 mcg by mouth daily       levETIRAcetam 100 MG/ML solution    KEPPRA     Take 10 mg/kg by mouth 2 times daily       lidocaine 5 % Patch    LIDODERM    30 patch    Apply up to 3 patches  to painful area at once for up to 12 h within a 24 h period.  Remove after 12 hours.       loratadine 10 MG tablet    CLARITIN    30 tablet    Take 1 tablet (10 mg) by mouth daily       ondansetron 8 MG tablet    ZOFRAN    9 tablet    Take 1 tablet (8 mg) by mouth every 8 hours as needed       oxyCODONE 5 MG capsule    OXY-IR    100 capsule    Take 1 capsule (5 mg) by mouth every 4 hours as needed for moderate to severe pain       PROCHLORPERAZINE MALEATE PO      Take 10 mg by mouth every 6 hours as needed for nausea or vomiting       TYLENOL PO      Take 500 mg by mouth daily as needed

## 2017-01-11 NOTE — Clinical Note
1/11/2017       RE: Naren Kimble  [address]  [facility] [place] [id number]     Dear Colleague,    Thank you for referring your patient, Naren Kimble, to the [facility]. Please see a copy of my visit note below.      HPI    INITIAL PATIENT ASSESSMENT    Diagnosis: met lung cnacer    Prior radiation therapy:   Site Treated: brain  Facility: [facility]  Dates: 9/2016  Dose:     Prior chemotherapy:   Protocol: cisplat/alimta  Facility: [facility]  Dates: 10/31/16      Prior hormonal therapy:No    Pain Eval:  Current history of pain associated with this visit:   Intensity: 3/10  Current: dull  Location: L hip and low back  Treatment: fentanyl patch and oxycodone    Psychosocial  Living arrangements: wife and 2 children  Fall Risk: independent   referral needs: Not needed    Advanced Directive: Yes - Location: chart  Implantable Cardiac Device? No    Onset of menarche:   LMP: No LMP for male patient.  Onset of menopause:   Abnormal vaginal bleeding/discharge:   Are you pregnant? No  Reproductive note: 52 children    Review of Systems   Constitutional: Positive for malaise/fatigue. Negative for fever, chills and weight loss.   HENT: Negative for congestion, hearing loss, nosebleeds, sore throat and tinnitus.    Eyes: Negative for blurred vision.   Respiratory: Negative for cough, shortness of breath and wheezing.    Cardiovascular: Negative for chest pain and leg swelling.   Gastrointestinal: Positive for heartburn and constipation. Negative for nausea, vomiting, abdominal pain and diarrhea.   Genitourinary: Negative for dysuria, urgency and frequency.   Musculoskeletal: Positive for back pain and neck pain.   Skin: Negative for itching and rash.   Neurological: Negative for dizziness, tingling, seizures and headaches.   Endo/Heme/Allergies: Negative for environmental allergies.   Psychiatric/Behavioral: Negative for depression. The patient is not nervous/anxious.                  RADIATION  ONCOLOGY CONSULT NOTE  Date of Visit: 2017    Naren Kimble  MRN: [id number]  : 1975    Naren Kimble is being seen today for initial consultation at the request of Dr. Khris Randle for consideration of palliative radiation therapy for osseous metastatic lesions of stage IV non-small cell lung cancer. All pertinent labs, imaging, and pathology findings have been reviewed.     HISTORY OF PRESENT ILLNESS:  Mr. Kimble is a 41 year old gentleman with a history of non-small cell lung cancer. He initially presented in 2016 with a brain mass, which was excised and consistent with metastatic adenocarcinoma of the lung. CT Chest on 2016 was negative for pulmonary mass, and EGFR and ALK studies were negative for targetable mutations.  He underwent postoperative stereotactic fractionated radiotherapy to the tumor bed by Dr. Hartmann, from 16-16; the tumor bed received 3000 cGy in 5 fractions.     He underwent bronchoscopy with FNA biopsies and right lower lobe biopsy on 2016 at [facility]. FNA samples were non-diagnostic, and no malignant cells were seen in the pulmonary biopsy. He then underwent systemic treatment with cisplatin and Alimta for a planned 6 cycles starting 2016. Unfortunately, after the second cycle he was found to have osseous metastatic disease on PET/CT 11/10/2016, with innumerable FDG avid lytic metastases in the axial and appendicular skeleton, including lesions in the left glenoid, T5 vertebral body, and the left ischium at the pubic symphysis. He was also noted to have a new right-sided FFDG avid adrenal lesion measuring 1.8cm and increase in the left major fissure pulmonary lesion. Zometa was initiated at that time, and he received a third cycle of cisplatin/alimta.    He then enrolled in a clinical trial with [id number] (an IL-15 super agonist) with Nivolumab, with his first treatment received 2016. He developed a significant rash, most notably on  the upper and lower extremities, which has slowly improved since starting the investigational therapy. More recently, he met with Yulia Lepe yesterday prior to his c2 d8 infusion, and noted persistent pain in his back and groin. His medical pain management is being optimized, and he has transitioned to fentanyl patches from oxycontin; he continues to use oxycodone PRN for pain, but has only used it once in the last 4 days. He endorses 4/10 pain, primarily centered in the low back but with some radiation to the left. He also notes occasional right hip discomfort. Neither of these pains are reproducible with any specific activity, but he reports the pains can be exacerbated if he sleeps in the wrong position and that the pain is sometimes alleviated with motion and walking.    CHEMOTHERAPY HISTORY: As per HPI    PAST RADIATION THERAPY HISTORY: As per HPI, fractionated radiosurgery to the intracranial tumor bed, 3000 cGy in 5 fractions, by Dr. Hartmann at [facility].    PAST MEDICAL HISTORY:  1. Lung cancer as per HPI  2. Obstructive sleep apnea    PAST SURGICAL HISTORY:  1. Left frontal craniotomy as per HPI  2. Bronchoscopy with biopsies 8/8/16 as per HPI.    ALLERGIES:  No known drug allergies.    MEDICATIONS:  1. Fentanyl patch 25 mcg/hr  2. Oxycodone 5mg PRN  3. Lidoderm patch  4. Benadryl  5. Claritin  6. Acetaminophen PRN  7. Zofran PRN  8. Keppra  9. Flexeril  10. Prochlorperazine  11. Folic Acid    FAMILY HISTORY:  He has no pertinent oncologic family history. No known oncologic syndromes or history of lung cancer.    SOCIAL HISTORY:  Valerio is , he is a lifelong never smoker, and drinks alcohol rarely.    REVIEW OF SYSTEMS: A 10 point review of systems was obtained. Pertinent findings are noted in the HPI and are otherwise unremarkable.     PHYSICAL EXAM:  VITALS: /84 mmHg  Pulse 98  GEN: Appears well, alert, oriented, and in NAD  HEENT: EOMI, normal conjunctiva, MMM, no thrush  NECK:  Supple, full ROM, no cervical or clavicular lymphadenopathy  CV: Normal rate, regular rhythm, no murmurs/rubs/gallops, warm and well-perfused  RESP: CTAB, no wheezes/rales/rhonchi, breathing comfortably on room air  ABDOMEN: Soft, NT, ND, bowel sounds present  MUSCULOSKELETAL: No instability or reproducible pain on vertebral palpation. Bill localizes his pain to the lower lumbar spine and upper sacrum, primarily at midline but also within 3cm to the left of midline.  NEURO: No focal deficits, CN 3-12 grossly intact, normal and symmetric motor and sensory exam in bilateral upper and lower extremities, normal gait  PSYCH: Appropriate mood and affect    ECOG PERFORMANCE STATUS: 1    PACEMAKER: None    IMPRESSION:  Mr. Kimble is a 41 year old gentleman with metastatic non-small cell lung cancer, with diffusely metastatic disease most significantly in the axial and appendicular skeleton. He has painful lesions causing hip and low back pain. He is currently on clinical trial utilizing [id number] (an IL-15 superagonist) and nivolumab, but has recently been noted to have progression by imaging findings.    RECOMMENDATION:  We recommend palliative radiotherapy to the thoracic spine, pelvis and left hip with the goal of local control and palliation of pain, with the added benefit of hopefully preventing continued growth of the lesions and pathologic fracture.    We will tentatively plan to treat these lesions to 2,000 cGy in 5 fractions, starting 1/6/2017.    The rationale, logistics, risks, benefits, and potential side effects of the proposed treatment were reviewed in detail. The patient had many questions during our conversation today, which were answered to the best of our ability.     The patient was seen and discussed with staff, Dr. Kovacs. Thank you for involving us in the care of this patient.  Please feel free to contact us with questions or concerns at any time.    Ahsan Castro MD PGY-3  Radiation Oncology Resident,  [facility]  Phone: [phone]    I saw the patient with the resident.  I agree with the resident's note and plan of care.      TEQUILA Kovacs M.D.  Department of Radiation Oncology  [facility]

## 2017-01-11 NOTE — PROGRESS NOTES
HPI    INITIAL PATIENT ASSESSMENT    Diagnosis: met lung cnacer    Prior radiation therapy:   Site Treated: brain  Facility: [facility]  Dates: 9/2016  Dose:     Prior chemotherapy:   Protocol: cisplat/alimta  Facility: [facility]  Dates: 10/31/16      Prior hormonal therapy:No    Pain Eval:  Current history of pain associated with this visit:   Intensity: 3/10  Current: dull  Location: L hip and low back  Treatment: fentanyl patch and oxycodone    Psychosocial  Living arrangements: wife and 2 children  Fall Risk: independent   referral needs: Not needed    Advanced Directive: Yes - Location: chart  Implantable Cardiac Device? No    Onset of menarche:   LMP: No LMP for male patient.  Onset of menopause:   Abnormal vaginal bleeding/discharge:   Are you pregnant? No  Reproductive note: 52 children    Review of Systems   Constitutional: Positive for malaise/fatigue. Negative for fever, chills and weight loss.   HENT: Negative for congestion, hearing loss, nosebleeds, sore throat and tinnitus.    Eyes: Negative for blurred vision.   Respiratory: Negative for cough, shortness of breath and wheezing.    Cardiovascular: Negative for chest pain and leg swelling.   Gastrointestinal: Positive for heartburn and constipation. Negative for nausea, vomiting, abdominal pain and diarrhea.   Genitourinary: Negative for dysuria, urgency and frequency.   Musculoskeletal: Positive for back pain and neck pain.   Skin: Negative for itching and rash.   Neurological: Negative for dizziness, tingling, seizures and headaches.   Endo/Heme/Allergies: Negative for environmental allergies.   Psychiatric/Behavioral: Negative for depression. The patient is not nervous/anxious.

## 2017-01-11 NOTE — PROGRESS NOTES
Radiation Therapy Patient Education    Person involved with teaching: Patient    Patient educational needs for self management of treatment-related side effects assessment completed.  [facility] Patient Ed tab contains Patient Learning Assessment    Education Materials Given  Radiation Therapy and You    Educational Topics Discussed  Side effects expected, Activity, Nutrition and weight loss and When to call MD/RN    Response To Teaching  Verbalizes understanding    GYN Only  Vaginal Dilator-given and educated: N/A    Referrals sent: None    Chemotherapy?

## 2017-01-12 NOTE — PROGRESS NOTES
RADIATION ONCOLOGY CONSULT NOTE  Date of Visit: 2017    Naren Kimble  MRN: [id number]  : 1975    Naren Kimble is being seen today for initial consultation at the request of Dr. Khris Randle for consideration of palliative radiation therapy for osseous metastatic lesions of stage IV non-small cell lung cancer. All pertinent labs, imaging, and pathology findings have been reviewed.     HISTORY OF PRESENT ILLNESS:  Mr. Kimble is a 41 year old gentleman with a history of non-small cell lung cancer. He initially presented in 2016 with a brain mass, which was excised and consistent with metastatic adenocarcinoma of the lung. CT Chest on 2016 was negative for pulmonary mass, and EGFR and ALK studies were negative for targetable mutations.  He underwent postoperative stereotactic fractionated radiotherapy to the tumor bed by Dr. Hartmann, from 16-16; the tumor bed received 3000 cGy in 5 fractions.     He underwent bronchoscopy with FNA biopsies and right lower lobe biopsy on 2016 at [facility]. FNA samples were non-diagnostic, and no malignant cells were seen in the pulmonary biopsy. He then underwent systemic treatment with cisplatin and Alimta for a planned 6 cycles starting 2016. Unfortunately, after the second cycle he was found to have osseous metastatic disease on PET/CT 11/10/2016, with innumerable FDG avid lytic metastases in the axial and appendicular skeleton, including lesions in the left glenoid, T5 vertebral body, and the left ischium at the pubic symphysis. He was also noted to have a new right-sided FFDG avid adrenal lesion measuring 1.8cm and increase in the left major fissure pulmonary lesion. Zometa was initiated at that time, and he received a third cycle of cisplatin/alimta.    He then enrolled in a clinical trial with [id number] (an IL-15 super agonist) with Nivolumab, with his first treatment received 2016. He developed a significant rash, most  notably on the upper and lower extremities, which has slowly improved since starting the investigational therapy. More recently, he met with Yulia Lepe yesterday prior to his c2 d8 infusion, and noted persistent pain in his back and groin. His medical pain management is being optimized, and he has transitioned to fentanyl patches from oxycontin; he continues to use oxycodone PRN for pain, but has only used it once in the last 4 days. He endorses 4/10 pain, primarily centered in the low back but with some radiation to the left. He also notes occasional right hip discomfort. Neither of these pains are reproducible with any specific activity, but he reports the pains can be exacerbated if he sleeps in the wrong position and that the pain is sometimes alleviated with motion and walking.    CHEMOTHERAPY HISTORY: As per HPI    PAST RADIATION THERAPY HISTORY: As per HPI, fractionated radiosurgery to the intracranial tumor bed, 3000 cGy in 5 fractions, by Dr. Hartmann at [facility].    PAST MEDICAL HISTORY:  1. Lung cancer as per HPI  2. Obstructive sleep apnea    PAST SURGICAL HISTORY:  1. Left frontal craniotomy as per HPI  2. Bronchoscopy with biopsies 8/8/16 as per HPI.    ALLERGIES:  No known drug allergies.    MEDICATIONS:  1. Fentanyl patch 25 mcg/hr  2. Oxycodone 5mg PRN  3. Lidoderm patch  4. Benadryl  5. Claritin  6. Acetaminophen PRN  7. Zofran PRN  8. Keppra  9. Flexeril  10. Prochlorperazine  11. Folic Acid    FAMILY HISTORY:  He has no pertinent oncologic family history. No known oncologic syndromes or history of lung cancer.    SOCIAL HISTORY:  Valerio is , he is a lifelong never smoker, and drinks alcohol rarely.    REVIEW OF SYSTEMS: A 10 point review of systems was obtained. Pertinent findings are noted in the HPI and are otherwise unremarkable.     PHYSICAL EXAM:  VITALS: /84 mmHg  Pulse 98  GEN: Appears well, alert, oriented, and in NAD  HEENT: EOMI, normal conjunctiva, MMM, no  thrush  NECK: Supple, full ROM, no cervical or clavicular lymphadenopathy  CV: Normal rate, regular rhythm, no murmurs/rubs/gallops, warm and well-perfused  RESP: CTAB, no wheezes/rales/rhonchi, breathing comfortably on room air  ABDOMEN: Soft, NT, ND, bowel sounds present  MUSCULOSKELETAL: No instability or reproducible pain on vertebral palpation. Bill localizes his pain to the lower lumbar spine and upper sacrum, primarily at midline but also within 3cm to the left of midline.  NEURO: No focal deficits, CN 3-12 grossly intact, normal and symmetric motor and sensory exam in bilateral upper and lower extremities, normal gait  PSYCH: Appropriate mood and affect    ECOG PERFORMANCE STATUS: 1    PACEMAKER: None    IMPRESSION:  Mr. Kimble is a 41 year old gentleman with metastatic non-small cell lung cancer, with diffusely metastatic disease most significantly in the axial and appendicular skeleton. He has painful lesions causing hip and low back pain. He is currently on clinical trial utilizing [id number] (an IL-15 superagonist) and nivolumab, but has recently been noted to have progression by imaging findings.    RECOMMENDATION:  We recommend palliative radiotherapy to the thoracic spine, pelvis and left hip with the goal of local control and palliation of pain, with the added benefit of hopefully preventing continued growth of the lesions and pathologic fracture.    We will tentatively plan to treat these lesions to 2,000 cGy in 5 fractions, starting 1/6/2017.    The rationale, logistics, risks, benefits, and potential side effects of the proposed treatment were reviewed in detail. The patient had many questions during our conversation today, which were answered to the best of our ability.     The patient was seen and discussed with staff, Dr. Kovacs. Thank you for involving us in the care of this patient.  Please feel free to contact us with questions or concerns at any time.    Ahsan Castro MD PGY-3  Radiation Oncology  Resident, [facility]  Phone: [phone]    I saw the patient with the resident.  I agree with the resident's note and plan of care.      TEQUILA Kovacs M.D.  Department of Radiation Oncology  [facility]

## 2017-01-17 NOTE — PROGRESS NOTES
[facility] PHYSICIANS  HEMATOLOGY ONCOLOGY    ONCOLOGY FOLLOWUP NOTE      DIAGNOSIS:  Stage IV non-small cell lung cancer.  Initially presented in 07/2016 with a brain mass, which was resected and was consistent with metastatic adenocarcinoma of the lung.  Negative EGFR and ALK rearrangement studies.  I do not have information about ROS1.  CT scan in 07/2016 did not have any definitive mass in the lung.  PET/CT 08/2015 showed 1 cm mass in the left lower lobe.  Bronchoscopy was negative with the [facility].  He proceeded with cisplatin and Alimta for 6 cycles after stereotactic radiation to resection bed and started chemotherapy 09/19/2016.  Imaging after 2 cycles of chemotherapy were consistent with metastatic disease in the bone.  He was started on Zometa and has had 3 cycles of adjuvant cisplatin and Alimta.      TREATMENT:  Naren Kimble is enrolling in a clinical trial, [id number] with Nivolumab first treatment 11/23/16.      SUBJECTIVE:  The patient was seen as a followup today. His issues with rash have mostly resolved. He has disease progression with painful metastasis and has started palliative radiation. He has a lumg on anterior/left chest wall which is also site of disease progression, it is not painful.     REVIEW OF SYSTEMS:  A complete review of systems was performed and found to be negative other than pertinent positives mentioned in history of present illness.     Past medical, social histories reviewed.    Meds- Reviewed.     PHYSICAL EXAMINATION:   VITAL SIGNS: /82 mmHg  Pulse 99  Temp(Src) 98.1  F (36.7  C) (Tympanic)  Resp 18  Wt 115.35 kg (254 lb 4.8 oz)  SpO2 98%  GENERAL: Sitting comfortably.   HEENT: Pupils are equal. Oropharynx is clear.   NECK: No cervical or supraclavicular lymphadenopathy.   LUNGS: Clear bilaterally.   HEART: S1, S2, regular.   ABDOMEN: Soft, nontender, nondistended, no hepatosplenomegaly.   EXTREMITIES: Warm, well perfused.    NEUROLOGIC: Alert, awake.   SKIN:No rash  LYMPHATICS: No edema.      LABORATORY DATA:  Recent Labs   Lab Test  01/17/17   0830  01/10/17   1035   NA  134  136   POTASSIUM  4.3  4.8   CHLORIDE  100  104   CO2  26  26   ANIONGAP  7  6   BUN  19  16   CR  0.59*  0.74   GLC  91  85   ROSENDO  8.8  9.2   MAG  2.2  2.0   PHOS  4.1  3.4     Recent Labs   Lab Test  01/17/17   0830  01/10/17   1035  01/03/17   1410   WBC  9.7  7.9  8.6   HGB  9.2*  9.1*  9.4*   PLT  358  374  386   MCV  84  87  89   NEUTROPHIL  74.5  60.1  66.8     Recent Labs   Lab Test  01/17/17   0830  01/10/17   1035  01/03/17   1410   BILITOTAL  0.3  0.3  0.3   ALKPHOS  61  60  61   ALT  18  19  19   AST  21  22  23   ALBUMIN  3.2*  3.1*  3.4   LDH  407*  391*  437*     12/23/16  1. In this patient with a history of metastatic lung cancer, there has  been significant disease progression:  a. Enlarging primary left lower lobe/lingular biopsy-proven lung  adenocarcinoma.  b. New/enlarged metastases in the chest, abdomen, and pelvis as  described above. Additionally, there is a new metastasis in the right  adenoids.  c. New/enlarged osseous metastases involving the axial and  appendicular skeleton. Unchanged pathologic fracture of the T3  superior endplate.     2. Hepatic steatosis.    Results for orders placed or performed during the hospital encounter of 01/03/17   MRI Thoracic spine w & w/o contrast    Narrative    MR LUMBAR SPINE W/O & W CONTRAST, MR THORACIC SPINE W/O &  W CONTRAST 1/3/2017 7:37 PM    History: NSCLC, bone mets, acute lower back pain, no neurological sxs,  evaluate for cord compression, intramedullary met or compression fx,  Malignant neoplasm of unspecified part of unspecified bronchus or  lung, Low back pain    Comparison: PET/CT 12/20/2016 outside lumbar MR 10/18/2016    Technique:    Thoracic spine: Sagittal T1-weighted, sagittal T2-weighted, sagittal  STIR, sagittal diffusion weighted, axial T1-weighted, and axial  T2-weighted  images of the thoracic spine were obtained without the  administration of intravenous contrast. After the administration of  intravenous contrast, fat saturated axial, and sagittal T1-weighted  images of the thoracic spine were obtained.    Lumbar spine: Sagittal T1-weighted, sagittal T2-weighted, sagittal  STIR, sagittal diffusion-weighted, axial T1-weighted, and axial  T2-weighted images of the lumbar spine were obtained without the  administration of intravenous contrast. After the administration of  intravenous contrast, axial and sagittal fat-saturated T1-weighted  images of the lumbar spine were obtained.    Findings:    The thoracic vertebrae are in normal alignment.      There is diffuse metastatic involvement of the all thoracic vertebral  bodies, partially visualized sternum and multiple ribs, as well as  multiple cervical vertebrae. There is also involvement of the spinous  processes of T2 and T3 and the right transverse process of T10. There  is ventral epidural and right foraminal extension of the mass at T2-3  with resultant mild spinal canal and moderate right neural foraminal  narrowing. There is effacement of the ventral subarachnoid space by  this mass with abutment of the spinal cord. No abnormal cord signal.  Mild superior endplate pathologic compression fracture of T3.    There is paraspinous extension of the masses at T3 and T4 on both  sides, T7 on the right, and T8 on the right. At T8, the mass seems to  involve the parietal pleura.     There is a 2.8 x 2.1 cm mass at T1-2 adjacent to the left second rib.  There is 4 x 3.2 cm mass in the right adrenal gland. Bilateral hilar  lymphadenopathy and partially visualized 2.7 cm mass in the left lung,  appear grossly unchanged from the 12/23/2016 dated PET/CT.    Multilevel Schmorl's node-like endplate deformities. Mild disc height  narrowing at T3-4 and T4-5. Right central disc protrusion with  effacement of the ventral subarachnoid space and  flattening of the  cord at T4-5.    Lumbar:   There are 5 lumbar-type vertebrae.     There are increased diffuse metastatic lesions throughout the lumbar  and sacral vertebral bodies and bilateral iliac bones since  10/18/2016. There is also involvement of the right inferior articular  process of L4. There is no spinal canal or neural foraminal extension  of metastasis. However, there is paraspinous extension at L1-L3,  greatest on the left. No pathologic compression fracture.    The tip of the conus medullaris is at upper L1.  The lumbar vertebral  column appears normally aligned.  There is mild disc height narrowing  and varying degrees of disc dehydration from L3-S1.     On a level by level basis:    L1-2: Left central/subarticular disc protrusion. Bilateral facet  hypertrophy. No spinal canal or neural foraminal stenosis.    L2-3: Right asymmetric disc bulge and bilateral facet hypertrophy.  Mild spinal canal narrowing. Mild right neural foraminal narrowing. No  left neural foraminal stenosis.    L3-4: Circumferential disc bulge and bilateral facet hypertrophy.  Effacement of the lateral recesses with possible abutment of the  traversing bilateral L4 nerve roots. Mild bilateral neural foraminal  narrowing. Mild spinal canal narrowing.    L4-5: Disc bulge and superimposed central protrusion. Bilateral facet  hypertrophy. Mild spinal canal and mild bilateral neural foraminal  narrowing.    L5-S1: Disc bulge and superimposed right central protrusion abuts the  traversing right S1 nerve root at the lateral recess. Mild right  neural foraminal narrowing. No spinal canal or left neural foraminal  stenosis.      Impression    Impression:   1. Diffuse metastatic involvement of the cervical and thoracic  vertebrae, multiple ribs, and sternum, grossly unchanged since the  12/20/2016 dated PET/CT accounting for the differences in technique.  Ventral epidural and neural foraminal extension of the metastasis at  T2-3 results  in mild spinal canal and moderate right neural foraminal  stenosis. There is also abutment of the cord without abnormal cord  signal.   2. Mild pathologic compression fracture of the T3 upper vertebral  body, unchanged from 12/23/2016.  3. Partially visualized bilateral hilar lymphadenopathy, left lung and  right adrenal gland metastases, also stable since 12/23/2016.  4. Diffuse metastatic involvement of the lumbosacral vertebrae and  bilateral iliac bones, increased from the 10/18/2016 dated MRI. No  lumbar epidural, spinal canal or neural foraminal extension. No lumbar  pathologic compression fracture.  5. Multilevel lumbar degenerative changes with mild spinal canal  narrowing from L2-3 through L4-5. Mild bilateral neural foraminal  narrowing at L3-4, L4-5 and mild right neural foraminal narrowing at  L5-S1.     I have personally reviewed the examination and initial interpretation  and I agree with the findings.    WILMER STONE MD       ECOG performance status 1.      ASSESSMENT:   A 41-year-old gentleman with stage IV adenocarcinoma of the lung, initially presented with a left frontal brain mass which was resected and had radiation, eventually was diagnosed with bone metastases after 2 cycles of adjuvant cisplatin and pemetrexed and has had 3 cycles of cisplatin and pemetrexed and was started on ometa as well.   He is on [id number] trial which is testing nivolumab in combination with an IL-15 super agonist.    - Last PET scan was performed 12/23/16 which showed progression at multiple sites. He is also in process of getting palliative radiation to painful bone metastasis.   - We have decided to continue the treatment under trial and repeat a scan in next few weeks given his good performance status and potential element of pseudoprogression.  - We also have intitiated the screening process for MIRATI and STARTRK study as well.   - He needs a follow up PET scan.     PLAN:  PET CT scan in the first week of  february  Guardant 360 as part of screening  Continue Nivolumab/Alt 803, chemo today  RTC MD 2/7/17 or per study protocol  EZEQUIEL MORGAN MD    1/17/2017  HPI      ROS      Physical Exam

## 2017-01-17 NOTE — NURSING NOTE
Performed pre dose vitals on pt in clinic for research at 2:40 p.m:  BP- 130/84  P- 99  R- 16  O2- 98%  T- 97.4  Performed post dose vitals on pt in clinic for research at:  3:00 p.m. :  BP- 122/85  P-101  R-16  O2-98%  T-98.5  3:15:  BP-130/83  P-94  R-16  O2-99%  T-98.3  3:45:  BP-133/88  P-100  R-16  O2-100%  T-98.4  4:30:  BP-124/76  P-103  R-16  O2-100%  T-98.4  Shana Motley, CMA

## 2017-01-17 NOTE — Clinical Note
1/17/2017      RE: Naren Kimble  [address]  [place] [address]       [facility] PHYSICIANS  HEMATOLOGY ONCOLOGY    ONCOLOGY FOLLOWUP NOTE      DIAGNOSIS:  Stage IV non-small cell lung cancer.  Initially presented in 07/2016 with a brain mass, which was resected and was consistent with metastatic adenocarcinoma of the lung.  Negative EGFR and ALK rearrangement studies.  I do not have information about ROS1.  CT scan in 07/2016 did not have any definitive mass in the lung.  PET/CT 08/2015 showed 1 cm mass in the left lower lobe.  Bronchoscopy was negative with the [facility].  He proceeded with cisplatin and Alimta for 6 cycles after stereotactic radiation to resection bed and started chemotherapy 09/19/2016.  Imaging after 2 cycles of chemotherapy were consistent with metastatic disease in the bone.  He was started on Zometa and has had 3 cycles of adjuvant cisplatin and Alimta.      TREATMENT:  Naren Kimble is enrolling in a clinical trial, [id number] with Nivolumab first treatment 11/23/16.      SUBJECTIVE:  The patient was seen as a followup today. His issues with rash have mostly resolved. He has disease progression with painful metastasis and has started palliative radiation. He has a lumg on anterior/left chest wall which is also site of disease progression, it is not painful.     REVIEW OF SYSTEMS:  A complete review of systems was performed and found to be negative other than pertinent positives mentioned in history of present illness.     Past medical, social histories reviewed.    Meds- Reviewed.     PHYSICAL EXAMINATION:   VITAL SIGNS: /82 mmHg  Pulse 99  Temp(Src) 98.1  F (36.7  C) (Tympanic)  Resp 18  Wt 115.35 kg (254 lb 4.8 oz)  SpO2 98%  GENERAL: Sitting comfortably.   HEENT: Pupils are equal. Oropharynx is clear.   NECK: No cervical or supraclavicular lymphadenopathy.   LUNGS: Clear bilaterally.   HEART: S1, S2, regular.   ABDOMEN: Soft, nontender,  nondistended, no hepatosplenomegaly.   EXTREMITIES: Warm, well perfused.   NEUROLOGIC: Alert, awake.   SKIN:No rash  LYMPHATICS: No edema.      LABORATORY DATA:  Recent Labs   Lab Test  01/17/17   0830  01/10/17   1035   NA  134  136   POTASSIUM  4.3  4.8   CHLORIDE  100  104   CO2  26  26   ANIONGAP  7  6   BUN  19  16   CR  0.59*  0.74   GLC  91  85   ROSENDO  8.8  9.2   MAG  2.2  2.0   PHOS  4.1  3.4     Recent Labs   Lab Test  01/17/17   0830  01/10/17   1035  01/03/17   1410   WBC  9.7  7.9  8.6   HGB  9.2*  9.1*  9.4*   PLT  358  374  386   MCV  84  87  89   NEUTROPHIL  74.5  60.1  66.8     Recent Labs   Lab Test  01/17/17   0830  01/10/17   1035  01/03/17   1410   BILITOTAL  0.3  0.3  0.3   ALKPHOS  61  60  61   ALT  18  19  19   AST  21  22  23   ALBUMIN  3.2*  3.1*  3.4   LDH  407*  391*  437*     12/23/16  1. In this patient with a history of metastatic lung cancer, there has  been significant disease progression:  a. Enlarging primary left lower lobe/lingular biopsy-proven lung  adenocarcinoma.  b. New/enlarged metastases in the chest, abdomen, and pelvis as  described above. Additionally, there is a new metastasis in the right  adenoids.  c. New/enlarged osseous metastases involving the axial and  appendicular skeleton. Unchanged pathologic fracture of the T3  superior endplate.     2. Hepatic steatosis.    Results for orders placed or performed during the hospital encounter of 01/03/17   MRI Thoracic spine w & w/o contrast    Narrative    MR LUMBAR SPINE W/O & W CONTRAST, MR THORACIC SPINE W/O &  W CONTRAST 1/3/2017 7:37 PM    History: NSCLC, bone mets, acute lower back pain, no neurological sxs,  evaluate for cord compression, intramedullary met or compression fx,  Malignant neoplasm of unspecified part of unspecified bronchus or  lung, Low back pain    Comparison: PET/CT 12/20/2016 outside lumbar MR 10/18/2016    Technique:    Thoracic spine: Sagittal T1-weighted, sagittal T2-weighted, sagittal  STIR,  sagittal diffusion weighted, axial T1-weighted, and axial  T2-weighted images of the thoracic spine were obtained without the  administration of intravenous contrast. After the administration of  intravenous contrast, fat saturated axial, and sagittal T1-weighted  images of the thoracic spine were obtained.    Lumbar spine: Sagittal T1-weighted, sagittal T2-weighted, sagittal  STIR, sagittal diffusion-weighted, axial T1-weighted, and axial  T2-weighted images of the lumbar spine were obtained without the  administration of intravenous contrast. After the administration of  intravenous contrast, axial and sagittal fat-saturated T1-weighted  images of the lumbar spine were obtained.    Findings:    The thoracic vertebrae are in normal alignment.      There is diffuse metastatic involvement of the all thoracic vertebral  bodies, partially visualized sternum and multiple ribs, as well as  multiple cervical vertebrae. There is also involvement of the spinous  processes of T2 and T3 and the right transverse process of T10. There  is ventral epidural and right foraminal extension of the mass at T2-3  with resultant mild spinal canal and moderate right neural foraminal  narrowing. There is effacement of the ventral subarachnoid space by  this mass with abutment of the spinal cord. No abnormal cord signal.  Mild superior endplate pathologic compression fracture of T3.    There is paraspinous extension of the masses at T3 and T4 on both  sides, T7 on the right, and T8 on the right. At T8, the mass seems to  involve the parietal pleura.     There is a 2.8 x 2.1 cm mass at T1-2 adjacent to the left second rib.  There is 4 x 3.2 cm mass in the right adrenal gland. Bilateral hilar  lymphadenopathy and partially visualized 2.7 cm mass in the left lung,  appear grossly unchanged from the 12/23/2016 dated PET/CT.    Multilevel Schmorl's node-like endplate deformities. Mild disc height  narrowing at T3-4 and T4-5. Right central disc  protrusion with  effacement of the ventral subarachnoid space and flattening of the  cord at T4-5.    Lumbar:   There are 5 lumbar-type vertebrae.     There are increased diffuse metastatic lesions throughout the lumbar  and sacral vertebral bodies and bilateral iliac bones since  10/18/2016. There is also involvement of the right inferior articular  process of L4. There is no spinal canal or neural foraminal extension  of metastasis. However, there is paraspinous extension at L1-L3,  greatest on the left. No pathologic compression fracture.    The tip of the conus medullaris is at upper L1.  The lumbar vertebral  column appears normally aligned.  There is mild disc height narrowing  and varying degrees of disc dehydration from L3-S1.     On a level by level basis:    L1-2: Left central/subarticular disc protrusion. Bilateral facet  hypertrophy. No spinal canal or neural foraminal stenosis.    L2-3: Right asymmetric disc bulge and bilateral facet hypertrophy.  Mild spinal canal narrowing. Mild right neural foraminal narrowing. No  left neural foraminal stenosis.    L3-4: Circumferential disc bulge and bilateral facet hypertrophy.  Effacement of the lateral recesses with possible abutment of the  traversing bilateral L4 nerve roots. Mild bilateral neural foraminal  narrowing. Mild spinal canal narrowing.    L4-5: Disc bulge and superimposed central protrusion. Bilateral facet  hypertrophy. Mild spinal canal and mild bilateral neural foraminal  narrowing.    L5-S1: Disc bulge and superimposed right central protrusion abuts the  traversing right S1 nerve root at the lateral recess. Mild right  neural foraminal narrowing. No spinal canal or left neural foraminal  stenosis.      Impression    Impression:   1. Diffuse metastatic involvement of the cervical and thoracic  vertebrae, multiple ribs, and sternum, grossly unchanged since the  12/20/2016 dated PET/CT accounting for the differences in technique.  Ventral epidural  and neural foraminal extension of the metastasis at  T2-3 results in mild spinal canal and moderate right neural foraminal  stenosis. There is also abutment of the cord without abnormal cord  signal.   2. Mild pathologic compression fracture of the T3 upper vertebral  body, unchanged from 12/23/2016.  3. Partially visualized bilateral hilar lymphadenopathy, left lung and  right adrenal gland metastases, also stable since 12/23/2016.  4. Diffuse metastatic involvement of the lumbosacral vertebrae and  bilateral iliac bones, increased from the 10/18/2016 dated MRI. No  lumbar epidural, spinal canal or neural foraminal extension. No lumbar  pathologic compression fracture.  5. Multilevel lumbar degenerative changes with mild spinal canal  narrowing from L2-3 through L4-5. Mild bilateral neural foraminal  narrowing at L3-4, L4-5 and mild right neural foraminal narrowing at  L5-S1.     I have personally reviewed the examination and initial interpretation  and I agree with the findings.    WILMER STONE MD       ECOG performance status 1.      ASSESSMENT:   A 41-year-old gentleman with stage IV adenocarcinoma of the lung, initially presented with a left frontal brain mass which was resected and had radiation, eventually was diagnosed with bone metastases after 2 cycles of adjuvant cisplatin and pemetrexed and has had 3 cycles of cisplatin and pemetrexed and was started on ometa as well.   He is on [id number] trial which is testing nivolumab in combination with an IL-15 super agonist.    - Last PET scan was performed 12/23/16 which showed progression at multiple sites. He is also in process of getting palliative radiation to painful bone metastasis.   - We have decided to continue the treatment under trial and repeat a scan in next few weeks given his good performance status and potential element of pseudoprogression.  - We also have intitiated the screening process for MIRATI and STARTRK study as well.   - He needs a follow  up PET scan.     PLAN:  PET CT scan in the first week of february  Guardant 360 as part of screening  Continue Nivolumab/Alt 803, chemo today  RTC MD 2/7/17 or per study protocol  NENITA BARTHOLOMEW MD    1/17/2017  HPI      ROS      Physical Exam          Expand All Collapse All    Research RN met with subject for C2W3 visit.  Subject was consented and agrees to submit tissue for both Mirati and STARTRK trials.      Pain in back and hips is 2/10 and mobility is better with current pain meds to include oxycodone, flexeril, fentenyl patch, tylenol and ibuprofen.     Appetite is good and constipation is controlled with OTC medications.    Subject has started palliative radiation.  Protocol does not state this is contraindicated and study site has confirmed this is not contraindicated.      Study med released.            Nenita Bartholomew MD

## 2017-01-17 NOTE — NURSING NOTE
"Naren Kimble is a 41 year old male who presents for:  Chief Complaint   Patient presents with     Blood Draw     Oncology Clinic Visit     Return patient visit- Lung ca        Initial Vitals:  /82 mmHg  Pulse 99  Temp(Src) 98.1  F (36.7  C) (Tympanic)  Resp 18  Wt 115.35 kg (254 lb 4.8 oz)  SpO2 98% Estimated body mass index is 32.64 kg/(m^2) as calculated from the following:    Height as of 12/27/16: 1.88 m (6' 2\").    Weight as of this encounter: 115.35 kg (254 lb 4.8 oz).. There is no height on file to calculate BSA. BP completed using cuff size: NA (Not Taken)  Data Unavailable No LMP for male patient. Allergies and medications reviewed.     Medications: Medication refills not needed today.  Pharmacy name entered into EPIC:    [facility] [place], [place] - [address] [place] [address] [address] [facility] [address] [place] [address] AT [facility] [address]    Comments: vitals done in lab    5 minutes for nursing intake (face to face time)   Baljeet Torres CMA          "

## 2017-01-17 NOTE — Clinical Note
1/17/2017       RE: Naren Kimble  [address]  [facility] [address]     Dear Colleague,    Thank you for referring your patient, Naren Kimble, to the [facility]CER [facility]. Please see a copy of my visit note below.    [facility] PHYSICIANS  HEMATOLOGY ONCOLOGY    ONCOLOGY FOLLOWUP NOTE      DIAGNOSIS:  Stage IV non-small cell lung cancer.  Initially presented in 07/2016 with a brain mass, which was resected and was consistent with metastatic adenocarcinoma of the lung.  Negative EGFR and ALK rearrangement studies.  I do not have information about ROS1.  CT scan in 07/2016 did not have any definitive mass in the lung.  PET/CT 08/2015 showed 1 cm mass in the left lower lobe.  Bronchoscopy was negative with the [facility] system.  He proceeded with cisplatin and Alimta for 6 cycles after stereotactic radiation to resection bed and started chemotherapy 09/19/2016.  Imaging after 2 cycles of chemotherapy were consistent with metastatic disease in the bone.  He was started on Zometa and has had 3 cycles of adjuvant cisplatin and Alimta.      TREATMENT:  Naren Kimble is enrolling in a clinical trial, [id number] with Nivolumab first treatment 11/23/16.      SUBJECTIVE:  The patient was seen as a followup today. ***    REVIEW OF SYSTEMS:  A complete review of systems was performed and found to be negative other than pertinent positives mentioned in history of present illness.     Past medical, social histories reviewed.    Meds- Reviewed.     PHYSICAL EXAMINATION:   VITAL SIGNS: /82 mmHg  Pulse 99  Temp(Src) 98.1  F (36.7  C) (Tympanic)  Resp 18  Wt 115.35 kg (254 lb 4.8 oz)  SpO2 98%  GENERAL: Sitting comfortably.   HEENT: Pupils are equal. Oropharynx is clear.   NECK: No cervical or supraclavicular lymphadenopathy.   LUNGS: Clear bilaterally.   HEART: S1, S2, regular.   ABDOMEN: Soft, nontender, nondistended, no hepatosplenomegaly.   EXTREMITIES: Warm, well perfused.    NEUROLOGIC: Alert, awake.   SKIN: he has rash on his left arm, lower anterior abdominal wall and lower legs.   LYMPHATICS: No edema.      LABORATORY DATA:  Recent Labs   Lab Test  01/17/17   0830  01/10/17   1035   NA  134  136   POTASSIUM  4.3  4.8   CHLORIDE  100  104   CO2  26  26   ANIONGAP  7  6   BUN  19  16   CR  0.59*  0.74   GLC  91  85   ROSENDO  8.8  9.2   MAG  2.2  2.0   PHOS  4.1  3.4     Recent Labs   Lab Test  01/17/17   0830  01/10/17   1035  01/03/17   1410   WBC  9.7  7.9  8.6   HGB  9.2*  9.1*  9.4*   PLT  358  374  386   MCV  84  87  89   NEUTROPHIL  74.5  60.1  66.8     Recent Labs   Lab Test  01/17/17   0830  01/10/17   1035  01/03/17   1410   BILITOTAL  0.3  0.3  0.3   ALKPHOS  61  60  61   ALT  18  19  19   AST  21  22  23   ALBUMIN  3.2*  3.1*  3.4   LDH  407*  391*  437*     12/23/16  1. In this patient with a history of metastatic lung cancer, there has  been significant disease progression:  a. Enlarging primary left lower lobe/lingular biopsy-proven lung  adenocarcinoma.  b. New/enlarged metastases in the chest, abdomen, and pelvis as  described above. Additionally, there is a new metastasis in the right  adenoids.  c. New/enlarged osseous metastases involving the axial and  appendicular skeleton. Unchanged pathologic fracture of the T3  superior endplate.     2. Hepatic steatosis.    Results for orders placed or performed during the hospital encounter of 01/03/17   MRI Thoracic spine w & w/o contrast    Narrative    MR LUMBAR SPINE W/O & W CONTRAST, MR THORACIC SPINE W/O &  W CONTRAST 1/3/2017 7:37 PM    History: NSCLC, bone mets, acute lower back pain, no neurological sxs,  evaluate for cord compression, intramedullary met or compression fx,  Malignant neoplasm of unspecified part of unspecified bronchus or  lung, Low back pain    Comparison: PET/CT 12/20/2016 outside lumbar MR 10/18/2016    Technique:    Thoracic spine: Sagittal T1-weighted, sagittal T2-weighted, sagittal  STIR,  sagittal diffusion weighted, axial T1-weighted, and axial  T2-weighted images of the thoracic spine were obtained without the  administration of intravenous contrast. After the administration of  intravenous contrast, fat saturated axial, and sagittal T1-weighted  images of the thoracic spine were obtained.    Lumbar spine: Sagittal T1-weighted, sagittal T2-weighted, sagittal  STIR, sagittal diffusion-weighted, axial T1-weighted, and axial  T2-weighted images of the lumbar spine were obtained without the  administration of intravenous contrast. After the administration of  intravenous contrast, axial and sagittal fat-saturated T1-weighted  images of the lumbar spine were obtained.    Findings:    The thoracic vertebrae are in normal alignment.      There is diffuse metastatic involvement of the all thoracic vertebral  bodies, partially visualized sternum and multiple ribs, as well as  multiple cervical vertebrae. There is also involvement of the spinous  processes of T2 and T3 and the right transverse process of T10. There  is ventral epidural and right foraminal extension of the mass at T2-3  with resultant mild spinal canal and moderate right neural foraminal  narrowing. There is effacement of the ventral subarachnoid space by  this mass with abutment of the spinal cord. No abnormal cord signal.  Mild superior endplate pathologic compression fracture of T3.    There is paraspinous extension of the masses at T3 and T4 on both  sides, T7 on the right, and T8 on the right. At T8, the mass seems to  involve the parietal pleura.     There is a 2.8 x 2.1 cm mass at T1-2 adjacent to the left second rib.  There is 4 x 3.2 cm mass in the right adrenal gland. Bilateral hilar  lymphadenopathy and partially visualized 2.7 cm mass in the left lung,  appear grossly unchanged from the 12/23/2016 dated PET/CT.    Multilevel Schmorl's node-like endplate deformities. Mild disc height  narrowing at T3-4 and T4-5. Right central disc  protrusion with  effacement of the ventral subarachnoid space and flattening of the  cord at T4-5.    Lumbar:   There are 5 lumbar-type vertebrae.     There are increased diffuse metastatic lesions throughout the lumbar  and sacral vertebral bodies and bilateral iliac bones since  10/18/2016. There is also involvement of the right inferior articular  process of L4. There is no spinal canal or neural foraminal extension  of metastasis. However, there is paraspinous extension at L1-L3,  greatest on the left. No pathologic compression fracture.    The tip of the conus medullaris is at upper L1.  The lumbar vertebral  column appears normally aligned.  There is mild disc height narrowing  and varying degrees of disc dehydration from L3-S1.     On a level by level basis:    L1-2: Left central/subarticular disc protrusion. Bilateral facet  hypertrophy. No spinal canal or neural foraminal stenosis.    L2-3: Right asymmetric disc bulge and bilateral facet hypertrophy.  Mild spinal canal narrowing. Mild right neural foraminal narrowing. No  left neural foraminal stenosis.    L3-4: Circumferential disc bulge and bilateral facet hypertrophy.  Effacement of the lateral recesses with possible abutment of the  traversing bilateral L4 nerve roots. Mild bilateral neural foraminal  narrowing. Mild spinal canal narrowing.    L4-5: Disc bulge and superimposed central protrusion. Bilateral facet  hypertrophy. Mild spinal canal and mild bilateral neural foraminal  narrowing.    L5-S1: Disc bulge and superimposed right central protrusion abuts the  traversing right S1 nerve root at the lateral recess. Mild right  neural foraminal narrowing. No spinal canal or left neural foraminal  stenosis.      Impression    Impression:   1. Diffuse metastatic involvement of the cervical and thoracic  vertebrae, multiple ribs, and sternum, grossly unchanged since the  12/20/2016 dated PET/CT accounting for the differences in technique.  Ventral epidural  and neural foraminal extension of the metastasis at  T2-3 results in mild spinal canal and moderate right neural foraminal  stenosis. There is also abutment of the cord without abnormal cord  signal.   2. Mild pathologic compression fracture of the T3 upper vertebral  body, unchanged from 12/23/2016.  3. Partially visualized bilateral hilar lymphadenopathy, left lung and  right adrenal gland metastases, also stable since 12/23/2016.  4. Diffuse metastatic involvement of the lumbosacral vertebrae and  bilateral iliac bones, increased from the 10/18/2016 dated MRI. No  lumbar epidural, spinal canal or neural foraminal extension. No lumbar  pathologic compression fracture.  5. Multilevel lumbar degenerative changes with mild spinal canal  narrowing from L2-3 through L4-5. Mild bilateral neural foraminal  narrowing at L3-4, L4-5 and mild right neural foraminal narrowing at  L5-S1.     I have personally reviewed the examination and initial interpretation  and I agree with the findings.    WILMER STONE MD       ECOG performance status 1.      ASSESSMENT:   A 41-year-old gentleman with stage IV adenocarcinoma of the lung, initially presented with a left frontal brain mass which was resected and had radiation, eventually was diagnosed with bone metastases after 2 cycles of adjuvant cisplatin and pemetrexed and has had 3 cycles of cisplatin and pemetrexed and was started on ometa as well.   He is on [id number] trial which is testing nivolumab in combination with an IL-15 super agonist.    - Last PET scan was performed 12/23/16 which showed progression at multiple sites. He is also in process of getting palliative radiation to painful bone metastasis.   - We have decided to continue the treatment under trial and repeat a scan in next few weeks given his good performance status and potential element of pseudoprogression.  - We also have intitiated the screening process for MIRATI and STARTRK study as well.   - He needs a follow  up PET scan.     PLAN:  PET CT scan in the first week of february  Guardant 360 as part of screening  Continue Nivolumab/Alt 803, chemo today  RTC MD 2/7/17 or per study protocol  EZEQUIEL MORGAN MD    1/17/2017

## 2017-01-17 NOTE — NURSING NOTE
Chief Complaint   Patient presents with     Blood Draw     Vitals done and labs drawn peripherally from left arm    Marlena Omalley LPN

## 2017-01-17 NOTE — MR AVS SNAPSHOT
After Visit Summary   1/17/2017    Naren Kimble    MRN: [id number]           Patient Information     Date Of Birth          1975        Visit Information        Provider Department      1/17/2017 1:30 PM  22 ATC;  ONCOLOGY INFUSION [facility]        Today's Diagnoses     Non-small cell lung cancer (NSCLC) (H)    -  1       Care Instructions    Tylenol and Benadryl due again at 5:45          January 2017 Sunday Monday Tuesday Wednesday Thursday Friday Saturday   1     2     3     [facility] LAB DRAW    1:30 PM   (15 min.)    [facility] LAB DRAW   [facility] Draw     [facility] RETURN    2:00 PM   (50 min.)   Yulia Lepe APRN CNP   [facility] ONC INFUSION 120    3:00 PM   (120 min.)    ONCOLOGY INFUSION   [facility]     MR BRAIN W    6:00 PM   (60 min.)   [address], MRI     MR THORACIC SPINE WWO    7:00 PM   (60 min.)   UR2   [facility], MRI     MR LUMBAR SPINE WWO    8:00 PM   (60 min.)   [address], MRI 4     5     6     [facility] LAB DRAW    9:15 AM   (15 min.)    [facility] LAB DRAW   [facility] Draw 7       8     9     10     [facility] LAB DRAW   10:45 AM   (15 min.)    [facility] DRAW   [facility] Draw     [facility] RETURN   11:20 AM   (50 min.)   Yulia Lepe APRN CNP   [facility] ONC INFUSION 120    2:00 PM   (120 min.)    ONCOLOGY INFUSION   [facility] 11     P CONSULT    2:00 PM   (90 min.)   Eden Kovacs MD   Radiation Oncology Clinic     [facility] TCT/SIM SUITE    3:00 PM   (60 min.)   Eden Kovacs MD   [facility] Oncology Clinic 12     13     [facility] TREATMENT PLAN VISIT    7:00 AM   (15 min.)   Eden Kovacs MD   [facility] Oncology Clinic 14       15     16     [facility] EXTERNAL RADIATION TREATMT    7:30 AM   (15 min.)   [facility] RAD ONC ELEKTA   Radiation Oncology Clinic 17     [facility]  EXTERNAL RADIATION TREATMT    7:30 AM   (15 min.)   P RAD ONC ELEKTA   Radiation Oncology Clinic     P [facility] LAB DRAW    9:45 AM   (15 min.)   [facility]   [facility] Lab Draw     [facility] RETURN   10:00 AM   (30 min.)   Nenita Bartholomew MD   [facility]     [facility] ONC INFUSION 120    1:30 PM   (120 min.)   [facility] ONCOLOGY INFUSION   [facility] 18     [facility] EXTERNAL RADIATION TREATMT    7:30 AM   (15 min.)   [facility] RAD ONC ELEKTA   [facility] 19     [facility] EXTERNAL RADIATION TREATMT    7:30 AM   (15 min.)   P RAD ONC ELEKTA   [facility] Oncology Clinic     [facility] ON TREATMENT VISIT    7:45 AM   (15 min.)   Eden Kovacs MD   [facility] 20     [facility] EXTERNAL RADIATION TREATMT    7:30 AM   (15 min.)   P RAD ONC ELEKTA   [facility] Oncology Clinic 21       22     23     24     [facility] DRAW    1:30 PM   (15 min.)   [facility] DRAW   [facility] Lab Draw     [facility] RETURN    2:00 PM   (50 min.)   Yul[facility]N CNP   [facility]    3:00 PM   (120 min.)   [facility] ONCOLOGY INFUSION   [facility] 25     26     27     28       29     30     31     [facility] DRAW    9:45 AM   (15 min.)   [facility] DRAW   [facility] Lab Draw     [facility] RETURN   10:30 AM   (50 min.)   Yulia Lepe APRN CNP   [facility]     [facility] ONC INFUSION 120   12:00 PM   (120 min.)   [facility] ONCOLOGY INFUSION   [facility]                                February 2017 Sunday Monday Tuesday Wednesday Thursday Friday Saturday                  1     2     3     4       5     6     PET ONCOLOGY WHOLE BODY    7:30 AM   (45 min.)   UUPET1   [facility] [place] PET CT 7     [facility] DRAW    4:00 PM   (15 min.)   [facility] DRAW   [facility] Lab Draw     [facility] RETURN    4:30 PM   (30 min.)   Khris Randle DO   [facility] 8      9     10     11       12     13     14     15     16     17     18       19     20     21     22     23  Happy Birthday!     24     25       26     27     28                                      Lab Results:  Recent Results (from the past 12 hour(s))   CBC with platelets differential    Collection Time: 01/17/17  8:30 AM   Result Value Ref Range    WBC 9.7 4.0 - 11.0 10e9/L    RBC Count 3.47 (L) 4.4 - 5.9 10e12/L    Hemoglobin 9.2 (L) 13.3 - 17.7 g/dL    Hematocrit 29.3 (L) 40.0 - 53.0 %    MCV 84 78 - 100 fl    MCH 26.5 26.5 - 33.0 pg    MCHC 31.4 (L) 31.5 - 36.5 g/dL    RDW 13.9 10.0 - 15.0 %    Platelet Count 358 150 - 450 10e9/L    Diff Method Automated Method     % Neutrophils 74.5 %    % Lymphocytes 15.9 %    % Monocytes 6.1 %    % Eosinophils 2.4 %    % Basophils 0.5 %    % Immature Granulocytes 0.6 %    Nucleated RBCs 0 0 /100    Absolute Neutrophil 7.2 1.6 - 8.3 10e9/L    Absolute Lymphocytes 1.5 0.8 - 5.3 10e9/L    Absolute Monocytes 0.6 0.0 - 1.3 10e9/L    Absolute Eosinophils 0.2 0.0 - 0.7 10e9/L    Absolute Basophils 0.1 0.0 - 0.2 10e9/L    Abs Immature Granulocytes 0.1 0 - 0.4 10e9/L    Absolute Nucleated RBC 0.0    Comprehensive metabolic panel    Collection Time: 01/17/17  8:30 AM   Result Value Ref Range    Sodium 134 133 - 144 mmol/L    Potassium 4.3 3.4 - 5.3 mmol/L    Chloride 100 94 - 109 mmol/L    Carbon Dioxide 26 20 - 32 mmol/L    Anion Gap 7 3 - 14 mmol/L    Glucose 91 70 - 99 mg/dL    Urea Nitrogen 19 7 - 30 mg/dL    Creatinine 0.59 (L) 0.66 - 1.25 mg/dL    GFR Estimate >90  Non  GFR Calc   >60 mL/min/1.7m2    GFR Estimate If Black >90   GFR Calc   >60 mL/min/1.7m2    Calcium 8.8 8.5 - 10.1 mg/dL    Bilirubin Total 0.3 0.2 - 1.3 mg/dL    Albumin 3.2 (L) 3.4 - 5.0 g/dL    Protein Total 9.1 (H) 6.8 - 8.8 g/dL    Alkaline Phosphatase 61 40 - 150 U/L    ALT 18 0 - 70 U/L    AST 21 0 - 45 U/L   Magnesium    Collection Time: 01/17/17  8:30 AM   Result Value Ref  Range    Magnesium 2.2 1.6 - 2.3 mg/dL   Phosphorus    Collection Time: 01/17/17  8:30 AM   Result Value Ref Range    Phosphorus 4.1 2.5 - 4.5 mg/dL   Lactate Dehydrogenase    Collection Time: 01/17/17  8:30 AM   Result Value Ref Range    Lactate Dehydrogenase 407 (H) 85 - 227 U/L             Follow-ups after your visit        Your next 10 appointments already scheduled     Jan 18, 2017  7:30 AM   EXTERNAL RADIATION TREATMENT with P RAD ONC ELEKTA   Radiation Oncology Clinic ([facility])    [facility]  1st Floor  [address] [facility] [address]   [phone]            Jan 19, 2017  7:30 AM   EXTERNAL RADIATION TREATMENT with [facility] RAD ONC ELEKTA   Radiation Oncology Clinic ([facility])    [facility]  1st Floor  [address] [address]   [phone]            Jan 19, 2017  7:45 AM   ON TREATMENT VISIT with Eden Kovacs MD   Radiation Oncology Clinic ([facility])    [facility]  1st Floor  [address] [address]   [phone]            Jan 20, 2017  7:30 AM   EXTERNAL RADIATION TREATMENT with [facility] RAD ONC ELEKTA   Radiation Oncology Clinic ([facility])    [facility]  1st Floor  [address] [address]   [phone]            Jan 24, 2017  1:30 PM   [facility] Lab Draw with  [facility] LAB DRAW   [facility] Lab Draw ([facility])    [address]  2nd Floor  [facility] [address] [phone]            Jan 24, 2017  2:00 PM   (Arrive by 1:45 PM)   Return Visit with DANIA Stallworth CNP   [facility] ([facility])    [address]  2nd Floor  [facility] [address] [phone]            Jan 24, 2017  3:00 PM   Infusion 120 with AARTI ONCOLOGY INFUSION, [facility] 23 ATC   [facility] (  [facility])    [address] [place] [address] [phone]            Jan 31, 2017  9:45 AM   [facility] Lab Draw with [facility] LAB DRAW   [facility] Lab Draw ([facility])    [address] [place] [address] [phone]            Jan 31, 2017 10:30 AM   (Arrive by 10:15 AM)   Return Visit with DANIA Stallworth CNP   [facility] ([facility])    [address] [place] [id number] [phone]              Future tests that were ordered for you today     Open Future Orders        Priority Expected Expires Ordered    PET Oncology (Eyes to Thighs) Routine 1/23/2017 1/17/2018 1/17/2017            Who to contact     If you have questions or need follow up information about today's clinic visit or your schedule please contact [employer] CANCER [facility] directly at [phone].  Normal or non-critical lab and imaging results will be communicated to you by FreeDrivehart, letter or phone within 4 business days after the clinic has received the results. If you do not hear from us within 7 days, please contact the clinic through [employer] or phone. If you have a critical or abnormal lab result, we will notify you by phone as soon as possible.  Submit refill requests through Phorm or call your pharmacy and they will forward the refill request to us. Please allow 3 business days for your refill to be completed.          Additional Information About Your Visit        Phorm Information     Phorm gives you secure access to your electronic health record. If you see a primary care provider, you can also send messages to your care team and make appointments. If you have questions, please call your primary care clinic.  If you do not have a primary care provider, please call [phone] and they will assist you.        Care EveryWhere ID     This is  your Care EveryWhere ID. This could be used by other organizations to access your [place] medical records  [id number]        Your Vitals Were     Pulse Temperature Respirations Pulse Oximetry          94 98.3  F (36.8  C) (Tympanic) 16 99%         Blood Pressure from Last 3 Encounters:   01/17/17 130/83   01/17/17 127/82   01/11/17 136/84    Weight from Last 3 Encounters:   01/17/17 115.35 kg (254 lb 4.8 oz)   01/10/17 115.486 kg (254 lb 9.6 oz)   01/03/17 116.302 kg (256 lb 6.4 oz)              We Performed the Following     Treatment Conditions     Vital signs     Weigh patient        Primary Care Provider    None Specified       No primary provider on file.        Thank you!     Thank you for choosing [facility] CANCER CLINIC  for your care. Our goal is always to provide you with excellent care. Hearing back from our patients is one way we can continue to improve our services. Please take a few minutes to complete the written survey that you may receive in the mail after your visit with us. Thank you!             Your Updated Medication List - Protect others around you: Learn how to safely use, store and throw away your medicines at www.disposemymeds.org.          This list is accurate as of: 1/17/17  3:23 PM.  Always use your most recent med list.                   Brand Name Dispense Instructions for use    CYCLOBENZAPRINE HCL PO      Take 10 mg by mouth 3 times daily as needed       diphenhydrAMINE 25 MG tablet    BENADRYL    50 tablet    Take 1-2 tablets (25-50 mg) by mouth every 6 hours as needed for itching or allergies       famotidine 10 MG tablet    PEPCID    20 tablet    Take 2 tablets (20 mg) by mouth 2 times daily       fentaNYL 25 mcg/hr 72 hr patch    DURAGESIC    10 patch    Place 1 patch onto the skin every 72 hours       FOLIC ACID PO      Take 800 mcg by mouth daily       levETIRAcetam 100 MG/ML solution    KEPPRA     Take 10 mg/kg by mouth 2 times daily       lidocaine 5 % Patch     LIDODERM    30 patch    Apply up to 3 patches to painful area at once for up to 12 h within a 24 h period.  Remove after 12 hours.       loratadine 10 MG tablet    CLARITIN    30 tablet    Take 1 tablet (10 mg) by mouth daily       ondansetron 8 MG tablet    ZOFRAN    9 tablet    Take 1 tablet (8 mg) by mouth every 8 hours as needed       oxyCODONE 5 MG capsule    OXY-IR    100 capsule    Take 1 capsule (5 mg) by mouth every 4 hours as needed for moderate to severe pain       PROCHLORPERAZINE MALEATE PO      Take 10 mg by mouth every 6 hours as needed for nausea or vomiting       TYLENOL PO      Take 500 mg by mouth daily as needed

## 2017-01-17 NOTE — PROGRESS NOTES
Expand All Collapse All    Research RN met with subject for C2W3 visit.  Subject was consented and agrees to submit tissue for both Mirati and STARTRK trials.      Pain in back and hips is 2/10 and mobility is better with current pain meds to include oxycodone, flexeril, fentenyl patch, tylenol and ibuprofen.     Appetite is good and constipation is controlled with OTC medications.    Subject has started palliative radiation.  Protocol does not state this is contraindicated and study site has confirmed this is not contraindicated.      Study med released.

## 2017-01-17 NOTE — PATIENT INSTRUCTIONS
Tylenol and Benadryl due again at 5:45          January 2017 Sunday Monday Tuesday Wednesday Thursday Friday Saturday   1     2     3     P [facility] DRAW    1:30 PM   (15 min.)    [facility]   [facility]     [facility] RETURN    2:00 PM   (50 min.)   Yulia Lepe APRN CNP   [facility]    3:00 PM   (120 min.)    ONCOLOGY INFUSION   [facility]     MR BRAIN W    6:00 PM   (60 min.)   UUMR2   [facility], [place], MRI     MR THORACIC SPINE WWO    7:00 PM   (60 min.)   UUMR2   [facility] [place], MRI     MR LUMBAR SPINE WWO    8:00 PM   (60 min.)   UUMR2   [facility] [place], MRI 4     5     6     P [facility] DRAW    9:15 AM   (15 min.)    [facility] DRAW   [facility] Lab Draw 7       8     9     10     P [facility] DRAW   10:45 AM   (15 min.)    [facility] DRAW   [facility] Lab Draw     [facility] RETURN   11:20 AM   (50 min.)   Yulia Lepe APRN CNP   [facility] ONC INFUSION 120    2:00 PM   (120 min.)    ONCOLOGY INFUSION   [facility] 11     UMP CONSULT    2:00 PM   (90 min.)   Eden Kovacs MD   Radiation Oncology Clinic     [facility] TCT/SIM SUITE    3:00 PM   (60 min.)   Eden Kovacs MD   Radiation Oncology Clinic 12     13     [facility] TREATMENT PLAN VISIT    7:00 AM   (15 min.)   Eden Kovacs MD   Radiation Oncology Clinic 14       15     16     [facility] EXTERNAL RADIATION TREATMT    7:30 AM   (15 min.)   [facility] RAD ONC ELEKTA   Radiation Oncology Clinic 17     [facility] EXTERNAL RADIATION TREATMT    7:30 AM   (15 min.)   [facility] RAD ONC ELEKTA   Radiation Oncology Clinic     [facility] DRAW    9:45 AM   (15 min.)    [facility]   [facility]     [facility] RETURN   10:00 AM   (30 min.)   Nenita Bartholomew MD   [facility] ONC INFUSION 120    1:30 PM   (120 min.)    ONCOLOGY INFUSION   [facility]  18     UMP EXTERNAL RADIATION TREATMT    7:30 AM   (15 min.)   P RAD ONC ELEKTA   [facility] 19     [facility] EXTERNAL RADIATION TREATMT    7:30 AM   (15 min.)   P RAD ONC ELEKTA   Radiation Oncology Clinic     [facility] ON TREATMENT VISIT    7:45 AM   (15 min.)   Eden Kovacs MD   [facility]cology [facility] 20     [facility] EXTERNAL RADIATION TREATMT    7:30 AM   (15 min.)   P RAD ONC ELEKTA   [facility]cology Clinic 21       22     23     24     [facility] DRAW    1:30 PM   (15 min.)   [facility]     [facility] RETURN    2:00 PM   (50 min.)   Yulia Lepe APRN CNP   [facility]     UMP ONC INFUSION 120    3:00 PM   (120 min.)   [facility] ONCOLOGY INFUSION   [facility] 25     26     27     28       29     30     31     [facility] DRAW    9:45 AM   (15 min.)   [facility]   [facility]     [facility] RETURN   10:30 AM   (50 min.)   Yulia Lepe APRN CNP   [facility]   12:00 PM   (120 min.)   [facility] ONCOLOGY INFUSION   [facility]                                February 2017 Sunday Monday Tuesday Wednesday Thursday Friday Saturday                  1     2     3     4       5     6     PET ONCOLOGY WHOLE BODY    7:30 AM   (45 min.)   UUPET1   [facility] [place] PET CT 7     [facility] DRAW    4:00 PM   (15 min.)   [facility] DRAW   [facility] Lab Draw     UMP RETURN    4:30 PM   (30 min.)   Khris Randle DO   [facility] 8     9     10     11       12     13     14     15     16     17     18       19     20     21     22     23  Happy Birthday!     24     25       26     27     28                                      Lab Results:  Recent Results (from the past 12 hour(s))   CBC with platelets differential    Collection Time: 01/17/17  8:30 AM   Result Value Ref Range    WBC 9.7 4.0 - 11.0 10e9/L    RBC Count  3.47 (L) 4.4 - 5.9 10e12/L    Hemoglobin 9.2 (L) 13.3 - 17.7 g/dL    Hematocrit 29.3 (L) 40.0 - 53.0 %    MCV 84 78 - 100 fl    MCH 26.5 26.5 - 33.0 pg    MCHC 31.4 (L) 31.5 - 36.5 g/dL    RDW 13.9 10.0 - 15.0 %    Platelet Count 358 150 - 450 10e9/L    Diff Method Automated Method     % Neutrophils 74.5 %    % Lymphocytes 15.9 %    % Monocytes 6.1 %    % Eosinophils 2.4 %    % Basophils 0.5 %    % Immature Granulocytes 0.6 %    Nucleated RBCs 0 0 /100    Absolute Neutrophil 7.2 1.6 - 8.3 10e9/L    Absolute Lymphocytes 1.5 0.8 - 5.3 10e9/L    Absolute Monocytes 0.6 0.0 - 1.3 10e9/L    Absolute Eosinophils 0.2 0.0 - 0.7 10e9/L    Absolute Basophils 0.1 0.0 - 0.2 10e9/L    Abs Immature Granulocytes 0.1 0 - 0.4 10e9/L    Absolute Nucleated RBC 0.0    Comprehensive metabolic panel    Collection Time: 01/17/17  8:30 AM   Result Value Ref Range    Sodium 134 133 - 144 mmol/L    Potassium 4.3 3.4 - 5.3 mmol/L    Chloride 100 94 - 109 mmol/L    Carbon Dioxide 26 20 - 32 mmol/L    Anion Gap 7 3 - 14 mmol/L    Glucose 91 70 - 99 mg/dL    Urea Nitrogen 19 7 - 30 mg/dL    Creatinine 0.59 (L) 0.66 - 1.25 mg/dL    GFR Estimate >90  Non  GFR Calc   >60 mL/min/1.7m2    GFR Estimate If Black >90   GFR Calc   >60 mL/min/1.7m2    Calcium 8.8 8.5 - 10.1 mg/dL    Bilirubin Total 0.3 0.2 - 1.3 mg/dL    Albumin 3.2 (L) 3.4 - 5.0 g/dL    Protein Total 9.1 (H) 6.8 - 8.8 g/dL    Alkaline Phosphatase 61 40 - 150 U/L    ALT 18 0 - 70 U/L    AST 21 0 - 45 U/L   Magnesium    Collection Time: 01/17/17  8:30 AM   Result Value Ref Range    Magnesium 2.2 1.6 - 2.3 mg/dL   Phosphorus    Collection Time: 01/17/17  8:30 AM   Result Value Ref Range    Phosphorus 4.1 2.5 - 4.5 mg/dL   Lactate Dehydrogenase    Collection Time: 01/17/17  8:30 AM   Result Value Ref Range    Lactate Dehydrogenase 407 (H) 85 - 227 U/L

## 2017-01-17 NOTE — PROGRESS NOTES
Infusion Nursing Note:  Naren Kimble presents today for Day 15 Cycle 2 Nivolumab and ALT-803 IDS #[id number]    Patient seen by provider today: Yes: Dr Bartholomew    Note: N/A.    Intravenous Access:  Peripheral IV placed.    Treatment Conditions:  HGB      9.2   1/17/2017  WBC      9.7   1/17/2017   ANEU      7.2   1/17/2017  PLT      358   1/17/2017     NA      134   1/17/2017                POTASSIUM      4.3   1/17/2017        MAG      2.2   1/17/2017         CR     0.59   1/17/2017                ROSENDO      8.8   1/17/2017             BILITOTAL      0.3   1/17/2017        ALBUMIN      3.2   1/17/2017                 ALT       18   1/17/2017        AST       21   1/17/2017  Results reviewed, labs MET treatment parameters, ok to proceed with treatment.      Post Infusion Assessment:  Patient tolerated infusion without incident.  Patient observed for 120 minutes post ALT-803 IDS #4909 per protocol.  Blood return noted pre and post infusion.  Site patent and intact, free from redness, edema or discomfort.  No evidence of extravasations.  Access discontinued per protocol.    POST-INFUSION OF BIOLOGICAL MEDICATION:  [employer] nurse confirmed to given Nivolumab first then ALT-803 injections.  Pt took Benadryl and Tylenol 1000mg (like last treatment) 1 hour before injections and will repeat 4 hours after injections at 1745. Pt aware of time.  Pt given ALT-803 IDS #4909 in 2 injections into left abd 1 inch apart from each other.  VS obtained every 15 minutes for 2 hours per protocol from Vick Valdez CMA. No s/s of reactions  Reviewed with patient.  Given biologic medication or medication hand-out. Inform patient if any fever, chills or signs of infection, new symptoms, abdominal pain, heart palpitations, shortness of breath, reaction, weakness, neurological changes, seek medical attention immediately and should not receive infusions. No live virus vaccines prior to or during treatment or up to 6 months  post infusion. If the patient has an upcoming procedure or surgery, this should be discussed with the rheumatologist and surgeon or provider..    Discharge Plan:   Patient declined prescription refills.  Discharge instructions reviewed with: Patient and Family.  Patient and/or family verbalized understanding of discharge instructions and all questions answered.  Copy of AVS reviewed with patient and/or family.  Patient will return 1/18 for next XRT appointment.  AVS to patient via [employer].  Patient will return 1/24 for next provider appointment.   Patient discharged in stable condition accompanied by: self and wife.  Departure Mode: Ambulatory.    Herminia Saxena RN

## 2017-01-18 NOTE — Clinical Note
1/18/2017       RE: Naren Kimble  [address]  [facility] [address]     Dear Colleague,    Thank you for referring your patient, Naren Kimble, to the RADIATION ONCOLOGY CLINIC. Please see a copy of my visit note below.    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  The patient set up, dose, and portal imagings were reviewed.      PLAN: The patient will complete radiotherapy with follow up as needed. No formal appointment was given.    PAIN MANAGEMENT PLAN: The patient will continue current pain medication    TEQUILA Kovacs M.D.  Department of Radiation Oncology  [facility]

## 2017-01-18 NOTE — PROGRESS NOTES
WEEKLY MANAGEMENT NOTE  Radiation Oncology          Patient Name: Naren Kimble  MRN: [id number]     Multiple Sites Current Dose: 1200/2000 cGy Fractions: 3/5             AP vew                                                                                              PA View                                                                                       AP View    DAILY DOSE:    400  cGy/ day,  5 times/week  DISEASE UNDER TREATMENT: T-spine, Sacrum, Left Hip    SUBJECTIVE:    Treatment Related Toxicity  (CTC V4.0)  Nausea: Grade 1: Loss of appetite without alteration in eating habits  Pain is better with current scale of 2/10    OBJECTIVE:  Wt 114.306 kg (252 lb)      IMPRESSION: The patient is tolerating the treatment.  The patient set up, dose, and portal imagings were reviewed.      PLAN: The patient will complete radiotherapy with follow up as needed. No formal appointment was given.    PAIN MANAGEMENT PLAN: The patient will continue current pain medication    TEQUILA Kovacs M.D.  Department of Radiation Oncology  [facility]

## 2017-01-24 NOTE — NURSING NOTE
"Naren Kimble is a 41 year old male who presents for:  Chief Complaint   Patient presents with     Blood Draw     labs draw from right arm and vitals taken by [facility]      Oncology Clinic Visit     return patient for infusion visit prior to infusion appt today related to lung ca         Initial Vitals:  /85 mmHg  Pulse 100  Temp(Src) 97  F (36.1  C) (Oral)  Resp 20  Ht 1.88 m (6' 2\")  Wt 114.669 kg (252 lb 12.8 oz)  BMI 32.44 kg/m2  SpO2 100% Estimated body mass index is 32.44 kg/(m^2) as calculated from the following:    Height as of this encounter: 1.88 m (6' 2\").    Weight as of this encounter: 114.669 kg (252 lb 12.8 oz).. Body surface area is 2.45 meters squared. BP completed using cuff size: NA (Not Taken)  Severe Pain (7) No LMP for male patient. Allergies and medications reviewed.     Medications: MEDICATION REFILLS NEEDED TODAY.  Pharmacy name entered into EPIC:    [facility] [place], [place] - [address] [place] [address] [id number]  [facility] [address] [place] [address] [address]    Comments: patient mentioned severe left hip pain - tylenol and oxycodone were used to help relieve pain today.    5 minutes for nursing intake (face to face time)   Murtaza De Los Santos CMA          "

## 2017-01-24 NOTE — PATIENT INSTRUCTIONS
Contact Numbers    [facility] Main Line: [phone]  [facility] Triage:  [phone]    Call triage with chills and/or temperature greater than or equal to 100.5, uncontrolled nausea/vomiting, diarrhea, constipation, dizziness, shortness of breath, chest pain, bleeding, unexplained bruising, or any new/concerning symptoms, questions/concerns.     If you are having any concerning symptoms or wish to speak to a provider before your next infusion visit, please call your care coordinator or triage to notify them so we can adequately serve you.       After Hours: [phone]    If after hours, weekends, or holidays, call main hospital  and ask for Oncology doctor on call.           January 2017 Sunday Monday Tuesday Wednesday Thursday Friday Saturday   1     2     3     [facility] LAB DRAW    1:30 PM   (15 min.)    [facility] LAB DRAW   [facility] Lab Draw     [facility] RETURN    2:00 PM   (50 min.)   Yulia Lepe APRN CNP   [facility] ONC INFUSION 120    3:00 PM   (120 min.)    ONCOLOGY INFUSION   [facility]     MR BRAIN W    6:00 PM   (60 min.)   UR2   [facility]     MR THORACIC SPINE WWO    7:00 PM   (60 min.)   UR2   [facility], [facility]     MR LUMBAR SPINE WWO    8:00 PM   (60 min.)   UR2   [facility], MRI 4     5     6     P [facility] DRAW    9:15 AM   (15 min.)    [facility]   [facility] 7       8     9     10     [facility] LAB DRAW   10:45 AM   (15 min.)    [facility] DRAW   [facility] Lab Draw     [facility] RETURN   11:20 AM   (50 min.)   Yulia Lepe APRN CNP   [facility] ONC INFUSION 120    2:00 PM   (120 min.)    ONCOLOGY INFUSION   [facility] 11     P CONSULT    2:00 PM   (90 min.)   Eden Kovacs MD   Radiation Oncology Clinic     [facility] TCT/SIM SUITE    3:00 PM   (60 min.)   Eden Kovacs MD   Radiation Oncology Clinic 12     13      [facility] TREATMENT PLAN VISIT    7:00 AM   (15 min.)   Eden Kovacs MD   Radiation Oncology Clinic 14       15     16     [facility] EXTERNAL RADIATION TREATMT    7:30 AM   (15 min.)   P RAD ONC ELEKTA   Radiation Oncology Clinic 17     [facility] EXTERNAL RADIATION TREATMT    7:30 AM   (15 min.)   P RAD ONC ELEKTA   Radiation Oncology Clinic     [facility] DRAW    9:45 AM   (15 min.)   [facility] DRAW   [facility] Lab Draw     UMP RETURN   10:00 AM   (30 min.)   Nenita Bartholomew MD   [facility]     UMP ONC INFUSION 120    1:30 PM   (120 min.)    ONCOLOGY INFUSION   [facility] 18     UMP EXTERNAL RADIATION TREATMT    7:30 AM   (15 min.)   [facility] RAD ONC ELEKTA   Radiation Oncology Clinic     UMP ON TREATMENT VISIT    7:45 AM   (15 min.)   Eden Kovacs MD   Radiation Oncology Clinic 19     [facility] EXTERNAL RADIATION TREATMT    7:30 AM   (15 min.)   [facility] RAD ONC ELEKTA   Radiation Oncology Clinic 20     UMP EXTERNAL RADIATION TREATMT    7:30 AM   (15 min.)   [facility] RAD ONC ELEKTA   Radiation Oncology Clinic     [facility] NURSE VISIT    8:20 AM   (10 min.)   Nurse,  Oncology   [facility] 21       22     23     24     [facility] DRAW    1:30 PM   (15 min.)   [facility] DRAW   [facility] Lab Draw     [facility] RETURN    2:00 PM   (50 min.)   Yulia Lepe APRN CNP   [facility]     UMP ONC INFUSION 120    3:00 PM   (120 min.)    ONCOLOGY INFUSION   [facility] 25     26     27     28       29     30     31     [facility] DRAW    9:45 AM   (15 min.)   [facility] DRAW   [facility] Lab Draw     UMP RETURN   10:30 AM   (50 min.)   Yulia Lepe APRN CNP   [facility]     UMP ONC INFUSION 120   12:00 PM   (120 min.)    ONCOLOGY INFUSION   [facility]                                February 2017 Sunday Monday Tuesday Wednesday Thursday Friday Saturday                   1     2     3     4       5     6     PET ONCOLOGY WHOLE BODY    7:30 AM   (45 min.)   UUPET1   [facility] [place] PET CT 7     [facility] LAB DRAW    4:00 PM   (15 min.)    M[facility] LAB DRAW   [facility] Masonic Lab Draw     [facility] RETURN    4:30 PM   (30 min.)   Khris Randle DO   [facility] 8     9     10     11       12     13     14     15     16     17     18       19     20     21     22     23  Happy Birthday!     24     25       26     27     28                                      Lab Results:  Recent Results (from the past 12 hour(s))   CBC with platelets differential    Collection Time: 01/24/17  1:21 PM   Result Value Ref Range    WBC 4.0 4.0 - 11.0 10e9/L    RBC Count 3.28 (L) 4.4 - 5.9 10e12/L    Hemoglobin 8.8 (L) 13.3 - 17.7 g/dL    Hematocrit 27.8 (L) 40.0 - 53.0 %    MCV 85 78 - 100 fl    MCH 26.8 26.5 - 33.0 pg    MCHC 31.7 31.5 - 36.5 g/dL    RDW 14.3 10.0 - 15.0 %    Platelet Count 278 150 - 450 10e9/L    Diff Method Automated Method     % Neutrophils 71.3 %    % Lymphocytes 12.9 %    % Monocytes 11.1 %    % Eosinophils 3.7 %    % Basophils 0.5 %    % Immature Granulocytes 0.5 %    Nucleated RBCs 0 0 /100    Absolute Neutrophil 2.9 1.6 - 8.3 10e9/L    Absolute Lymphocytes 0.5 (L) 0.8 - 5.3 10e9/L    Absolute Monocytes 0.5 0.0 - 1.3 10e9/L    Absolute Eosinophils 0.2 0.0 - 0.7 10e9/L    Absolute Basophils 0.0 0.0 - 0.2 10e9/L    Abs Immature Granulocytes 0.0 0 - 0.4 10e9/L    Absolute Nucleated RBC 0.0    Comprehensive metabolic panel    Collection Time: 01/24/17  1:21 PM   Result Value Ref Range    Sodium 134 133 - 144 mmol/L    Potassium 4.4 3.4 - 5.3 mmol/L    Chloride 100 94 - 109 mmol/L    Carbon Dioxide 26 20 - 32 mmol/L    Anion Gap 8 3 - 14 mmol/L    Glucose 61 (L) 70 - 99 mg/dL    Urea Nitrogen 16 7 - 30 mg/dL    Creatinine 0.66 0.66 - 1.25 mg/dL    GFR Estimate >90  Non  GFR Calc   >60 mL/min/1.7m2    GFR Estimate If Black  >90   GFR Calc   >60 mL/min/1.7m2    Calcium 8.9 8.5 - 10.1 mg/dL    Bilirubin Total 0.3 0.2 - 1.3 mg/dL    Albumin 3.1 (L) 3.4 - 5.0 g/dL    Protein Total 8.4 6.8 - 8.8 g/dL    Alkaline Phosphatase 60 40 - 150 U/L    ALT 17 0 - 70 U/L    AST 22 0 - 45 U/L   TSH    Collection Time: 01/24/17  1:21 PM   Result Value Ref Range    TSH 0.87 0.40 - 4.00 mU/L   T4 free    Collection Time: 01/24/17  1:21 PM   Result Value Ref Range    T4 Free 1.34 0.76 - 1.46 ng/dL   Lipase    Collection Time: 01/24/17  1:21 PM   Result Value Ref Range    Lipase 86 73 - 393 U/L   Amylase    Collection Time: 01/24/17  1:21 PM   Result Value Ref Range    Amylase 45 30 - 110 U/L   Magnesium    Collection Time: 01/24/17  1:21 PM   Result Value Ref Range    Magnesium 2.3 1.6 - 2.3 mg/dL   Phosphorus    Collection Time: 01/24/17  1:21 PM   Result Value Ref Range    Phosphorus 4.0 2.5 - 4.5 mg/dL   Lactate Dehydrogenase    Collection Time: 01/24/17  1:21 PM   Result Value Ref Range    Lactate Dehydrogenase 489 (H) 85 - 227 U/L

## 2017-01-24 NOTE — MR AVS SNAPSHOT
After Visit Summary   1/24/2017    Naren Kimble    MRN: [id number]           Patient Information     Date Of Birth          1975        Visit Information        Provider Department      1/24/2017 3:00 PM  23 ATC;  ONCOLOGY INFUSION [facility]        Today's Diagnoses     Non-small cell lung cancer (NSCLC) (H)    -  1       Care Instructions    Contact Numbers    [facility] Main Line: [phone]  [facility] Triage:  [phone]    Call triage with chills and/or temperature greater than or equal to 100.5, uncontrolled nausea/vomiting, diarrhea, constipation, dizziness, shortness of breath, chest pain, bleeding, unexplained bruising, or any new/concerning symptoms, questions/concerns.     If you are having any concerning symptoms or wish to speak to a provider before your next infusion visit, please call your care coordinator or triage to notify them so we can adequately serve you.       After Hours: [phone]    If after hours, weekends, or holidays, call main hospital  and ask for Oncology doctor on call.           January 2017 Sunday Monday Tuesday Wednesday Thursday Friday Saturday   1     2     3     [facility] LAB DRAW    1:30 PM   (15 min.)    MASONIC LAB DRAW   [facility] Lab Draw     [facility] RETURN    2:00 PM   (50 min.)   Yulia Lepe, DANIA CNP   [facility] ONC INFUSION 120    3:00 PM   (120 min.)    ONCOLOGY INFUSION   [facility]     MR BRAIN W    6:00 PM   (60 min.)   UR2   [facility]     MR THORACIC SPINE WWO    7:00 PM   (60 min.)   UR2   [facility]     MR LUMBAR SPINE WWO    8:00 PM   (60 min.)   UR2   [facility], MRI 4     5     6     [facility] LAB DRAW    9:15 AM   (15 min.)    MASONIC LAB DRAW   [facility] Lab Draw 7       8     9     10     [facility] LAB DRAW   10:45 AM   (15 min.)    MAS[facility]IC LAB DRAW   [facility] Lab Draw     [facility] RETURN   11:20  AM   (50 min.)   Yulia Lepe APRN CNP   [facility]    2:00 PM   (120 min.)   [facility] ONCOLOGY INFUSION   [facility] 11     [facility] CONSULT    2:00 PM   (90 min.)   Eden Kovacs MD   [facility]iation Oncology Clinic     [facility] TCT/SIM SUITE    3:00 PM   (60 min.)   Eden Kovacs MD   [facility] 12     13     P TREATMENT PLAN VISIT    7:00 AM   (15 min.)   Eden Kovacs MD   [facility] 14       15     16     [facility] EXTERNAL RADIATION TREATMT    7:30 AM   (15 min.)   [facility] RAD ONC ELEKTA   [facility] On[facility] 17     [facility] EXTERNAL RADIATION TREATMT    7:30 AM   (15 min.)   [facility] RAD ONC ELEKTA   Radiation Oncology Clinic     [facility] LAB DRAW    9:45 AM   (15 min.)    MASONIC LAB DRAW   [facility] Lab Draw     [facility] RETURN   10:00 AM   (30 min.)   Nenita Bartholomew MD   [facility] ONC INFUSION 120    1:30 PM   (120 min.)    ONCOLOGY INFUSION   [facility] 18     [facility] EXTERNAL RADIATION TREATMT    7:30 AM   (15 min.)   [facility] RAD ONC ELEKTA   Radiation Oncology Clinic     [facility] ON TREATMENT VISIT    7:45 AM   (15 min.)   Eden Kovacs MD   [facility] Oncology Clinic 19     [facility] EXTERNAL RADIATION TREATMT    7:30 AM   (15 min.)   [facility] RAD ONC ELEKTA   [facility] Oncology Clinic 20     [facility] EXTERNAL RADIATION TREATMT    7:30 AM   (15 min.)   [facility] RAD ONC ELEKTA   Radiation Oncology Clinic     [facility] NURSE VISIT    8:20 AM   (10 min.)   Nurse,  Oncology   [facility] 21       22     23     24     P [facility] LAB DRAW    1:30 PM   (15 min.)    [facility]   [facility]     [facility] RETURN    2:00 PM   (50 min.)   Yulia Lepe APRN CNP   [facility] 120    3:00 PM   (120 min.)    ONCOLOGY INFUSION   [facility] 25     26     27     28       29     30     31     [facility] MASONIC  LAB DRAW    9:45 AM   (15 min.)   [facility]IC LAB DRAW   [facility] Lab Draw     [facility] RETURN   10:30 AM   (50 min.)   Yulia Lepe, DANIA CNP   [facility] ONC INFUSION 120   12:00 PM   (120 min.)    ONCOLOGY INFUSION   [facility]                                February 2017 Sunday Monday Tuesday Wednesday Thursday Friday Saturday                  1     2     3     4       5     6     PET ONCOLOGY WHOLE BODY    7:30 AM   (45 min.)   UUPET1   [facility] PET CT 7     [facility]    4:00 PM   (15 min.)   [facility] LAB DRAW   [facility] Lab Draw     [facility] RETURN    4:30 PM   (30 min.)   Khris Randle DO [facility] 8     9     10     11       12     13     14     15     16     17     18       19     20     21     22     23  Happy Birthday!     24     25       26     27     28                                      Lab Results:  Recent Results (from the past 12 hour(s))   CBC with platelets differential    Collection Time: 01/24/17  1:21 PM   Result Value Ref Range    WBC 4.0 4.0 - 11.0 10e9/L    RBC Count 3.28 (L) 4.4 - 5.9 10e12/L    Hemoglobin 8.8 (L) 13.3 - 17.7 g/dL    Hematocrit 27.8 (L) 40.0 - 53.0 %    MCV 85 78 - 100 fl    MCH 26.8 26.5 - 33.0 pg    MCHC 31.7 31.5 - 36.5 g/dL    RDW 14.3 10.0 - 15.0 %    Platelet Count 278 150 - 450 10e9/L    Diff Method Automated Method     % Neutrophils 71.3 %    % Lymphocytes 12.9 %    % Monocytes 11.1 %    % Eosinophils 3.7 %    % Basophils 0.5 %    % Immature Granulocytes 0.5 %    Nucleated RBCs 0 0 /100    Absolute Neutrophil 2.9 1.6 - 8.3 10e9/L    Absolute Lymphocytes 0.5 (L) 0.8 - 5.3 10e9/L    Absolute Monocytes 0.5 0.0 - 1.3 10e9/L    Absolute Eosinophils 0.2 0.0 - 0.7 10e9/L    Absolute Basophils 0.0 0.0 - 0.2 10e9/L    Abs Immature Granulocytes 0.0 0 - 0.4 10e9/L    Absolute Nucleated RBC 0.0    Comprehensive metabolic panel    Collection Time: 01/24/17  1:21 PM    Result Value Ref Range    Sodium 134 133 - 144 mmol/L    Potassium 4.4 3.4 - 5.3 mmol/L    Chloride 100 94 - 109 mmol/L    Carbon Dioxide 26 20 - 32 mmol/L    Anion Gap 8 3 - 14 mmol/L    Glucose 61 (L) 70 - 99 mg/dL    Urea Nitrogen 16 7 - 30 mg/dL    Creatinine 0.66 0.66 - 1.25 mg/dL    GFR Estimate >90  Non  GFR Calc   >60 mL/min/1.7m2    GFR Estimate If Black >90   GFR Calc   >60 mL/min/1.7m2    Calcium 8.9 8.5 - 10.1 mg/dL    Bilirubin Total 0.3 0.2 - 1.3 mg/dL    Albumin 3.1 (L) 3.4 - 5.0 g/dL    Protein Total 8.4 6.8 - 8.8 g/dL    Alkaline Phosphatase 60 40 - 150 U/L    ALT 17 0 - 70 U/L    AST 22 0 - 45 U/L   TSH    Collection Time: 01/24/17  1:21 PM   Result Value Ref Range    TSH 0.87 0.40 - 4.00 mU/L   T4 free    Collection Time: 01/24/17  1:21 PM   Result Value Ref Range    T4 Free 1.34 0.76 - 1.46 ng/dL   Lipase    Collection Time: 01/24/17  1:21 PM   Result Value Ref Range    Lipase 86 73 - 393 U/L   Amylase    Collection Time: 01/24/17  1:21 PM   Result Value Ref Range    Amylase 45 30 - 110 U/L   Magnesium    Collection Time: 01/24/17  1:21 PM   Result Value Ref Range    Magnesium 2.3 1.6 - 2.3 mg/dL   Phosphorus    Collection Time: 01/24/17  1:21 PM   Result Value Ref Range    Phosphorus 4.0 2.5 - 4.5 mg/dL   Lactate Dehydrogenase    Collection Time: 01/24/17  1:21 PM   Result Value Ref Range    Lactate Dehydrogenase 489 (H) 85 - 227 U/L               Follow-ups after your visit        Your next 10 appointments already scheduled     Jan 31, 2017  9:45 AM   [facility] Lab Draw with  MASONIC LAB DRAW   [facility] Lab Draw ([facility])    [address] [address] [phone]            Jan 31, 2017 10:30 AM   (Arrive by 10:15 AM)   Return Visit with DANIA Stallworth CNP   [facility] ([facility])    [address]  2nd Floor  [facility]  [address]   [phone]            Jan 31, 2017 12:00 PM   Infusion 120 with UC ONCOLOGY INFUSION, UC 15 ATC   [facility] ([facility])    [address] [place] [id number] [phone]            Feb 06, 2017  7:30 AM   PET ONCOLOGY WHOLE BODY with UUPET1   [facility] [place] PET CT ([facility])    [address] [address] [facility] [id number] [phone]           Tell your doctor:   If there is any chance you may be pregnant or if you are breastfeeding.   If you have problems lying in small spaces (claustrophobia). If you do, your doctor may give you medicine to help you relax. If you have diabetes:   Have your exam early in the morning. Your blood glucose will go up as the day goes by.   Your glucose level must be 180 or less at the start of the exam. Please take any medicines you need to ensure this blood glucose level. 24 hours before your scan: Don t do any heavy exercise. (No jogging, aerobics or other workouts.) Exercise will make your pictures less accurate. 6 hours before your scan:   Stop all food and liquids (except water).   Do not chew gum or suck on mints.   If you need to take medicine with food, you may take it with a few crackers.  Please call your Imaging Department at your exam site with any questions.            Feb 07, 2017  4:00 PM   Masonic Lab Draw with UC MASONIC LAB DRAW   [facility] Lab Draw ([facility])    [address] [place] [id number]   [phone]            Feb 07, 2017  4:30 PM   (Arrive by 4:15 PM)   Return Visit with Khris Randle DO   [facility] ([facility])    [address] [place] [id number] [phone]              Who to contact     If you have questions or need follow up information about today's clinic visit or your  schedule please contact [facility] directly at [phone].  Normal or non-critical lab and imaging results will be communicated to you by MyChart, letter or phone within 4 business days after the clinic has received the results. If you do not hear from us within 7 days, please contact the clinic through [employer] or phone. If you have a critical or abnormal lab result, we will notify you by phone as soon as possible.  Submit refill requests through [employer] or call your pharmacy and they will forward the refill request to us. Please allow 3 business days for your refill to be completed.          Additional Information About Your Visit        [employer] Information     [employer] gives you secure access to your electronic health record. If you see a primary care provider, you can also send messages to your care team and make appointments. If you have questions, please call your primary care clinic.  If you do not have a primary care provider, please call [phone] and they will assist you.        Care EveryWhere ID     This is your Care EveryWhere ID. This could be used by other organizations to access your [place] medical records  [id number]         Blood Pressure from Last 3 Encounters:   01/24/17 130/85   01/17/17 124/76   01/17/17 127/82    Weight from Last 3 Encounters:   01/24/17 114.669 kg (252 lb 12.8 oz)   01/18/17 114.306 kg (252 lb)   01/17/17 115.35 kg (254 lb 4.8 oz)              Today, you had the following     No orders found for display         Today's Medication Changes          These changes are accurate as of: 1/24/17  4:13 PM.  If you have any questions, ask your nurse or doctor.               These medicines have changed or have updated prescriptions.        Dose/Directions    cyclobenzaprine 10 MG tablet   Commonly known as:  FLEXERIL   This may have changed:  Another medication with the same name was removed. Continue taking this medication, and follow the directions you see  here.   Changed by:  Yulia Lepe APRN CNP        Dose:  10 mg   Take 10 mg by mouth   Refills:  0       * PROCHLORPERAZINE MALEATE PO   This may have changed:  Another medication with the same name was added. Make sure you understand how and when to take each.   Changed by:  Nenita Bartholomew MD        Dose:  10 mg   Take 10 mg by mouth every 6 hours as needed for nausea or vomiting   Refills:  0       * prochlorperazine 10 MG tablet   Commonly known as:  COMPAZINE   This may have changed:  You were already taking a medication with the same name, and this prescription was added. Make sure you understand how and when to take each.   Used for:  Non-small cell lung cancer, unspecified laterality (H)   Changed by:  Yulia Lepe APRN CNP        Dose:  10 mg   Take 1 tablet (10 mg) by mouth every 6 hours as needed for nausea or vomiting   Quantity:  60 tablet   Refills:  3       * Notice:  This list has 2 medication(s) that are the same as other medications prescribed for you. Read the directions carefully, and ask your doctor or other care provider to review them with you.         Where to get your medicines      These medications were sent to [place], [place] - [address] [place] [id number]    Hours:  TRANSPLANT PHONE NUMBER [phone] Phone:  [phone]    - ondansetron 8 MG tablet  - prochlorperazine 10 MG tablet      Some of these will need a paper prescription and others can be bought over the counter.  Ask your nurse if you have questions.     Bring a paper prescription for each of these medications    - oxyCODONE 5 MG capsule             Primary Care Provider    None Specified       No primary provider on file.        Thank you!     Thank you for choosing [facility]  for your care. Our goal is always to provide you with excellent care. Hearing back from our patients is one way we can continue to improve  our services. Please take a few minutes to complete the written survey that you may receive in the mail after your visit with us. Thank you!             Your Updated Medication List - Protect others around you: Learn how to safely use, store and throw away your medicines at www.disposemymeds.org.          This list is accurate as of: 1/24/17  4:13 PM.  Always use your most recent med list.                   Brand Name Dispense Instructions for use    cyclobenzaprine 10 MG tablet    FLEXERIL     Take 10 mg by mouth       diphenhydrAMINE 25 MG tablet    BENADRYL    50 tablet    Take 1-2 tablets (25-50 mg) by mouth every 6 hours as needed for itching or allergies       famotidine 10 MG tablet    PEPCID    20 tablet    Take 2 tablets (20 mg) by mouth 2 times daily       fentaNYL 25 mcg/hr 72 hr patch    DURAGESIC    10 patch    Place 1 patch onto the skin every 72 hours       FOLIC ACID PO      Take 800 mcg by mouth daily       levETIRAcetam 100 MG/ML solution    KEPPRA     Take 10 mg/kg by mouth 2 times daily       lidocaine 5 % Patch    LIDODERM    30 patch    Apply up to 3 patches to painful area at once for up to 12 h within a 24 h period.  Remove after 12 hours.       loratadine 10 MG tablet    CLARITIN    30 tablet    Take 1 tablet (10 mg) by mouth daily       ondansetron 8 MG tablet    ZOFRAN    60 tablet    Take 1 tablet (8 mg) by mouth every 8 hours as needed       oxyCODONE 5 MG capsule    OXY-IR    100 capsule    Take 1 capsule (5 mg) by mouth every 4 hours as needed for moderate to severe pain       * PROCHLORPERAZINE MALEATE PO      Take 10 mg by mouth every 6 hours as needed for nausea or vomiting       * prochlorperazine 10 MG tablet    COMPAZINE    60 tablet    Take 1 tablet (10 mg) by mouth every 6 hours as needed for nausea or vomiting       TYLENOL PO      Take 500 mg by mouth daily as needed       * Notice:  This list has 2 medication(s) that are the same as other medications prescribed for you. Read  the directions carefully, and ask your doctor or other care provider to review them with you.

## 2017-01-24 NOTE — PROGRESS NOTES
Naren Kimble is a 41 year old man with metastatic NSCLC, seen for evaluation prior to c2, d22 ALT-803  Nivolumab.    Oncology HPI:    Stage IV non-small cell lung cancer.  Initially presented in 07/2016 with a brain mass, which was resected and was consistent with metastatic adenocarcinoma of the lung. He was treated with stereotactic brain radiation to the tumor bed.  Negative EGFR and ALK rearrangement studies.   ROS1 studies are not known.  CT scan in 07/2016 did not have any definitive mass in the lung.  PET/CT 08/2015 showed 1 cm mass in the left lower lobe.  Bronchoscopy was negative with the [facility] system.  He proceeded with cisplatin and Alimta after stereotactic radiation to resection bed and started chemotherapy 09/19/2016.  Imaging after 2 cycles of chemotherapy were consistent with metastatic disease in the bone.  He was started on Zometa. He progressed on cisplatin/alimta and was started on a clinical trial with ALT-803 plus nivolumab. He had increased edema, fevers and skin rash with the first 2 treatments. Because of this he only received Nivolumab on day 15. He proceeded with subsequent treatment without further difficulty. 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 He was initiated on palliative radiation to the T spine, sacrum and left hip 1/16/17- 1/20/17.      Interval history:   Valerio is having a flare in the left hip pain today. He is using ibuprofen with some improvement. Takes 5 mg of oxycodone every 4 hours as needed. Doesn't feel that he needs a higher dose. Was due for a fentanyl patch change last night. Took the patch off, but didn't put a new one on. Has some occasional right thumb and bilateral thigh tingling. Was present prior to radiation and has not changed. Has some constipation, well controlled with senna and MOM. Urination is normal. No fevers/chills. No cough or shortness of breath. No headaches/vision  "changes. Has noted some difficulty swallowing--feels like reflux is adding up. He attributed this to radiation as it was in the area that was radiated. Still able to swallow pills and eat.    Exam: alert, appears in NAD. Blood pressure 130/85, pulse 100, temperature 97  F (36.1  C), temperature source Oral, resp. rate 20, height 1.88 m (6' 2\"), weight 114.669 kg (252 lb 12.8 oz), SpO2 100 %.  Oropharynx is moist, no focal lesion. No icterus. Neck supple and without adenopathy. Lungs:CTA. Heart:RRR, no murmur or rub. Abdomen: soft, nontender, BS active. No organomegaly. Extremities: warm, no edema.   ECOG 1-2    Results for OBDULIA CHIANG (MRN [id number]) as of 1/24/2017 13:59   Ref. Range 1/24/2017 13:21   Sodium Latest Ref Range: 133-144 mmol/L 134   Potassium Latest Ref Range: 3.4-5.3 mmol/L 4.4   Chloride Latest Ref Range:  mmol/L 100   Carbon Dioxide Latest Ref Range: 20-32 mmol/L 26   Urea Nitrogen Latest Ref Range: 7-30 mg/dL 16   Creatinine Latest Ref Range: 0.66-1.25 mg/dL 0.66   GFR Estimate Latest Ref Range: >60 mL/min/1.7m2 >90...   GFR Estimate If Black Latest Ref Range: >60 mL/min/1.7m2 >90...   Calcium Latest Ref Range: 8.5-10.1 mg/dL 8.9   Anion Gap Latest Ref Range: 3-14 mmol/L 8   Magnesium Latest Ref Range: 1.6-2.3 mg/dL 2.3   Phosphorus Latest Ref Range: 2.5-4.5 mg/dL 4.0   Albumin Latest Ref Range: 3.4-5.0 g/dL 3.1 (L)   Protein Total Latest Ref Range: 6.8-8.8 g/dL 8.4   Bilirubin Total Latest Ref Range: 0.2-1.3 mg/dL 0.3   Alkaline Phosphatase Latest Ref Range:  U/L 60   ALT Latest Ref Range: 0-70 U/L 17   AST Latest Ref Range: 0-45 U/L 22   Amylase Latest Ref Range:  U/L 45   Lactate Dehydrogenase Latest Ref Range:  U/L 489 (H)   Lipase Latest Ref Range:  U/L 86   T4 Free Latest Ref Range: 0.76-1.46 ng/dL 1.34   TSH Latest Ref Range: 0.40-4.00 mU/L 0.87   Glucose Latest Ref Range: 70-99 mg/dL 61 (L)   WBC Latest Ref Range: 4.0-11.0 10e9/L 4.0   Hemoglobin Latest " Ref Range: 13.3-17.7 g/dL 8.8 (L)   Hematocrit Latest Ref Range: 40.0-53.0 % 27.8 (L)   Platelet Count Latest Ref Range: 150-450 10e9/L 278   RBC Count Latest Ref Range: 4.4-5.9 10e12/L 3.28 (L)   MCV Latest Ref Range:  fl 85   MCH Latest Ref Range: 26.5-33.0 pg 26.8   MCHC Latest Ref Range: 31.5-36.5 g/dL 31.7   RDW Latest Ref Range: 10.0-15.0 % 14.3   Diff Method Unknown Automated Method   % Neutrophils Latest Units: % 71.3   % Lymphocytes Latest Units: % 12.9   % Monocytes Latest Units: % 11.1   % Eosinophils Latest Units: % 3.7   % Basophils Latest Units: % 0.5   % Immature Granulocytes Latest Units: % 0.5   Nucleated RBCs Latest Ref Range: 0 /100 0   Absolute Neutrophil Latest Ref Range: 1.6-8.3 10e9/L 2.9   Absolute Lymphocytes Latest Ref Range: 0.8-5.3 10e9/L 0.5 (L)   Absolute Monocytes Latest Ref Range: 0.0-1.3 10e9/L 0.5   Absolute Eosinophils Latest Ref Range: 0.0-0.7 10e9/L 0.2   Absolute Basophils Latest Ref Range: 0.0-0.2 10e9/L 0.0   Abs Immature Granulocytes Latest Ref Range: 0-0.4 10e9/L 0.0   Absolute Nucleated RBC Unknown 0.0     Impression/plan:    1. 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 Tolerating treatment well. Has a pain flare in the left hip today, but stopped his fentanyl patch last night. Recommended resuming that now. Will have restaging after this cycle and will follow with Dr. Bartholomew  -Will proceed with cycle 2, D22 today, has weekly f/u while on the study.    2. Normochromic, normocytic anemia: could be related to recent radiation. No s/s of bleeding. No over s/s of anemia except for some fatigue. Will monitor symptoms.    3. Back pain-has diffuse metastatic disease. An area of concern is the T2-3 vertebra with mild spinal canal and moderat right neurla foraminal stenosis. Had abutment of the cord without abnormal cord signal.   No new symptoms suggesting cord compression. Stable right  thumb and bilateral thigh tingling.  -Completed palliative radiation to the T spine, sacrum and left hip 1/16/17- 1/20/17.  -Pain is flaring today. lIkely related to stopping the fentanyl patch. He should resume that now at 25 mcg every 72 hours. OK to take oxycodone 5 mg every 4 hours as needed. Refill given today. Alternates tylenol with ibuprofen. Max dose of ibuprofen of 2400 mg/day per study.  -Due for zometa next week.    4. Brain metastasis, s/p resection, stereotactic radiation on 9/16/16: stable on MRI from 1/3/17.    5. Constipation: Opioid related. Continue Senna/ MOM as needed.

## 2017-01-24 NOTE — NURSING NOTE
Performed vitals on patient in clinic for research.   Pre dose vitals were taken at 3:40 p.m.  T- 98.5  P- 91  R- 18  BP- 137/84    Post dose vitals were taken:  15 minutes post at 4:00 p.m.  T- 99.0  P-107  R-18  BP-132/80    30 minutes post were at 4:15 p.m.  T- 98.8  P- 107  R- 18  BP- 131/75    45 minutes post were at 4:30 p.m.  T-98.8  P-102  R-18  BP-129/72    60 minutes post dose were at 4:45 p.m.  T-99.0  P-101  R-18  BP-139/89    120 minutes post dose were at 5:30 p.m.  T-97.3  P-113  R-18  BP-136/86  Shana Motley, CMA

## 2017-01-24 NOTE — Clinical Note
1/24/2017       RE: Naren Kimble  [address]  [facility] [address]     Dear Colleague,    Thank you for referring your patient, Naren Kimble, to the [facility]. Please see a copy of my visit note below.    Naren Kimble is a 41 year old man with metastatic NSCLC, seen for evaluation prior to c2, d22 ALT-803  Nivolumab.    Oncology HPI:    Stage IV non-small cell lung cancer.  Initially presented in 07/2016 with a brain mass, which was resected and was consistent with metastatic adenocarcinoma of the lung. He was treated with stereotactic brain radiation to the tumor bed.  Negative EGFR and ALK rearrangement studies.   ROS1 studies are not known.  CT scan in 07/2016 did not have any definitive mass in the lung.  PET/CT 08/2015 showed 1 cm mass in the left lower lobe.  Bronchoscopy was negative with the [facility] system.  He proceeded with cisplatin and Alimta after stereotactic radiation to resection bed and started chemotherapy 09/19/2016.  Imaging after 2 cycles of chemotherapy were consistent with metastatic disease in the bone.  He was started on Zometa. He progressed on cisplatin/alimta and was started on a clinical trial with ALT-803 plus nivolumab. He had increased edema, fevers and skin rash with the first 2 treatments. Because of this he only received Nivolumab on day 15. He proceeded with subsequent treatment without further difficulty. 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 He was initiated on palliative radiation to the T spine, sacrum and left hip 1/16/17- 1/20/17.      Interval history:   Valerio is having a flare in the left hip pain today. He is using ibuprofen with some improvement. Takes 5 mg of oxycodone every 4 hours as needed. Doesn't feel that he needs a higher dose. Was due for a fentanyl patch change last night. Took the patch off, but didn't put a new one on. Has some occasional right thumb  and bilateral thigh tingling. Was present prior to radiation and has not changed. Has some constipation, well controlled with senna and MOM. Urination is normal. No fevers/chills. No cough or shortness of breath. No headaches/vision changes. Has noted some difficulty swallowing--feels like reflux is adding up. He attributed this to radiation as it was in the area that was radiated. Still able to swallow pills and eat.    Results for OBDULIA CHIANG (MRN [id number]) as of 1/24/2017 13:59   Ref. Range 1/24/2017 13:21   Sodium Latest Ref Range: 133-144 mmol/L 134   Potassium Latest Ref Range: 3.4-5.3 mmol/L 4.4   Chloride Latest Ref Range:  mmol/L 100   Carbon Dioxide Latest Ref Range: 20-32 mmol/L 26   Urea Nitrogen Latest Ref Range: 7-30 mg/dL 16   Creatinine Latest Ref Range: 0.66-1.25 mg/dL 0.66   GFR Estimate Latest Ref Range: >60 mL/min/1.7m2 >90...   GFR Estimate If Black Latest Ref Range: >60 mL/min/1.7m2 >90...   Calcium Latest Ref Range: 8.5-10.1 mg/dL 8.9   Anion Gap Latest Ref Range: 3-14 mmol/L 8   Magnesium Latest Ref Range: 1.6-2.3 mg/dL 2.3   Phosphorus Latest Ref Range: 2.5-4.5 mg/dL 4.0   Albumin Latest Ref Range: 3.4-5.0 g/dL 3.1 (L)   Protein Total Latest Ref Range: 6.8-8.8 g/dL 8.4   Bilirubin Total Latest Ref Range: 0.2-1.3 mg/dL 0.3   Alkaline Phosphatase Latest Ref Range:  U/L 60   ALT Latest Ref Range: 0-70 U/L 17   AST Latest Ref Range: 0-45 U/L 22   Amylase Latest Ref Range:  U/L 45   Lactate Dehydrogenase Latest Ref Range:  U/L 489 (H)   Lipase Latest Ref Range:  U/L 86   T4 Free Latest Ref Range: 0.76-1.46 ng/dL 1.34   TSH Latest Ref Range: 0.40-4.00 mU/L 0.87   Glucose Latest Ref Range: 70-99 mg/dL 61 (L)   WBC Latest Ref Range: 4.0-11.0 10e9/L 4.0   Hemoglobin Latest Ref Range: 13.3-17.7 g/dL 8.8 (L)   Hematocrit Latest Ref Range: 40.0-53.0 % 27.8 (L)   Platelet Count Latest Ref Range: 150-450 10e9/L 278   RBC Count Latest Ref Range: 4.4-5.9 10e12/L 3.28  (L)   MCV Latest Ref Range:  fl 85   MCH Latest Ref Range: 26.5-33.0 pg 26.8   MCHC Latest Ref Range: 31.5-36.5 g/dL 31.7   RDW Latest Ref Range: 10.0-15.0 % 14.3   Diff Method Unknown Automated Method   % Neutrophils Latest Units: % 71.3   % Lymphocytes Latest Units: % 12.9   % Monocytes Latest Units: % 11.1   % Eosinophils Latest Units: % 3.7   % Basophils Latest Units: % 0.5   % Immature Granulocytes Latest Units: % 0.5   Nucleated RBCs Latest Ref Range: 0 /100 0   Absolute Neutrophil Latest Ref Range: 1.6-8.3 10e9/L 2.9   Absolute Lymphocytes Latest Ref Range: 0.8-5.3 10e9/L 0.5 (L)   Absolute Monocytes Latest Ref Range: 0.0-1.3 10e9/L 0.5   Absolute Eosinophils Latest Ref Range: 0.0-0.7 10e9/L 0.2   Absolute Basophils Latest Ref Range: 0.0-0.2 10e9/L 0.0   Abs Immature Granulocytes Latest Ref Range: 0-0.4 10e9/L 0.0   Absolute Nucleated RBC Unknown 0.0     Impression/plan:    1. 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 Tolerating treatment well. Has a pain flare in the left hip today, but stopped his fentanyl patch last night. Recommended resuming that now. Will have restaging after this cycle and will follow with Dr. Bartholomew  -Will proceed with cycle 2, D22 today, has weekly f/u while on the study.    2. Normochromic, normocytic anemia: could be related to recent radiation. No s/s of bleeding. No over s/s of anemia except for some fatigue. Will monitor symptoms.    3. Back pain-has diffuse metastatic disease. An area of concern is the T2-3 vertebra with mild spinal canal and moderat right neurla foraminal stenosis. Had abutment of the cord without abnormal cord signal.   No new symptoms suggesting cord compression. Stable right thumb and bilateral thigh tingling.  -Completed palliative radiation to the T spine, sacrum and left hip 1/16/17- 1/20/17.  -Pain is flaring today. lIkely related to stopping the fentanyl  patch. He should resume that now at 25 mcg every 72 hours. OK to take oxycodone 5 mg every 4 hours as needed. Refill given today. Alternates tylenol with ibuprofen. Max dose of ibuprofen of 2400 mg/day per study.  -Due for zometa next week.    4. Brain metastasis, s/p resection, stereotactic radiation on 9/16/16: stable on MRI from 1/3/17.    5. Constipation: Opioid related. Continue Senna/ MOM as needed.      Again, thank you for allowing me to participate in the care of your patient.      Sincerely,    DANIA Greenfield CNP

## 2017-01-24 NOTE — NURSING NOTE
Chief Complaint   Patient presents with     Blood Draw     Vital signs done, small-cell lung cancer     Vitals and labs done peripherally.  See doc flow sheets for details.

## 2017-01-24 NOTE — NURSING NOTE
Chief Complaint   Patient presents with     Blood Draw     labs draw from right arm and vitals taken by ALEX      Large cuff was used for blood pressure check.     Erika Avitia CMA January 24, 2017

## 2017-01-24 NOTE — PROGRESS NOTES
"Infusion Nursing Note:  Patient presents today for Cycle 2 Day 22 Study ALT-803 (IDS# 4909).    Patient seen by provider today: Yes: Yulia Lepe NP    Note: N/A.    Intravenous Access:  None.    Treatment Conditions:  HGB      8.8   1/24/2017  WBC      4.0   1/24/2017   ANEU      2.9   1/24/2017  PLT      278   1/24/2017     NA      134   1/24/2017                POTASSIUM      4.4   1/24/2017        MAG      2.3   1/24/2017         CR     0.66   1/24/2017                ROSENDO      8.9   1/24/2017             BILITOTAL      0.3   1/24/2017        ALBUMIN      3.1   1/24/2017                 ALT       17   1/24/2017        AST       22   1/24/2017  Results reviewed, labs MET treatment parameters, ok to proceed with treatment.    Post Infusion Assessment:  Study ALT-803 (IDS# 4909) Injection Investigational CHEMOTHERAPY 1,720 mcg given at 1542 into R abdomen in two injection sites approx 1\" apart. Unable to chart medication into MAR. Double check performed with Juliette RESTREPO RN.  VS obtained every 15 minutes for 2 hours per protocol from Vick Valedz CMA. No s/s of reactions  Reviewed with patient.  Given biologic medication or medication hand-out. Inform patient if any fever, chills or signs of infection, new symptoms, abdominal pain, heart palpitations, shortness of breath, reaction, weakness, neurological changes, seek medical attention immediately and should not receive infusions. No live virus vaccines prior to or during treatment or up to 6 months post infusion. If the patient has an upcoming procedure or surgery, this should be discussed with the rheumatologist and surgeon or provider.    Discharge Plan:   Prescription refills given for Oxycodone, Compazine, Zofran.  Discharge instructions reviewed with: Patient.  Patient and/or family verbalized understanding of discharge instructions and all questions answered.  Copy of AVS reviewed with patient and/or family.  Patient will return 1/31 for next " appointment.  Patient discharged in stable condition accompanied by: self and wife.  Departure Mode: Ambulatory.  Face to Face time: 0 minutes.    Jhonatan Rojas RN.

## 2017-01-31 NOTE — NURSING NOTE
Chief Complaint   Patient presents with     Blood Draw     Labs collected peripherally by RN.      Labs collected via venipuncture.   Checked in for provider visit.   Sharri Fleming RN

## 2017-01-31 NOTE — PROGRESS NOTES
"If you have questions, please contact:  Cherrie Clements  Coordinator Phone (Mobile):  [phone]  Coordinator Cell/Pager:  [phone]                       Comments:    Research RN met with subject for C2W5 for weekly trial therapy.      Patient continues to have lower back pain and hip pain rating 5/10 today with numbness and \"dull ache\" in bilateral outer thigh region and R-thumb.  Continues to use fentenyl patch, oxycodone, tylenol and ibuprofen regularly.  Finished palliative radiation on 1/20.  Able to  son's basketball team this weekend.    Patient reports a \"hard spot\" from injection area las week lasting \"2 to 3 days\".  No other symptoms reported.    RN reviewed currently medications and updated clinic staff with changes as well.  Labs reviewed and will continue with trial treatment.  Imaging and restaging is scheduled for next week.        "

## 2017-01-31 NOTE — PATIENT INSTRUCTIONS
Contact Numbers    [facility] Main Line: [phone]  [facility] Triage:  [phone]    Call triage with chills and/or temperature greater than or equal to 100.5, uncontrolled nausea/vomiting, diarrhea, constipation, dizziness, shortness of breath, chest pain, bleeding, unexplained bruising, or any new/concerning symptoms, questions/concerns.     If you are having any concerning symptoms or wish to speak to a provider before your next infusion visit, please call your care coordinator or triage to notify them so we can adequately serve you.       After Hours: [phone]    If after hours, weekends, or holidays, call main hospital  and ask for Oncology doctor on call.         January 2017 Sunday Monday Tuesday Wednesday Thursday Friday Saturday   1     2     3     [facility] LAB DRAW    1:30 PM   (15 min.)    [facility] LAB DRAW   [facility] Lab Draw     [facility] RETURN    2:00 PM   (50 min.)   Yulia Lepe APRN CNP   [facility] ONC INFUSION 120    3:00 PM   (120 min.)    ONCOLOGY INFUSION   [facility]     MR BRAIN W    6:00 PM   (60 min.)   UR2   [facility], [facility]     MR THORACIC SPINE WWO    7:00 PM   (60 min.)   UR2   [facility], [facility]     MR LUMBAR SPINE WWO    8:00 PM   (60 min.)   UR2   [facility], MRI 4     5     6     P [facility] LAB DRAW    9:15 AM   (15 min.)    [facility]   [facility] 7       8     9     10     [facility] DRAW   10:45 AM   (15 min.)    [facility] LAB DRAW   [facility] Lab Draw     [facility] RETURN   11:20 AM   (50 min.)   Yulia Lepe APRN CNP   [facility] ONC INFUSION 120    2:00 PM   (120 min.)    ONCOLOGY INFUSION   [facility] 11     P CONSULT    2:00 PM   (90 min.)   Eden Kovacs MD   Radiation Oncology Clinic     [facility] TCT/SIM SUITE    3:00 PM   (60 min.)   Eden Kovacs MD   [facility] Oncology Clinic 12     13      [facility] TREATMENT PLAN VISIT    7:00 AM   (15 min.)   Eden Kovacs MD   [facility] 14       15     16     [facility] EXTERNAL RADIATION TREATMT    7:30 AM   (15 min.)   P RAD ONC ELEKTA   [facility] Oncology [facility] 17     [facility] EXTERNAL RADIATION TREATMT    7:30 AM   (15 min.)   P RAD ONC ELEKTA   Radiation Oncology Clinic     [facility] DRAW    9:45 AM   (15 min.)   [facility] DRAW   [facility] Draw     [facility] RETURN   10:00 AM   (30 min.)   Nenita Bartholomew MD   [facility]     [facility] ONC INFUSION 120    1:30 PM   (120 min.)    ONCOLOGY INFUSION   [facility] 18     [facility] EXTERNAL RADIATION TREATMT    7:30 AM   (15 min.)   [facility] RAD ONC ELEKTA   Radiation Oncology Clinic     [facility] ON TREATMENT VISIT    7:45 AM   (15 min.)   Eden Kovacs MD   [facility] Oncology Clinic 19     [facility] EXTERNAL RADIATION TREATMT    7:30 AM   (15 min.)   [facility] RAD ONC ELEKTA   Radiation Oncology Clinic 20     [facility] EXTERNAL RADIATION TREATMT    7:30 AM   (15 min.)   [facility] RAD ONC ELEKTA   Radiation Oncology Clinic     [facility] NURSE VISIT    8:20 AM   (10 min.)   Nurse,  Oncology   [facility] 21       22     23     24     [facility] DRAW    1:30 PM   (15 min.)   [facility] DRAW   [facility] Lab Draw     [facility] RETURN    2:00 PM   (50 min.)   Yulia Lepe APRN CNP   [facility]     [facility] ONC INFUSION 120    3:00 PM   (120 min.)    ONCOLOGY INFUSION   [facility] 25     26     27     28       29     30     31     [facility] DRAW    9:45 AM   (15 min.)   [facility] DRAW   [facility] Lab Draw     [facility] RETURN   10:30 AM   (50 min.)   Yulia Lepe APRN CNP   [facility]     [facility] ONC INFUSION 120   12:00 PM   (120 min.)    ONCOLOGY INFUSION   [facility]                                February 2017 Sunday Monday Tuesday Wednesday Thursday Friday Saturday                   1     2     3     4       5     6     PET ONCOLOGY WHOLE BODY    7:30 AM   (45 min.)   UUPET1   [facility] [place] PET CT 7     [facility] LAB DRAW    3:15 PM   (15 min.)    [facility] DRAW   [facility]     [facility] RETURN    3:45 PM   (30 min.)   Nenita Bartholomew MD   [facility] 8     9     10     11       12     13     14     15     16     17     18       19     20     21     22     23  Happy Birthday!     24     25       26     27     28                                     Recent Results (from the past 24 hour(s))   CBC with platelets differential    Collection Time: 01/31/17  9:57 AM   Result Value Ref Range    WBC 4.4 4.0 - 11.0 10e9/L    RBC Count 3.39 (L) 4.4 - 5.9 10e12/L    Hemoglobin 9.0 (L) 13.3 - 17.7 g/dL    Hematocrit 28.6 (L) 40.0 - 53.0 %    MCV 84 78 - 100 fl    MCH 26.5 26.5 - 33.0 pg    MCHC 31.5 31.5 - 36.5 g/dL    RDW 14.5 10.0 - 15.0 %    Platelet Count 201 150 - 450 10e9/L    Diff Method Automated Method     % Neutrophils 73.0 %    % Lymphocytes 11.3 %    % Monocytes 10.2 %    % Eosinophils 4.5 %    % Basophils 0.5 %    % Immature Granulocytes 0.5 %    Nucleated RBCs 0 0 /100    Absolute Neutrophil 3.2 1.6 - 8.3 10e9/L    Absolute Lymphocytes 0.5 (L) 0.8 - 5.3 10e9/L    Absolute Monocytes 0.5 0.0 - 1.3 10e9/L    Absolute Eosinophils 0.2 0.0 - 0.7 10e9/L    Absolute Basophils 0.0 0.0 - 0.2 10e9/L    Abs Immature Granulocytes 0.0 0 - 0.4 10e9/L    Absolute Nucleated RBC 0.0    Comprehensive metabolic panel    Collection Time: 01/31/17  9:57 AM   Result Value Ref Range    Sodium 134 133 - 144 mmol/L    Potassium 4.2 3.4 - 5.3 mmol/L    Chloride 100 94 - 109 mmol/L    Carbon Dioxide 26 20 - 32 mmol/L    Anion Gap 8 3 - 14 mmol/L    Glucose 95 70 - 99 mg/dL    Urea Nitrogen 13 7 - 30 mg/dL    Creatinine 0.66 0.66 - 1.25 mg/dL    GFR Estimate >90  Non  GFR Calc   >60 mL/min/1.7m2    GFR Estimate If Black >90   GFR Calc   >60  mL/min/1.7m2    Calcium 9.1 8.5 - 10.1 mg/dL    Bilirubin Total 0.3 0.2 - 1.3 mg/dL    Albumin 3.0 (L) 3.4 - 5.0 g/dL    Protein Total 8.4 6.8 - 8.8 g/dL    Alkaline Phosphatase 58 40 - 150 U/L    ALT 17 0 - 70 U/L    AST 20 0 - 45 U/L   Magnesium    Collection Time: 01/31/17  9:57 AM   Result Value Ref Range    Magnesium 1.9 1.6 - 2.3 mg/dL   Phosphorus    Collection Time: 01/31/17  9:57 AM   Result Value Ref Range    Phosphorus 3.9 2.5 - 4.5 mg/dL   Lactate Dehydrogenase    Collection Time: 01/31/17  9:57 AM   Result Value Ref Range    Lactate Dehydrogenase 402 (H) 85 - 227 U/L

## 2017-01-31 NOTE — PROGRESS NOTES
Naren Kimble is a 41 year old man with metastatic NSCLC, seen for evaluation prior to c2, d29 ALT-803  Nivolumab.    Oncology HPI:    Stage IV non-small cell lung cancer.  Initially presented in 07/2016 with a brain mass, which was resected and was consistent with metastatic adenocarcinoma of the lung. He was treated with stereotactic brain radiation to the tumor bed.  Negative EGFR and ALK rearrangement studies.   ROS1 studies are not known.  CT scan in 07/2016 did not have any definitive mass in the lung.  PET/CT 08/2015 showed 1 cm mass in the left lower lobe.  Bronchoscopy was negative with the [facility] system.  He proceeded with cisplatin and Alimta after stereotactic radiation to resection bed and started chemotherapy 09/19/2016.  Imaging after 2 cycles of chemotherapy were consistent with metastatic disease in the bone.  He was started on Zometa. He progressed on cisplatin/alimta and was started on a clinical trial with ALT-803 plus nivolumab. He had increased edema, fevers and skin rash with the first 2 treatments. Because of this he only received Nivolumab on day 15. He proceeded with subsequent treatment without further difficulty. 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 He was initiated on palliative radiation to the T spine, sacrum and left hip 1/16/17- 1/20/17.    Interval history: Valerio is feeling pretty well. Minimal side effects from the treatment. Energy is fairly good. Is keeping up with household activities. Was able to  basketball this weekend. Appetite is good. No nausea/vomiting/reflux. Bowel/bladder function stable. Has a little more hesitancy while on oxycodone. Pain in the groin is better. Resumed fentanyl after his last visit. Has some low back pain. Using oxycodone with alternating doses of tylenol/ibuprofen. Still has some light numbness on the anterior thighs and right thumb. Unchanged for the past few  weeks. No change to leg strength. Left leg lifting is limited by pain.    Exam: alert, appears well. Blood pressure 128/88, pulse 90, temperature 97.5  F (36.4  C), temperature source Oral, resp. rate 16, weight 113.172 kg (249 lb 8 oz), SpO2 98 %.  Oropharynx is moist, no focal lesion. No icterus. Neck supple and without adenopathy. Lungs:CTA. Heart:RRR, no murmur or rub. Abdomen: soft, nontender, BS active. No masses or organomegaly. Extremities: warm, no edema. Speech is clear. CN wnl. Gait stable. Strength: BUE 5/5, LLE 4-5/5, RLE 5/5.     Labs:Results for OBDULIA CHIANG (MRN [id number]) as of 1/31/2017 10:31   Ref. Range 1/31/2017 09:57   Sodium Latest Ref Range: 133-144 mmol/L 134   Potassium Latest Ref Range: 3.4-5.3 mmol/L 4.2   Chloride Latest Ref Range:  mmol/L 100   Carbon Dioxide Latest Ref Range: 20-32 mmol/L 26   Urea Nitrogen Latest Ref Range: 7-30 mg/dL 13   Creatinine Latest Ref Range: 0.66-1.25 mg/dL 0.66   GFR Estimate Latest Ref Range: >60 mL/min/1.7m2 >90...   GFR Estimate If Black Latest Ref Range: >60 mL/min/1.7m2 >90...   Calcium Latest Ref Range: 8.5-10.1 mg/dL 9.1   Anion Gap Latest Ref Range: 3-14 mmol/L 8   Magnesium Latest Ref Range: 1.6-2.3 mg/dL 1.9   Phosphorus Latest Ref Range: 2.5-4.5 mg/dL 3.9   Albumin Latest Ref Range: 3.4-5.0 g/dL 3.0 (L)   Protein Total Latest Ref Range: 6.8-8.8 g/dL 8.4   Bilirubin Total Latest Ref Range: 0.2-1.3 mg/dL 0.3   Alkaline Phosphatase Latest Ref Range:  U/L 58   ALT Latest Ref Range: 0-70 U/L 17   AST Latest Ref Range: 0-45 U/L 20   Lactate Dehydrogenase Latest Ref Range:  U/L 402 (H)   Glucose Latest Ref Range: 70-99 mg/dL 95   WBC Latest Ref Range: 4.0-11.0 10e9/L 4.4   Hemoglobin Latest Ref Range: 13.3-17.7 g/dL 9.0 (L)   Hematocrit Latest Ref Range: 40.0-53.0 % 28.6 (L)   Platelet Count Latest Ref Range: 150-450 10e9/L 201   RBC Count Latest Ref Range: 4.4-5.9 10e12/L 3.39 (L)   MCV Latest Ref Range:  fl 84   MCH  Latest Ref Range: 26.5-33.0 pg 26.5   MCHC Latest Ref Range: 31.5-36.5 g/dL 31.5   RDW Latest Ref Range: 10.0-15.0 % 14.5   Diff Method Unknown Automated Method   % Neutrophils Latest Units: % 73.0   % Lymphocytes Latest Units: % 11.3   % Monocytes Latest Units: % 10.2   % Eosinophils Latest Units: % 4.5   % Basophils Latest Units: % 0.5   % Immature Granulocytes Latest Units: % 0.5   Nucleated RBCs Latest Ref Range: 0 /100 0   Absolute Neutrophil Latest Ref Range: 1.6-8.3 10e9/L 3.2   Absolute Lymphocytes Latest Ref Range: 0.8-5.3 10e9/L 0.5 (L)   Absolute Monocytes Latest Ref Range: 0.0-1.3 10e9/L 0.5   Absolute Eosinophils Latest Ref Range: 0.0-0.7 10e9/L 0.2   Absolute Basophils Latest Ref Range: 0.0-0.2 10e9/L 0.0   Abs Immature Granulocytes Latest Ref Range: 0-0.4 10e9/L 0.0   Absolute Nucleated RBC Unknown 0.0       Impression/plan:  1. 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 -Continues to tolerate treatment well.  Bone pain is a little better in some areas, but may be worsening in the sternum/low back. Overall, feels well controlled with oxycodone, tylenol/ibuprofen in alternating doses and Fentanyl patch  Will have restaging after this cycle and will follow with Dr. Bartholomew next week  -Will proceed with cycle 2, D29 today, has weekly f/u while on the study.    2. Normochromic, normocytic anemia:  related to disease +/- radiation effect. Stable this week.      3. Back pain-has diffuse metastatic disease. An area of concern is the T2-3 vertebra with mild spinal canal and moderat right neurla foraminal stenosis. Had abutment of the cord without abnormal cord signal.   - Stable right thumb and bilateral thigh tingling.    -Completed palliative radiation to the T spine, sacrum and   left hip 1/16/17- 1/20/17.    -Pain improved/stable today.Continue fentanyl patch 25 mcg.Oxycodone 5 mg every 4 hours as needed.  Alternates  tylenol with ibuprofen. Max dose of ibuprofen of 2400 mg/day per study.  -Due for zometa today.    4. Brain metastasis, s/p resection, stereotactic radiation on 9/16/16: stable on MRI from 1/3/17.    5. Constipation: Opioid related. Improved with Senna/ MOM as needed.

## 2017-01-31 NOTE — MR AVS SNAPSHOT
After Visit Summary   1/31/2017    Naren Kimble    MRN: [id number]           Patient Information     Date Of Birth          1975        Visit Information        Provider Department      1/31/2017 10:30 AM Yulia Lepe APRN CNP [facility]        Today's Diagnoses     Non-small cell lung cancer (NSCLC) (H)    -  1        Follow-ups after your visit        Your next 10 appointments already scheduled     Jan 31, 2017 12:00 PM   Infusion 120 with  ONCOLOGY INFUSION,  15 ATC   [facility] ([facility])    [address] [place] [id number] [phone]            Feb 06, 2017  7:30 AM   PET ONCOLOGY WHOLE BODY with UUPET1   [facility] [place] PET CT ([facility])    [address] [id number] [phone]           Tell your doctor:   If there is any chance you may be pregnant or if you are breastfeeding.   If you have problems lying in small spaces (claustrophobia). If you do, your doctor may give you medicine to help you relax. If you have diabetes:   Have your exam early in the morning. Your blood glucose will go up as the day goes by.   Your glucose level must be 180 or less at the start of the exam. Please take any medicines you need to ensure this blood glucose level. 24 hours before your scan: Don t do any heavy exercise. (No jogging, aerobics or other workouts.) Exercise will make your pictures less accurate. 6 hours before your scan:   Stop all food and liquids (except water).   Do not chew gum or suck on mints.   If you need to take medicine with food, you may take it with a few crackers.  Please call your Imaging Department at your exam site with any questions.            Feb 07, 2017  3:15 PM   [facility] Lab Draw with  [facility] LAB DRAW   [facility] Lab Draw ([facility])    [address] [place] [address]  Floor  [facility] [address] [phone]            Feb 07, 2017  3:45 PM   (Arrive by 3:30 PM)   Return Visit with Nenita Bartholomew MD   [facility] ([facility])    [address]  2nd Floor  [facility] [id number] [phone]              Who to contact     If you have questions or need follow up information about today's clinic visit or your schedule please contact [facility] directly at [phone].  Normal or non-critical lab and imaging results will be communicated to you by [employer], letter or phone within 4 business days after the clinic has received the results. If you do not hear from us within 7 days, please contact the clinic through [email or web address] or phone. If you have a critical or abnormal lab result, we will notify you by phone as soon as possible.  Submit refill requests through [employer]OrPass or call your pharmacy and they will forward the refill request to us. Please allow 3 business days for your refill to be completed.          Additional Information About Your Visit        MyChart Information     [employer]OrPass gives you secure access to your electronic health record. If you see a primary care provider, you can also send messages to your care team and make appointments. If you have questions, please call your primary care clinic.  If you do not have a primary care provider, please call [phone] and they will assist you.        Care EveryWhere ID     This is your Care EveryWhere ID. This could be used by other organizations to access your [place] medical records  [id number]        Your Vitals Were     Pulse Temperature Respirations Pulse Oximetry          90 97.5  F (36.4  C) (Oral) 16 98%         Blood Pressure from Last 3 Encounters:   01/31/17 128/88   01/24/17 130/85   01/17/17 124/76    Weight from Last 3 Encounters:   01/31/17 113.172 kg (249 lb 8 oz)   01/24/17 114.669 kg (252 lb 12.8 oz)   01/18/17 114.306 kg (252 lb)               Today, you had the following     No orders found for display       Primary Care Provider    None Specified       No primary provider on file.        Thank you!     Thank you for choosing [facility]  for your care. Our goal is always to provide you with excellent care. Hearing back from our patients is one way we can continue to improve our services. Please take a few minutes to complete the written survey that you may receive in the mail after your visit with us. Thank you!             Your Updated Medication List - Protect others around you: Learn how to safely use, store and throw away your medicines at www.disposemymeds.org.          This list is accurate as of: 1/31/17 10:54 AM.  Always use your most recent med list.                   Brand Name Dispense Instructions for use    cyclobenzaprine 10 MG tablet    FLEXERIL     Take 10 mg by mouth       diphenhydrAMINE 25 MG tablet    BENADRYL    50 tablet    Take 1-2 tablets (25-50 mg) by mouth every 6 hours as needed for itching or allergies       famotidine 10 MG tablet    PEPCID    20 tablet    Take 2 tablets (20 mg) by mouth 2 times daily       fentaNYL 25 mcg/hr 72 hr patch    DURAGESIC    10 patch    Place 1 patch onto the skin every 72 hours       FOLIC ACID PO      Take 800 mcg by mouth daily       IBUPROFEN PO          levETIRAcetam 100 MG/ML solution    KEPPRA     Take 10 mg/kg by mouth 2 times daily       lidocaine 5 % Patch    LIDODERM    30 patch    Apply up to 3 patches to painful area at once for up to 12 h within a 24 h period.  Remove after 12 hours.       loratadine 10 MG tablet    CLARITIN    30 tablet    Take 1 tablet (10 mg) by mouth daily       magnesium hydroxide 2400 MG/10ML Susp    MOM     Take 5 mLs by mouth daily as needed for constipation       ondansetron 8 MG tablet    ZOFRAN    60 tablet    Take 1 tablet (8 mg) by mouth every 8 hours as needed       OSCAL 500/200 D-3 PO          oxyCODONE 5 MG capsule     OXY-IR    100 capsule    Take 1 capsule (5 mg) by mouth every 4 hours as needed for moderate to severe pain       * PROCHLORPERAZINE MALEATE PO      Take 10 mg by mouth every 6 hours as needed for nausea or vomiting       * prochlorperazine 10 MG tablet    COMPAZINE    60 tablet    Take 1 tablet (10 mg) by mouth every 6 hours as needed for nausea or vomiting       SENNA CO          TYLENOL PO      Take 500 mg by mouth daily as needed       * Notice:  This list has 2 medication(s) that are the same as other medications prescribed for you. Read the directions carefully, and ask your doctor or other care provider to review them with you.

## 2017-01-31 NOTE — MR AVS SNAPSHOT
After Visit Summary   1/31/2017    Naren Kimble    MRN: [id number]           Patient Information     Date Of Birth          1975        Visit Information        Provider Department      1/31/2017 12:00 PM  15 ATC;  ONCOLOGY INFUSION [facility]        Today's Diagnoses     Non-small cell lung cancer (NSCLC) (H)    -  1       Care Instructions      Contact Numbers    [facility] [place] [phone]  [facility] [place]:  [phone]    Call triage with chills and/or temperature greater than or equal to 100.5, uncontrolled nausea/vomiting, diarrhea, constipation, dizziness, shortness of breath, chest pain, bleeding, unexplained bruising, or any new/concerning symptoms, questions/concerns.     If you are having any concerning symptoms or wish to speak to a provider before your next infusion visit, please call your care coordinator or triage to notify them so we can adequately serve you.       After Hours: [phone]    If after hours, weekends, or holidays, call main hospital  and ask for Oncology doctor on call.         January 2017 Sunday Monday Tuesday Wednesday Thursday Friday Saturday   1     2     3     P [facility] LAB DRAW    1:30 PM   (15 min.)    MASONIC LAB DRAW   [facility] Lab Draw     [facility] RETURN    2:00 PM   (50 min.)   Yulia Lepe, DANIA CNP   [facility] ONC INFUSION 120    3:00 PM   (120 min.)    ONCOLOGY INFUSION   [facility]     MR BRAIN W    6:00 PM   (60 min.)   UR2   [facility]     MR THORACIC SPINE WWO    7:00 PM   (60 min.)   UR2   [facility], [facility]     MR LUMBAR SPINE WWO    8:00 PM   (60 min.)   UR2   [facility], MRI 4     5     6     [facility] LAB DRAW    9:15 AM   (15 min.)    MASONIC LAB DRAW   [facility] Mason[facility] Lab Draw 7       8     9     10     [facility] LAB DRAW   10:45 AM   (15 min.)    [facility] LAB DRAW   [facility] Mason[facility] Lab Draw     [facility] RETURN   11:20  AM   (50 min.)   Yulia Lepe APRN CNP   [facility]    2:00 PM   (120 min.)   [facility] ONCOLOGY INFUSION   [facility] 11     [facility] CONSULT    2:00 PM   (90 min.)   Eden Kovacs MD   [facility]     [facility] TCT/SIM SUITE    3:00 PM   (60 min.)   Eden Kovacs MD   [facility] 12     13     P TREATMENT PLAN VISIT    7:00 AM   (15 min.)   Eden Kovacs MD   [facility] 14       15     16     [facility] EXTERNAL RADIATION TREATMT    7:30 AM   (15 min.)   [facility] RAD ONC ELEKTA   [facility] 17     [facility] EXTERNAL RADIATION TREATMT    7:30 AM   (15 min.)   [facility] RAD ONC ELEKTA   [facility] Oncology Clinic     [facility] MASONIC LAB DRAW    9:45 AM   (15 min.)    MASONIC LAB DRAW   [facility] Lab Draw     [facility] RETURN   10:00 AM   (30 min.)   Nenita Bartholomew MD   [facility] ONC INFUSION 120    1:30 PM   (120 min.)    ONCOLOGY INFUSION   [facility] 18     [facility] EXTERNAL RADIATION TREATMT    7:30 AM   (15 min.)   [facility] RAD ONC ELEKTA   Radiation Oncology Clinic     [facility] ON TREATMENT VISIT    7:45 AM   (15 min.)   Eden Kovacs MD   [facility] 19     [facility] EXTERNAL RADIATION TREATMT    7:30 AM   (15 min.)   [facility] RAD ONC ELEKTA   [facility] 20     [facility] EXTERNAL RADIATION TREATMT    7:30 AM   (15 min.)   [facility] RAD ONC ELEKTA   Rad[facility] Oncology Clinic     [facility] NURSE VISIT    8:20 AM   (10 min.)   Nurse,  Oncology   [facility] 21       22     23     24     P [facility] LAB DRAW    1:30 PM   (15 min.)    [facility]   [facility]     [facility] RETURN    2:00 PM   (50 min.)   Yulia Lepe APRN CNP   [facility]    3:00 PM   (120 min.)    ONCOLOGY INFUSION   [facility] 25     26     27     28       29     30     31     [facility] MASONIC  LAB DRAW    9:45 AM   (15 min.)    MASONIC LAB DRAW   [facility] Lab Draw     [facility] RETURN   10:30 AM   (50 min.)   Yulia Lepe APRN CNP   [facility] ONC INFUSION 120   12:00 PM   (120 min.)    ONCOLOGY INFUSION   [facility]                                February 2017 Sunday Monday Tuesday Wednesday Thursday Friday Saturday                  1     2     3     4       5     6     PET ONCOLOGY WHOLE BODY    7:30 AM   (45 min.)   UUPET1   [facility] [place] PET CT 7     [facility]    3:15 PM   (15 min.)   [facility] LAB DRAW   [facility] Lab Draw     [facility] RETURN    3:45 PM   (30 min.)   Nenita Bartholomew MD   [facility] 8     9     10     11       12     13     14     15     16     17     18       19     20     21     22     23  Happy Birthday!     24     25       26     27     28                                     Recent Results (from the past 24 hour(s))   CBC with platelets differential    Collection Time: 01/31/17  9:57 AM   Result Value Ref Range    WBC 4.4 4.0 - 11.0 10e9/L    RBC Count 3.39 (L) 4.4 - 5.9 10e12/L    Hemoglobin 9.0 (L) 13.3 - 17.7 g/dL    Hematocrit 28.6 (L) 40.0 - 53.0 %    MCV 84 78 - 100 fl    MCH 26.5 26.5 - 33.0 pg    MCHC 31.5 31.5 - 36.5 g/dL    RDW 14.5 10.0 - 15.0 %    Platelet Count 201 150 - 450 10e9/L    Diff Method Automated Method     % Neutrophils 73.0 %    % Lymphocytes 11.3 %    % Monocytes 10.2 %    % Eosinophils 4.5 %    % Basophils 0.5 %    % Immature Granulocytes 0.5 %    Nucleated RBCs 0 0 /100    Absolute Neutrophil 3.2 1.6 - 8.3 10e9/L    Absolute Lymphocytes 0.5 (L) 0.8 - 5.3 10e9/L    Absolute Monocytes 0.5 0.0 - 1.3 10e9/L    Absolute Eosinophils 0.2 0.0 - 0.7 10e9/L    Absolute Basophils 0.0 0.0 - 0.2 10e9/L    Abs Immature Granulocytes 0.0 0 - 0.4 10e9/L    Absolute Nucleated RBC 0.0    Comprehensive metabolic panel    Collection Time: 01/31/17  9:57 AM   Result Value Ref Range     Sodium 134 133 - 144 mmol/L    Potassium 4.2 3.4 - 5.3 mmol/L    Chloride 100 94 - 109 mmol/L    Carbon Dioxide 26 20 - 32 mmol/L    Anion Gap 8 3 - 14 mmol/L    Glucose 95 70 - 99 mg/dL    Urea Nitrogen 13 7 - 30 mg/dL    Creatinine 0.66 0.66 - 1.25 mg/dL    GFR Estimate >90  Non  GFR Calc   >60 mL/min/1.7m2    GFR Estimate If Black >90   GFR Calc   >60 mL/min/1.7m2    Calcium 9.1 8.5 - 10.1 mg/dL    Bilirubin Total 0.3 0.2 - 1.3 mg/dL    Albumin 3.0 (L) 3.4 - 5.0 g/dL    Protein Total 8.4 6.8 - 8.8 g/dL    Alkaline Phosphatase 58 40 - 150 U/L    ALT 17 0 - 70 U/L    AST 20 0 - 45 U/L   Magnesium    Collection Time: 01/31/17  9:57 AM   Result Value Ref Range    Magnesium 1.9 1.6 - 2.3 mg/dL   Phosphorus    Collection Time: 01/31/17  9:57 AM   Result Value Ref Range    Phosphorus 3.9 2.5 - 4.5 mg/dL   Lactate Dehydrogenase    Collection Time: 01/31/17  9:57 AM   Result Value Ref Range    Lactate Dehydrogenase 402 (H) 85 - 227 U/L                 Follow-ups after your visit        Your next 10 appointments already scheduled     Feb 06, 2017  7:30 AM   PET ONCOLOGY WHOLE BODY with UUPET1   [facility] [place] PET CT ([facility] [place] [facility])    [address] [address] [phone]           Tell your doctor:   If there is any chance you may be pregnant or if you are breastfeeding.   If you have problems lying in small spaces (claustrophobia). If you do, your doctor may give you medicine to help you relax. If you have diabetes:   Have your exam early in the morning. Your blood glucose will go up as the day goes by.   Your glucose level must be 180 or less at the start of the exam. Please take any medicines you need to ensure this blood glucose level. 24 hours before your scan: Don t do any heavy exercise. (No jogging, aerobics or other workouts.) Exercise will make your pictures less accurate. 6 hours before your scan:   Stop all food  and liquids (except water).   Do not chew gum or suck on mints.   If you need to take medicine with food, you may take it with a few crackers.  Please call your Imaging Department at your exam site with any questions.            Feb 07, 2017  3:15 PM   [facility] Lab Draw with  MASONIC LAB DRAW   [facility] Lab Draw ([facility])    [address] [place] [address] [phone]            Feb 07, 2017  3:45 PM   (Arrive by 3:30 PM)   Return Visit with Nenita Bartholomew MD   [facility] ([facility])    [address] [place] [address] [phone]              Who to contact     If you have questions or need follow up information about today's clinic visit or your schedule please contact [facility] directly at [phone].  Normal or non-critical lab and imaging results will be communicated to you by GetMeMediahart, letter or phone within 4 business days after the clinic has received the results. If you do not hear from us within 7 days, please contact the clinic through [employer] or phone. If you have a critical or abnormal lab result, we will notify you by phone as soon as possible.  Submit refill requests through [employer] or call your pharmacy and they will forward the refill request to us. Please allow 3 business days for your refill to be completed.          Additional Information About Your Visit        GetMeMediahart Information     [employer] gives you secure access to your electronic health record. If you see a primary care provider, you can also send messages to your care team and make appointments. If you have questions, please call your primary care clinic.  If you do not have a primary care provider, please call [phone] and they will assist you.        Care EveryWhere ID     This is your Care EveryWhere ID. This could be used by other organizations to access your [place]  medical records  [id number]        Your Vitals Were     Pulse Temperature Respirations Pulse Oximetry          90 97.5  F (36.4  C) (Oral) 16 98%         Blood Pressure from Last 3 Encounters:   01/31/17 128/88   01/31/17 128/88   01/24/17 130/85    Weight from Last 3 Encounters:   01/31/17 113.172 kg (249 lb 8 oz)   01/31/17 113.172 kg (249 lb 8 oz)   01/24/17 114.669 kg (252 lb 12.8 oz)              We Performed the Following     CBC with platelets differential     Comprehensive metabolic panel     Lactate Dehydrogenase     Magnesium     No corticosteriods     Nursing Communication 1     Nursing Communication 1     Nursing Communication 2     Phosphorus     Treatment Conditions     Treatment Conditions        Primary Care Provider    None Specified       No primary provider on file.        Thank you!     Thank you for choosing [facility] CANCER [facility]  for your care. Our goal is always to provide you with excellent care. Hearing back from our patients is one way we can continue to improve our services. Please take a few minutes to complete the written survey that you may receive in the mail after your visit with us. Thank you!             Your Updated Medication List - Protect others around you: Learn how to safely use, store and throw away your medicines at www.disposemymeds.org.          This list is accurate as of: 1/31/17  1:48 PM.  Always use your most recent med list.                   Brand Name Dispense Instructions for use    cyclobenzaprine 10 MG tablet    FLEXERIL     Take 10 mg by mouth       diphenhydrAMINE 25 MG tablet    BENADRYL    50 tablet    Take 1-2 tablets (25-50 mg) by mouth every 6 hours as needed for itching or allergies       famotidine 10 MG tablet    PEPCID    20 tablet    Take 2 tablets (20 mg) by mouth 2 times daily       fentaNYL 25 mcg/hr 72 hr patch    DURAGESIC    10 patch    Place 1 patch onto the skin every 72 hours       FOLIC ACID PO      Take 800 mcg by mouth daily        IBUPROFEN PO          levETIRAcetam 100 MG/ML solution    KEPPRA     Take 10 mg/kg by mouth 2 times daily       lidocaine 5 % Patch    LIDODERM    30 patch    Apply up to 3 patches to painful area at once for up to 12 h within a 24 h period.  Remove after 12 hours.       loratadine 10 MG tablet    CLARITIN    30 tablet    Take 1 tablet (10 mg) by mouth daily       magnesium hydroxide 2400 MG/10ML Susp    MOM     Take 5 mLs by mouth daily as needed for constipation       ondansetron 8 MG tablet    ZOFRAN    60 tablet    Take 1 tablet (8 mg) by mouth every 8 hours as needed       OSCAL 500/200 D-3 PO          oxyCODONE 5 MG capsule    OXY-IR    100 capsule    Take 1 capsule (5 mg) by mouth every 4 hours as needed for moderate to severe pain       * PROCHLORPERAZINE MALEATE PO      Take 10 mg by mouth every 6 hours as needed for nausea or vomiting       * prochlorperazine 10 MG tablet    COMPAZINE    60 tablet    Take 1 tablet (10 mg) by mouth every 6 hours as needed for nausea or vomiting       SENNA CO          TYLENOL PO      Take 500 mg by mouth daily as needed       * Notice:  This list has 2 medication(s) that are the same as other medications prescribed for you. Read the directions carefully, and ask your doctor or other care provider to review them with you.

## 2017-01-31 NOTE — Clinical Note
1/31/2017       RE: Naren Kimble  [address]  [facility] [address]     Dear Colleague,    Thank you for referring your patient, Naren Kimble, to the [facility]. Please see a copy of my visit note below.    Naren Kimble is a 41 year old man with metastatic NSCLC, seen for evaluation prior to c2, d29 ALT-803  Nivolumab.    Oncology HPI:    Stage IV non-small cell lung cancer.  Initially presented in 07/2016 with a brain mass, which was resected and was consistent with metastatic adenocarcinoma of the lung. He was treated with stereotactic brain radiation to the tumor bed.  Negative EGFR and ALK rearrangement studies.   ROS1 studies are not known.  CT scan in 07/2016 did not have any definitive mass in the lung.  PET/CT 08/2015 showed 1 cm mass in the left lower lobe.  Bronchoscopy was negative with the [facility] system.  He proceeded with cisplatin and Alimta after stereotactic radiation to resection bed and started chemotherapy 09/19/2016.  Imaging after 2 cycles of chemotherapy were consistent with metastatic disease in the bone.  He was started on Zometa. He progressed on cisplatin/alimta and was started on a clinical trial with ALT-803 plus nivolumab. He had increased edema, fevers and skin rash with the first 2 treatments. Because of this he only received Nivolumab on day 15. He proceeded with subsequent treatment without further difficulty. 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 He was initiated on palliative radiation to the T spine, sacrum and left hip 1/16/17- 1/20/17.    Interval history: Valerio is feeling pretty well. Minimal side effects from the treatment. Energy is fairly good. Is keeping up with household activities. Was able to  basketball this weekend. Appetite is good. No nausea/vomiting/reflux. Bowel/bladder function stable. Has a little more hesitancy while on oxycodone. Pain in the groin  is better. Resumed fentanyl after his last visit. Has some low back pain. Using oxycodone with alternating doses of tylenol/ibuprofen. Still has some light numbness on the anterior thighs and right thumb. Unchanged for the past few weeks. No change to leg strength. Left leg lifting is limited by pain.    Exam: alert, appears well. Blood pressure 128/88, pulse 90, temperature 97.5  F (36.4  C), temperature source Oral, resp. rate 16, weight 113.172 kg (249 lb 8 oz), SpO2 98 %.  Oropharynx is moist, no focal lesion. No icterus. Neck supple and without adenopathy. Lungs:CTA. Heart:RRR, no murmur or rub. Abdomen: soft, nontender, BS active. No masses or organomegaly. Extremities: warm, no edema. Speech is clear. CN wnl. Gait stable. Strength: BUE 5/5, LLE 4-5/5, RLE 5/5.     Labs:Results for OBDULIA CHIANG (MRN [id number]) as of 1/31/2017 10:31   Ref. Range 1/31/2017 09:57   Sodium Latest Ref Range: 133-144 mmol/L 134   Potassium Latest Ref Range: 3.4-5.3 mmol/L 4.2   Chloride Latest Ref Range:  mmol/L 100   Carbon Dioxide Latest Ref Range: 20-32 mmol/L 26   Urea Nitrogen Latest Ref Range: 7-30 mg/dL 13   Creatinine Latest Ref Range: 0.66-1.25 mg/dL 0.66   GFR Estimate Latest Ref Range: >60 mL/min/1.7m2 >90...   GFR Estimate If Black Latest Ref Range: >60 mL/min/1.7m2 >90...   Calcium Latest Ref Range: 8.5-10.1 mg/dL 9.1   Anion Gap Latest Ref Range: 3-14 mmol/L 8   Magnesium Latest Ref Range: 1.6-2.3 mg/dL 1.9   Phosphorus Latest Ref Range: 2.5-4.5 mg/dL 3.9   Albumin Latest Ref Range: 3.4-5.0 g/dL 3.0 (L)   Protein Total Latest Ref Range: 6.8-8.8 g/dL 8.4   Bilirubin Total Latest Ref Range: 0.2-1.3 mg/dL 0.3   Alkaline Phosphatase Latest Ref Range:  U/L 58   ALT Latest Ref Range: 0-70 U/L 17   AST Latest Ref Range: 0-45 U/L 20   Lactate Dehydrogenase Latest Ref Range:  U/L 402 (H)   Glucose Latest Ref Range: 70-99 mg/dL 95   WBC Latest Ref Range: 4.0-11.0 10e9/L 4.4   Hemoglobin Latest Ref Range:  13.3-17.7 g/dL 9.0 (L)   Hematocrit Latest Ref Range: 40.0-53.0 % 28.6 (L)   Platelet Count Latest Ref Range: 150-450 10e9/L 201   RBC Count Latest Ref Range: 4.4-5.9 10e12/L 3.39 (L)   MCV Latest Ref Range:  fl 84   MCH Latest Ref Range: 26.5-33.0 pg 26.5   MCHC Latest Ref Range: 31.5-36.5 g/dL 31.5   RDW Latest Ref Range: 10.0-15.0 % 14.5   Diff Method Unknown Automated Method   % Neutrophils Latest Units: % 73.0   % Lymphocytes Latest Units: % 11.3   % Monocytes Latest Units: % 10.2   % Eosinophils Latest Units: % 4.5   % Basophils Latest Units: % 0.5   % Immature Granulocytes Latest Units: % 0.5   Nucleated RBCs Latest Ref Range: 0 /100 0   Absolute Neutrophil Latest Ref Range: 1.6-8.3 10e9/L 3.2   Absolute Lymphocytes Latest Ref Range: 0.8-5.3 10e9/L 0.5 (L)   Absolute Monocytes Latest Ref Range: 0.0-1.3 10e9/L 0.5   Absolute Eosinophils Latest Ref Range: 0.0-0.7 10e9/L 0.2   Absolute Basophils Latest Ref Range: 0.0-0.2 10e9/L 0.0   Abs Immature Granulocytes Latest Ref Range: 0-0.4 10e9/L 0.0   Absolute Nucleated RBC Unknown 0.0       Impression/plan:  1. 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 -Continues to tolerate treatment well.  Bone pain is a little better in some areas, but may be worsening in the sternum/low back. Overall, feels well controlled with oxycodone, tylenol/ibuprofen in alternating doses and Fentanyl patch  Will have restaging after this cycle and will follow with Dr. Bartholomew next week  -Will proceed with cycle 2, D29 today, has weekly f/u while on the study.    2. Normochromic, normocytic anemia:  related to disease +/- radiation effect. Stable this week.      3. Back pain-has diffuse metastatic disease. An area of concern is the T2-3 vertebra with mild spinal canal and moderat right neurla foraminal stenosis. Had abutment of the cord without abnormal cord signal.   - Stable right thumb and  "bilateral thigh tingling.    -Completed palliative radiation to the T spine, sacrum and   left hip 1/16/17- 1/20/17.    -Pain improved/stable today.Continue fentanyl patch 25 mcg.Oxycodone 5 mg every 4 hours as needed.  Alternates tylenol with ibuprofen. Max dose of ibuprofen of 2400 mg/day per study.  -Due for zometa today.    4. Brain metastasis, s/p resection, stereotactic radiation on 9/16/16: stable on MRI from 1/3/17.    5. Constipation: Opioid related. Improved with Senna/ MOM as needed.    If you have questions, please contact:  Cherrie Clements  Coordinator Phone (Mobile):  [phone]  Coordinator Cell/Pager:  [phone]                       Comments:    Research RN met with subject for C2W5 for weekly trial therapy.      Patient continues to have lower back pain and hip pain rating 5/10 today with numbness and \"dull ache\" in bilateral outer thigh region and R-thumb.  Continues to use fentenyl patch, oxycodone, tylenol and ibuprofen regularly.  Finished palliative radiation on 1/20.  Able to  son's basketball team this weekend.    Patient reports a \"hard spot\" from injection area las week lasting \"2 to 3 days\".  No other symptoms reported.    RN reviewed currently medications and updated clinic staff with changes as well.  Labs reviewed and will continue with trial treatment.  Imaging and restaging is scheduled for next week.            Again, thank you for allowing me to participate in the care of your patient.      Sincerely,    DANIA Greenfield CNP      "

## 2017-01-31 NOTE — PROGRESS NOTES
Infusion Nursing Note:  Naren Kimble presents today for Cycle 2 Day 29 Nivolumab, ALT-803 study.    Patient seen by provider today: Yes: Yulia Lepe NP.    present during visit today: Not Applicable.    Note: Research CMA preformed every 15 minutes vitals required for ALT-803 study.    Intravenous Access:  Peripheral IV placed.    Treatment Conditions:  HGB      9.0   1/31/2017  WBC      4.4   1/31/2017   ANEU      3.2   1/31/2017  PLT      201   1/31/2017     NA      134   1/31/2017                POTASSIUM      4.2   1/31/2017        MAG      1.9   1/31/2017         CR     0.66   1/31/2017                ROSENDO      9.1   1/31/2017             BILITOTAL      0.3   1/31/2017        ALBUMIN      3.0   1/31/2017                 ALT       17   1/31/2017        AST       20   1/31/2017  Results reviewed, labs MET treatment parameters, ok to proceed with treatment.      Post Infusion Assessment:  Patient tolerated infusion without incident.  Patient tolerated injection without incident.  Blood return noted pre and post infusion.  Site patent and intact, free from redness, edema or discomfort.  No evidence of extravasations.  Access discontinued per protocol.    Discharge Plan:   Patient declined prescription refills.  Discharge instructions reviewed with: Patient and Family.  Patient and/or family verbalized understanding of discharge instructions and all questions answered.  Copy of AVS reviewed with patient and/or family.  Patient will return 2/7/17 for next appointment.  Patient discharged in stable condition accompanied by: self and wife.  Departure Mode: Ambulatory.  Face to Face time: 0 minutes.  Zometa stop time 1340  Instructed patient to take tylenol and benadryl around 5:30 pm (4 hours after chemotherapy was given)    Polina Rollins RN

## 2017-01-31 NOTE — NURSING NOTE
"Naren Kimble is a 41 year old male who presents for:  Chief Complaint   Patient presents with     Oncology Clinic Visit     Lung Cancer        Initial Vitals:  /88 mmHg  Pulse 90  Temp(Src) 97.5  F (36.4  C) (Oral)  Resp 16  Wt 113.172 kg (249 lb 8 oz)  SpO2 98% Estimated body mass index is 32.02 kg/(m^2) as calculated from the following:    Height as of 1/24/17: 1.88 m (6' 2\").    Weight as of this encounter: 113.172 kg (249 lb 8 oz).. There is no height on file to calculate BSA. BP completed using cuff size: large  Moderate Pain (5) No LMP for male patient. Allergies and medications reviewed.     Medications: Medication refills not needed today.  Pharmacy name entered into EPIC:    [facility] [place], [place] - [address] [place] [address] [id number]  [facility] [address] [place] [address] AT [address]    Comments: Patient is not having any pain today.     6 minutes for nursing intake (face to face time)   Isaura Romero CMA         "

## 2017-02-07 NOTE — NURSING NOTE
"Naren Kimble is a 41 year old male who presents for:  Chief Complaint   Patient presents with     Oncology Clinic Visit     Return patient visit- Lung Ca        Initial Vitals:  /87 mmHg  Pulse 122  Temp(Src) 98.1  F (36.7  C) (Oral)  Ht 1.88 m (6' 2\")  Wt 113.036 kg (249 lb 3.2 oz)  BMI 31.98 kg/m2  SpO2 100% Estimated body mass index is 31.98 kg/(m^2) as calculated from the following:    Height as of this encounter: 1.88 m (6' 2\").    Weight as of this encounter: 113.036 kg (249 lb 3.2 oz).. Body surface area is 2.43 meters squared. BP completed using cuff size: large  Data Unavailable No LMP for male patient. Allergies and medications reviewed.     Medications: Medication refills not needed today.  Pharmacy name entered into EPIC:    [facility] [place], [place] - [address] [place] [address] [address] [facility] [address] [place] [address] AT [facility] [address]    Comments:     7 minutes for nursing intake (face to face time)   Baljeet Torres CMA          "

## 2017-02-07 NOTE — LETTER
2/7/2017       RE: Naren Kimble  [address]  [facility] [address]     Dear Colleague,    Thank you for referring your patient, Naren Kimble, to the [facility]. Please see a copy of my visit note below.    [facility]  HEMATOLOGY ONCOLOGY    ONCOLOGY FOLLOWUP NOTE      DIAGNOSIS:  Stage IV non-small cell lung cancer.  Initially presented in 07/2016 with a brain mass, which was resected and was consistent with metastatic adenocarcinoma of the lung.  Negative EGFR and ALK rearrangement studies.  I do not have information about ROS1.  CT scan in 07/2016 did not have any definitive mass in the lung.  PET/CT 08/2015 showed 1 cm mass in the left lower lobe.  Bronchoscopy was negative with the [facility] system.  He proceeded with cisplatin and Alimta for 6 cycles after stereotactic radiation to resection bed and started chemotherapy 09/19/2016.  Imaging after 2 cycles of chemotherapy were consistent with metastatic disease in the bone.  He was started on Zometa and has had 3 cycles of adjuvant cisplatin and Alimta.      TREATMENT:    [id number] with Nivolumab first treatment 11/23/16.   2/7/2017 Txotere     SUBJECTIVE:  The patient was seen as a followup today. His issues with rash have mostly resolved.***    REVIEW OF SYSTEMS:  A complete review of systems was performed and found to be negative other than pertinent positives mentioned in history of present illness.     Past medical, social histories reviewed.    Meds- Reviewed.     PHYSICAL EXAMINATION:   VITAL SIGNS: ***  GENERAL: Sitting comfortably.   HEENT: Pupils are equal. Oropharynx is clear.   NECK: No cervical or supraclavicular lymphadenopathy.   LUNGS: Clear bilaterally.   HEART: S1, S2, regular.   ABDOMEN: Soft, nontender, nondistended, no hepatosplenomegaly.   EXTREMITIES: Warm, well perfused.   NEUROLOGIC: Alert, awake.   SKIN:No rash  LYMPHATICS: No edema.      LABORATORY DATA:    Recent Labs   Lab  Test  01/31/17   0957  01/24/17   1321   NA  134  134   POTASSIUM  4.2  4.4   CHLORIDE  100  100   CO2  26  26   ANIONGAP  8  8   BUN  13  16   CR  0.66  0.66   GLC  95  61*   ROSENDO  9.1  8.9   MAG  1.9  2.3   PHOS  3.9  4.0     Recent Labs   Lab Test  01/31/17   0957  01/24/17   1321  01/17/17   0830   WBC  4.4  4.0  9.7   HGB  9.0*  8.8*  9.2*   PLT  201  278  358   MCV  84  85  84   NEUTROPHIL  73.0  71.3  74.5     Recent Labs   Lab Test  01/31/17   0957  01/24/17   1321  01/17/17   0830   BILITOTAL  0.3  0.3  0.3   ALKPHOS  58  60  61   ALT  17  17  18   AST  20  22  21   ALBUMIN  3.0*  3.1*  3.2*   LDH  402*  489*  407*         Results for orders placed or performed during the hospital encounter of 02/06/17   PET Oncology Whole Body    Narrative    Combined Report of:    PET and CT on  2/6/2017 9:09 AM :    1. PET of the neck, chest, abdomen, and pelvis.  2. PET CT Fusion for Attenuation Correction and Anatomical  Localization:    3. Diagnostic CT scan of the chest, abdomen, and pelvis with  intravenous contrast for interpretation.  4. 3D MIP and PET-CT fused images were processed on an independent  workstation and archived to PACS and reviewed by a radiologist.    Technique:    1. PET: The patient received 15.6 mCi of F-18-FDG; the serum glucose  was 91 prior to administration, body weight was 113 kg. Images were  evaluated in the axial, sagittal, and coronal planes as well as the  rotational whole body MIP. Images were acquired from the Vertex to the  Feet.    UPTAKE WAS MEASURED AT 72 MINUTES.     2. CT: Volumetric acquisition for clinical interpretation of the  chest, abdomen, and pelvis acquired at 3 mm sections . The chest,  abdomen, and pelvis were evaluated at 5 mm sections in bone, soft  tissue, and lung windows.      The patient received 134 cc. Of Isovue 370 intravenously for the  examination.    --    3. 3D MIP and PET-CT fused images were processed on an independent  workstation and archived to PACS  and reviewed by a radiologist.    INDICATION: NSCLC, on ALT-803 (super IL-2 complex) + nivolumab,  restaging, Malignant neoplasm of unspecified part of unspecified  bronchus or lung    ADDITIONAL INFORMATION OBTAINED FROM EMR: none    COMPARISON: 12/23/2016    FINDINGS:     HEAD/NECK:    FDG avid focus involving the right pterygoid plates series 3 image 57  with a max SUV of 13.9. New metabolically active left level 2 lymph  node measuring 9 mm in short axis series 3 image 82. Additional level  2A and level 5 lymph nodes. Otherwise there are no suspicious foci of  FDG within the soft tissues of the neck (please see bone section for  additional findings in the neck)    Postsurgical changes of left frontal mass resection. No evidence for  intracranial hemorrhage, mass effect or midline shift.    The paranasal sinuses are clear. The mastoid air cells are clear.     The mucosal pharyngeal space, the , prevertebral and carotid  spaces are within normal limits.     No masses, mass effect or pathologically enlarged lymph nodes are  evident. The thyroid gland is within normal limits..    CHEST:  1.1 cm left peribronchial hypermetabolic lymph node measures 11 mm a  max SUV of 7 (series 3 image 142), previously 8 mm with a max SUV of  5.4. 1.5 cm right hilar lymph node series 3 image 142 with a max SUV  of 7.6, previously measured 1.1 cm with a max SUV of 3.2. Increased  size of left hilar lymph node series 3 image 13 measuring 1.5 cm with  a max SUV of 11.9, slightly increased compared with a max SUV of 11.3  on the prior study. Left basilar lung mass. Has enlarged now measures  4.0 x 2.2 cm, previously 1.9 x 1.7 cm. It is a max SUV of 15.5.  Increased right subpleural soft tissue mass with FDG avidity.    Heart is not enlarged. No pericardial effusion. No focal  consolidation, pneumothorax or pleural effusion.    ABDOMEN AND PELVIS:  Increased size of bilateral hypermetabolic adrenal masses, the right  measures  4.6 x 4.1 cm with a max SUV of 15.8, the left measures 3.4 x  2.4 cm with a max SUV of 11.9. These measured 3.5 and 1.5 cm  respectively with a max SUVs of 13.8 and 3.8. There are several new  hypoattenuating lesions within the liver with suspicious FDG uptake  for example in the right lobe series 3 image 162 with a max SUV of  7.2. New retrocaval hypermetabolic lymph node series 3 image 211.    The gallbladder, spleen, pancreas are within normal limits. Simple  appearing cysts in the bilateral kidneys are technically  indeterminate. No abnormally dilated small or large bowel. No free air  or free fluid in the abdomen or pelvis. No pelvic masses.    LOWER EXTREMITIES:   No abnormal soft tissue masses or hypermetabolic lesions in the lower  extremities (please see bones section for osseous lesions in the lower  extremities).    BONES and SOFT TISSUE:  There are multiple hypermetabolic osseous lesions throughout the spine  as well as within the pelvis sternum ribs femurs and humeri. For  example lytic lesion within the vertebral body of T5 with a max SUV of  15 point to previously max SUV of 15.5. Notable hypermetabolic lesions  within the left femoral neck with small lytic lesion seen on CT series  3 image 313.. Additional concerning lesions include FDG avid lesion on  the left at C1 with soft tissue involvement of the spinal canal as  well as soft tissue lesion at C7 on the right with involvement of the  neural foramen. There are multiple soft tissue lesions, the most  notable is anterior to the sternum series 3 image 161.      Impression    IMPRESSION: In this patient with a history of non-small cell lung  carcinoma;  1. There is been progression of disease as evidenced by increased size  of presumed primary mass in the left lung base as well as increased  size and number of metastatic lesions in the mediastinum/rosalee as well  as adrenal glands. There are innumerable bony metastases.  2. Osseous and soft tissue  lesions, for example at C1 on the left and  C7 on the right have probable involvement of the spinal canal and  neural foramen respectively. MRI of the cervical spine is recommended  for further evaluation.  3. There are hypermetabolic bony lesions within the femoral necks as  well as the surgical necks of the humeri bilaterally. These presumably  places the patient at risk for pathologic fractures.  4. FDG avid lesion in the region of the right pterygoid plates  5. Post surgical changes of left frontal lobe mass resection without  evidence for intracranial mass, hemorrhage or midline shift.       ECOG performance status 1.      ASSESSMENT:   A 41-year-old gentleman with stage IV adenocarcinoma of the lung, initially presented with a left frontal brain mass which was resected and had radiation, eventually was diagnosed with bone metastases after 2 cycles of adjuvant cisplatin and pemetrexed and has had 3 cycles of cisplatin and pemetrexed and was started on ometa as well.   He is on [id number] trial which is testing nivolumab in combination with an IL-15 super agonist.    PET scan was performed 12/23/16 showed progression at multiple sites. He had palliative radiation to painful bone metastasis. We decided to continue the treatment under trial and repeat a scan in few weeks given his good performance status and potential element of pseudoprogression.  He was screened for MIRATI and STARTRK study as well. He was not a candidate for Mirati and very less likely to be candidate for STARTRK.  - We reviewed PET scan and images from 2/6/17- progression at multiple sites.  - I discussed about option of switching to chemotherapy. Discussed about Taxotere with palliative intent, potential side effects and risks and benefits. He is willing to proceed.  - He will start treatment***     PLAN:  Chemo education- Taxotere every three weeks  Start chemotherapy this week or early next week  RTC MD a week after starting chemotherapy  EZEQUIEL  MD EDDIE    2/7/2017

## 2017-02-07 NOTE — NURSING NOTE
Met with Valerio and his wife Jhonatan, to discuss chemotherapy change to Taxotere.  Provided patient [employer] printouts.  Reviewed administration, side effects and ongoing symptom support management by APPs in clinic. Provided phone numbers for triage and after hours care. Patient and wife stated understanding.

## 2017-02-07 NOTE — LETTER
"2/7/2017      RE: Naren Kimble  [address]  [place] [address]       [facility] PHYSICIANS  HEMATOLOGY ONCOLOGY    ONCOLOGY FOLLOWUP NOTE      DIAGNOSIS:  Stage IV non-small cell lung cancer.  Initially presented in 07/2016 with a brain mass, which was resected and was consistent with metastatic adenocarcinoma of the lung.  Negative EGFR and ALK rearrangement studies.  I do not have information about ROS1.  CT scan in 07/2016 did not have any definitive mass in the lung.  PET/CT 08/2015 showed 1 cm mass in the left lower lobe.  Bronchoscopy was negative with the [facility].  He proceeded with cisplatin and Alimta for 6 cycles after stereotactic radiation to resection bed and started chemotherapy 09/19/2016.  Imaging after 2 cycles of chemotherapy were consistent with metastatic disease in the bone.  He was started on Zometa and has had 3 cycles of adjuvant cisplatin and Alimta.      TREATMENT:    CBK004 with Nivolumab first treatment 11/23/16. Progression on scan 2/6/17.  2/7/2017 Txotere     SUBJECTIVE:  The patient was seen as a followup today. His issues with rash have mostly resolved. His pain is well controlled. We had a detailed discussion about further plan of care.     REVIEW OF SYSTEMS:  A complete review of systems was performed and found to be negative other than pertinent positives mentioned in history of present illness.     Past medical, social histories reviewed.    Meds- Reviewed.     PHYSICAL EXAMINATION:   VITAL SIGNS: /87 mmHg  Pulse 122  Temp(Src) 98.1  F (36.7  C) (Oral)  Ht 1.88 m (6' 2\")  Wt 113.036 kg (249 lb 3.2 oz)  BMI 31.98 kg/m2  SpO2 100%  GENERAL: Sitting comfortably.   HEENT: Pupils are equal. Oropharynx is clear.   NECK: No cervical or supraclavicular lymphadenopathy.   LUNGS: Clear bilaterally.   HEART: S1, S2, regular.   ABDOMEN: Soft, nontender, nondistended, no hepatosplenomegaly.   EXTREMITIES: Warm, well perfused.   NEUROLOGIC: " Alert, awake.   SKIN:No rash  LYMPHATICS: No edema.      LABORATORY DATA:    Recent Labs   Lab Test  01/31/17   0957  01/24/17   1321   NA  134  134   POTASSIUM  4.2  4.4   CHLORIDE  100  100   CO2  26  26   ANIONGAP  8  8   BUN  13  16   CR  0.66  0.66   GLC  95  61*   ROSENDO  9.1  8.9   MAG  1.9  2.3   PHOS  3.9  4.0     Recent Labs   Lab Test  01/31/17   0957  01/24/17   1321  01/17/17   0830   WBC  4.4  4.0  9.7   HGB  9.0*  8.8*  9.2*   PLT  201  278  358   MCV  84  85  84   NEUTROPHIL  73.0  71.3  74.5     Recent Labs   Lab Test  01/31/17   0957  01/24/17   1321  01/17/17   0830   BILITOTAL  0.3  0.3  0.3   ALKPHOS  58  60  61   ALT  17  17  18   AST  20  22  21   ALBUMIN  3.0*  3.1*  3.2*   LDH  402*  489*  407*         Results for orders placed or performed during the hospital encounter of 02/06/17   PET Oncology Whole Body    Narrative    Combined Report of:    PET and CT on  2/6/2017 9:09 AM :    1. PET of the neck, chest, abdomen, and pelvis.  2. PET CT Fusion for Attenuation Correction and Anatomical  Localization:    3. Diagnostic CT scan of the chest, abdomen, and pelvis with  intravenous contrast for interpretation.  4. 3D MIP and PET-CT fused images were processed on an independent  workstation and archived to PACS and reviewed by a radiologist.    Technique:    1. PET: The patient received 15.6 mCi of F-18-FDG; the serum glucose  was 91 prior to administration, body weight was 113 kg. Images were  evaluated in the axial, sagittal, and coronal planes as well as the  rotational whole body MIP. Images were acquired from the Vertex to the  Feet.    UPTAKE WAS MEASURED AT 72 MINUTES.     2. CT: Volumetric acquisition for clinical interpretation of the  chest, abdomen, and pelvis acquired at 3 mm sections . The chest,  abdomen, and pelvis were evaluated at 5 mm sections in bone, soft  tissue, and lung windows.      The patient received 134 cc. Of Isovue 370 intravenously for the  examination.    --    3. 3D  MIP and PET-CT fused images were processed on an independent  workstation and archived to PACS and reviewed by a radiologist.    INDICATION: NSCLC, on ALT-803 (super IL-2 complex) + nivolumab,  restaging, Malignant neoplasm of unspecified part of unspecified  bronchus or lung    ADDITIONAL INFORMATION OBTAINED FROM EMR: none    COMPARISON: 12/23/2016    FINDINGS:     HEAD/NECK:    FDG avid focus involving the right pterygoid plates series 3 image 57  with a max SUV of 13.9. New metabolically active left level 2 lymph  node measuring 9 mm in short axis series 3 image 82. Additional level  2A and level 5 lymph nodes. Otherwise there are no suspicious foci of  FDG within the soft tissues of the neck (please see bone section for  additional findings in the neck)    Postsurgical changes of left frontal mass resection. No evidence for  intracranial hemorrhage, mass effect or midline shift.    The paranasal sinuses are clear. The mastoid air cells are clear.     The mucosal pharyngeal space, the , prevertebral and carotid  spaces are within normal limits.     No masses, mass effect or pathologically enlarged lymph nodes are  evident. The thyroid gland is within normal limits..    CHEST:  1.1 cm left peribronchial hypermetabolic lymph node measures 11 mm a  max SUV of 7 (series 3 image 142), previously 8 mm with a max SUV of  5.4. 1.5 cm right hilar lymph node series 3 image 142 with a max SUV  of 7.6, previously measured 1.1 cm with a max SUV of 3.2. Increased  size of left hilar lymph node series 3 image 13 measuring 1.5 cm with  a max SUV of 11.9, slightly increased compared with a max SUV of 11.3  on the prior study. Left basilar lung mass. Has enlarged now measures  4.0 x 2.2 cm, previously 1.9 x 1.7 cm. It is a max SUV of 15.5.  Increased right subpleural soft tissue mass with FDG avidity.    Heart is not enlarged. No pericardial effusion. No focal  consolidation, pneumothorax or pleural effusion.    ABDOMEN  AND PELVIS:  Increased size of bilateral hypermetabolic adrenal masses, the right  measures 4.6 x 4.1 cm with a max SUV of 15.8, the left measures 3.4 x  2.4 cm with a max SUV of 11.9. These measured 3.5 and 1.5 cm  respectively with a max SUVs of 13.8 and 3.8. There are several new  hypoattenuating lesions within the liver with suspicious FDG uptake  for example in the right lobe series 3 image 162 with a max SUV of  7.2. New retrocaval hypermetabolic lymph node series 3 image 211.    The gallbladder, spleen, pancreas are within normal limits. Simple  appearing cysts in the bilateral kidneys are technically  indeterminate. No abnormally dilated small or large bowel. No free air  or free fluid in the abdomen or pelvis. No pelvic masses.    LOWER EXTREMITIES:   No abnormal soft tissue masses or hypermetabolic lesions in the lower  extremities (please see bones section for osseous lesions in the lower  extremities).    BONES and SOFT TISSUE:  There are multiple hypermetabolic osseous lesions throughout the spine  as well as within the pelvis sternum ribs femurs and humeri. For  example lytic lesion within the vertebral body of T5 with a max SUV of  15 point to previously max SUV of 15.5. Notable hypermetabolic lesions  within the left femoral neck with small lytic lesion seen on CT series  3 image 313.. Additional concerning lesions include FDG avid lesion on  the left at C1 with soft tissue involvement of the spinal canal as  well as soft tissue lesion at C7 on the right with involvement of the  neural foramen. There are multiple soft tissue lesions, the most  notable is anterior to the sternum series 3 image 161.      Impression    IMPRESSION: In this patient with a history of non-small cell lung  carcinoma;  1. There is been progression of disease as evidenced by increased size  of presumed primary mass in the left lung base as well as increased  size and number of metastatic lesions in the mediastinum/rosalee as  well  as adrenal glands. There are innumerable bony metastases.  2. Osseous and soft tissue lesions, for example at C1 on the left and  C7 on the right have probable involvement of the spinal canal and  neural foramen respectively. MRI of the cervical spine is recommended  for further evaluation.  3. There are hypermetabolic bony lesions within the femoral necks as  well as the surgical necks of the humeri bilaterally. These presumably  places the patient at risk for pathologic fractures.  4. FDG avid lesion in the region of the right pterygoid plates  5. Post surgical changes of left frontal lobe mass resection without  evidence for intracranial mass, hemorrhage or midline shift.       ECOG performance status 1.      ASSESSMENT:   A 41-year-old gentleman with stage IV adenocarcinoma of the lung, initially presented with a left frontal brain mass which was resected and had radiation, eventually was diagnosed with bone metastases after 2 cycles of adjuvant cisplatin and pemetrexed and has had 3 cycles of cisplatin and pemetrexed and was started on ometa as well.   He is on GUJ113 trial which is testing nivolumab in combination with an IL-15 super agonist.    PET scan was performed 12/23/16 showed progression at multiple sites. He had palliative radiation to painful bone metastasis. We decided to continue the treatment under trial and repeat a scan in few weeks given his good performance status and potential element of pseudoprogression.  He was screened for MIRATI and STARTRK study as well. He was not a candidate for Mirati and very less likely to be candidate for STARTRK.  - We reviewed PET scan and images from 2/6/17- progression at multiple sites.  - I discussed about option of switching to chemotherapy. Discussed about Taxotere with palliative intent, potential side effects and risks and benefits. He is willing to proceed.  - He will start treatment soon. I will see him after his first cycle to assess his  tolerance.      PLAN:  Chemo education- Taxotere every three weeks  Start chemotherapy this week or early next week  RTC MD a week after starting chemotherapy  NENITA BARTHOLOMEW MD    2/7/2017        Nenita Bartholomew MD

## 2017-02-07 NOTE — MR AVS SNAPSHOT
After Visit Summary   2/7/2017    Naren Kimble    MRN: [id number]           Patient Information     Date Of Birth          1975        Visit Information        Provider Department      2/7/2017 3:45 PM Nenita Bartholomew MD [facility]        Today's Diagnoses     Non-small cell lung cancer (NSCLC) (H)    -  1        Follow-ups after your visit        Your next 10 appointments already scheduled     Feb 07, 2017  3:45 PM   (Arrive by 3:30 PM)   Return Visit with Nenita Bartholomew MD   [facility] ([facility])    [address] [place] [id number]   [phone]            Feb 09, 2017 10:15 AM   [facility] Lab Draw with [facility] LAB DRAW   [facility]    [address] [place] [id number]   [phone]            Feb 09, 2017 11:00 AM   Infusion 120 with UC ONCOLOGY INFUSION, UC 25 ATC   [facility] ([facility])    [address] [place] [id number]   [phone]            Feb 14, 2017 11:00 AM   (Arrive by 10:45 AM)   Return Visit with Nenita Bartholomew MD   [facility] ([facility])    [address] [place] [id number]   [phone]            Mar 02, 2017 10:30 AM   [facility] Lab Draw with [facility] LAB DRAW   [facility]    [address] [place] [id number]   [phone]            Mar 02, 2017 11:00 AM   Infusion 120 with UC ONCOLOGY INFUSION, UC 18 ATC   [facility] ([facility])    [address] [place] [id number]   [phone]            Mar 23, 2017  8:30 AM   [facility] Lab Draw with [facility] LAB DRAW   [facility])    [address]  [address]  [facility] [address] [phone]            Mar 23, 2017  9:00 AM   Infusion 120 with UC ONCOLOGY INFUSION   [facility] ([facility])    [address]  [address]nd Floor  [facility] [id number] [phone]              Who to contact     If you have questions or need follow up information about today's clinic visit or your schedule please contact [facility] directly at [phone].  Normal or non-critical lab and imaging results will be communicated to you by [email or web address], letter or phone within 4 business days after the clinic has received the results. If you do not hear from us within 7 days, please contact the clinic through [email or web address] or phone. If you have a critical or abnormal lab result, we will notify you by phone as soon as possible.  Submit refill requests through [email or web address] or call your pharmacy and they will forward the refill request to us. Please allow 3 business days for your refill to be completed.          Additional Information About Your Visit        [email or web address] Information     [email or web address] gives you secure access to your electronic health record. If you see a primary care provider, you can also send messages to your care team and make appointments. If you have questions, please call your primary care clinic.  If you do not have a primary care provider, please call [phone] and they will assist you.        Care EveryWhere ID     This is your Care EveryWhere ID. This could be used by other organizations to access your [place] medical records  [id number]        Your Vitals Were     Pulse Temperature Height BMI (Body Mass Index) Pulse Oximetry       122 98.1  F (36.7  C) (Oral) 1.88 m (6' 2\") 31.98 kg/m2 100%        Blood Pressure from Last 3 Encounters:   02/07/17 124/87   01/31/17 133/80   01/31/17 128/88    Weight from Last 3 Encounters:   02/07/17 113.036 kg (249 lb 3.2 oz)   01/31/17 " 113.172 kg (249 lb 8 oz)   01/31/17 113.172 kg (249 lb 8 oz)              Today, you had the following     No orders found for display         Today's Medication Changes          These changes are accurate as of: 2/7/17  3:44 PM.  If you have any questions, ask your nurse or doctor.               Start taking these medicines.        Dose/Directions    dexamethasone 4 MG tablet   Commonly known as:  DECADRON   Used for:  Non-small cell lung cancer (NSCLC) (H)   Started by:  Nenita Bartholomew MD        Dose:  8 mg   Take 2 tablets (8 mg) by mouth 2 times daily (with meals) for 6 doses Begin the evening prior to Docetaxel, and on the morning of Docetaxel, then continue for 4 additional doses.   Quantity:  12 tablet   Refills:  7            Where to get your medicines      Some of these will need a paper prescription and others can be bought over the counter.  Ask your nurse if you have questions.     Bring a paper prescription for each of these medications    - dexamethasone 4 MG tablet             Primary Care Provider    None Specified       No primary provider on file.        Thank you!     Thank you for choosing [facility]  for your care. Our goal is always to provide you with excellent care. Hearing back from our patients is one way we can continue to improve our services. Please take a few minutes to complete the written survey that you may receive in the mail after your visit with us. Thank you!             Your Updated Medication List - Protect others around you: Learn how to safely use, store and throw away your medicines at www.disposemymeds.org.          This list is accurate as of: 2/7/17  3:44 PM.  Always use your most recent med list.                   Brand Name Dispense Instructions for use    cyclobenzaprine 10 MG tablet    FLEXERIL     Take 10 mg by mouth       dexamethasone 4 MG tablet    DECADRON    12 tablet    Take 2 tablets (8 mg) by mouth 2 times daily (with meals) for 6 doses  Begin the evening prior to Docetaxel, and on the morning of Docetaxel, then continue for 4 additional doses.       diphenhydrAMINE 25 MG tablet    BENADRYL    50 tablet    Take 1-2 tablets (25-50 mg) by mouth every 6 hours as needed for itching or allergies       famotidine 10 MG tablet    PEPCID    20 tablet    Take 2 tablets (20 mg) by mouth 2 times daily       fentaNYL 25 mcg/hr 72 hr patch    DURAGESIC    10 patch    Place 1 patch onto the skin every 72 hours       FOLIC ACID PO      Take 800 mcg by mouth daily       IBUPROFEN PO          levETIRAcetam 100 MG/ML solution    KEPPRA     Take 10 mg/kg by mouth 2 times daily       lidocaine 5 % Patch    LIDODERM    30 patch    Apply up to 3 patches to painful area at once for up to 12 h within a 24 h period.  Remove after 12 hours.       loratadine 10 MG tablet    CLARITIN    30 tablet    Take 1 tablet (10 mg) by mouth daily       magnesium hydroxide 2400 MG/10ML Susp    MOM     Take 5 mLs by mouth daily as needed for constipation       ondansetron 8 MG tablet    ZOFRAN    60 tablet    Take 1 tablet (8 mg) by mouth every 8 hours as needed       OSCAL 500/200 D-3 PO          oxyCODONE 5 MG capsule    OXY-IR    100 capsule    Take 1-2 capsules (5-10 mg) by mouth every 4 hours as needed for moderate to severe pain       * PROCHLORPERAZINE MALEATE PO      Take 10 mg by mouth every 6 hours as needed for nausea or vomiting       * prochlorperazine 10 MG tablet    COMPAZINE    60 tablet    Take 1 tablet (10 mg) by mouth every 6 hours as needed for nausea or vomiting       SENNA CO          TYLENOL PO      Take 500 mg by mouth daily as needed       * Notice:  This list has 2 medication(s) that are the same as other medications prescribed for you. Read the directions carefully, and ask your doctor or other care provider to review them with you.

## 2017-02-07 NOTE — PROGRESS NOTES
"[facility] PHYSICIANS  HEMATOLOGY ONCOLOGY    ONCOLOGY FOLLOWUP NOTE      DIAGNOSIS:  Stage IV non-small cell lung cancer.  Initially presented in 07/2016 with a brain mass, which was resected and was consistent with metastatic adenocarcinoma of the lung.  Negative EGFR and ALK rearrangement studies.  I do not have information about ROS1.  CT scan in 07/2016 did not have any definitive mass in the lung.  PET/CT 08/2015 showed 1 cm mass in the left lower lobe.  Bronchoscopy was negative with the [facility] system.  He proceeded with cisplatin and Alimta for 6 cycles after stereotactic radiation to resection bed and started chemotherapy 09/19/2016.  Imaging after 2 cycles of chemotherapy were consistent with metastatic disease in the bone.  He was started on Zometa and has had 3 cycles of adjuvant cisplatin and Alimta.      TREATMENT:    [id number] with Nivolumab first treatment 11/23/16. Progression on scan 2/6/17.  2/7/2017 Txotere     SUBJECTIVE:  The patient was seen as a followup today. His issues with rash have mostly resolved. His pain is well controlled. We had a detailed discussion about further plan of care.     REVIEW OF SYSTEMS:  A complete review of systems was performed and found to be negative other than pertinent positives mentioned in history of present illness.     Past medical, social histories reviewed.    Meds- Reviewed.     PHYSICAL EXAMINATION:   VITAL SIGNS: /87 mmHg  Pulse 122  Temp(Src) 98.1  F (36.7  C) (Oral)  Ht 1.88 m (6' 2\")  Wt 113.036 kg (249 lb 3.2 oz)  BMI 31.98 kg/m2  SpO2 100%  GENERAL: Sitting comfortably.   HEENT: Pupils are equal. Oropharynx is clear.   NECK: No cervical or supraclavicular lymphadenopathy.   LUNGS: Clear bilaterally.   HEART: S1, S2, regular.   ABDOMEN: Soft, nontender, nondistended, no hepatosplenomegaly.   EXTREMITIES: Warm, well perfused.   NEUROLOGIC: Alert, awake.   SKIN:No rash  LYMPHATICS: No edema.      LABORATORY " DATA:    Recent Labs   Lab Test  01/31/17   0957  01/24/17   1321   NA  134  134   POTASSIUM  4.2  4.4   CHLORIDE  100  100   CO2  26  26   ANIONGAP  8  8   BUN  13  16   CR  0.66  0.66   GLC  95  61*   ROSENDO  9.1  8.9   MAG  1.9  2.3   PHOS  3.9  4.0     Recent Labs   Lab Test  01/31/17   0957  01/24/17   1321  01/17/17   0830   WBC  4.4  4.0  9.7   HGB  9.0*  8.8*  9.2*   PLT  201  278  358   MCV  84  85  84   NEUTROPHIL  73.0  71.3  74.5     Recent Labs   Lab Test  01/31/17   0957  01/24/17   1321  01/17/17   0830   BILITOTAL  0.3  0.3  0.3   ALKPHOS  58  60  61   ALT  17  17  18   AST  20  22  21   ALBUMIN  3.0*  3.1*  3.2*   LDH  402*  489*  407*         Results for orders placed or performed during the hospital encounter of 02/06/17   PET Oncology Whole Body    Narrative    Combined Report of:    PET and CT on  2/6/2017 9:09 AM :    1. PET of the neck, chest, abdomen, and pelvis.  2. PET CT Fusion for Attenuation Correction and Anatomical  Localization:    3. Diagnostic CT scan of the chest, abdomen, and pelvis with  intravenous contrast for interpretation.  4. 3D MIP and PET-CT fused images were processed on an independent  workstation and archived to PACS and reviewed by a radiologist.    Technique:    1. PET: The patient received 15.6 mCi of F-18-FDG; the serum glucose  was 91 prior to administration, body weight was 113 kg. Images were  evaluated in the axial, sagittal, and coronal planes as well as the  rotational whole body MIP. Images were acquired from the Vertex to the  Feet.    UPTAKE WAS MEASURED AT 72 MINUTES.     2. CT: Volumetric acquisition for clinical interpretation of the  chest, abdomen, and pelvis acquired at 3 mm sections . The chest,  abdomen, and pelvis were evaluated at 5 mm sections in bone, soft  tissue, and lung windows.      The patient received 134 cc. Of Isovue 370 intravenously for the  examination.    --    3. 3D MIP and PET-CT fused images were processed on an  independent  workstation and archived to PACS and reviewed by a radiologist.    INDICATION: NSCLC, on ALT-803 (super IL-2 complex) + nivolumab,  restaging, Malignant neoplasm of unspecified part of unspecified  bronchus or lung    ADDITIONAL INFORMATION OBTAINED FROM EMR: none    COMPARISON: 12/23/2016    FINDINGS:     HEAD/NECK:    FDG avid focus involving the right pterygoid plates series 3 image 57  with a max SUV of 13.9. New metabolically active left level 2 lymph  node measuring 9 mm in short axis series 3 image 82. Additional level  2A and level 5 lymph nodes. Otherwise there are no suspicious foci of  FDG within the soft tissues of the neck (please see bone section for  additional findings in the neck)    Postsurgical changes of left frontal mass resection. No evidence for  intracranial hemorrhage, mass effect or midline shift.    The paranasal sinuses are clear. The mastoid air cells are clear.     The mucosal pharyngeal space, the , prevertebral and carotid  spaces are within normal limits.     No masses, mass effect or pathologically enlarged lymph nodes are  evident. The thyroid gland is within normal limits..    CHEST:  1.1 cm left peribronchial hypermetabolic lymph node measures 11 mm a  max SUV of 7 (series 3 image 142), previously 8 mm with a max SUV of  5.4. 1.5 cm right hilar lymph node series 3 image 142 with a max SUV  of 7.6, previously measured 1.1 cm with a max SUV of 3.2. Increased  size of left hilar lymph node series 3 image 13 measuring 1.5 cm with  a max SUV of 11.9, slightly increased compared with a max SUV of 11.3  on the prior study. Left basilar lung mass. Has enlarged now measures  4.0 x 2.2 cm, previously 1.9 x 1.7 cm. It is a max SUV of 15.5.  Increased right subpleural soft tissue mass with FDG avidity.    Heart is not enlarged. No pericardial effusion. No focal  consolidation, pneumothorax or pleural effusion.    ABDOMEN AND PELVIS:  Increased size of bilateral  hypermetabolic adrenal masses, the right  measures 4.6 x 4.1 cm with a max SUV of 15.8, the left measures 3.4 x  2.4 cm with a max SUV of 11.9. These measured 3.5 and 1.5 cm  respectively with a max SUVs of 13.8 and 3.8. There are several new  hypoattenuating lesions within the liver with suspicious FDG uptake  for example in the right lobe series 3 image 162 with a max SUV of  7.2. New retrocaval hypermetabolic lymph node series 3 image 211.    The gallbladder, spleen, pancreas are within normal limits. Simple  appearing cysts in the bilateral kidneys are technically  indeterminate. No abnormally dilated small or large bowel. No free air  or free fluid in the abdomen or pelvis. No pelvic masses.    LOWER EXTREMITIES:   No abnormal soft tissue masses or hypermetabolic lesions in the lower  extremities (please see bones section for osseous lesions in the lower  extremities).    BONES and SOFT TISSUE:  There are multiple hypermetabolic osseous lesions throughout the spine  as well as within the pelvis sternum ribs femurs and humeri. For  example lytic lesion within the vertebral body of T5 with a max SUV of  15 point to previously max SUV of 15.5. Notable hypermetabolic lesions  within the left femoral neck with small lytic lesion seen on CT series  3 image 313.. Additional concerning lesions include FDG avid lesion on  the left at C1 with soft tissue involvement of the spinal canal as  well as soft tissue lesion at C7 on the right with involvement of the  neural foramen. There are multiple soft tissue lesions, the most  notable is anterior to the sternum series 3 image 161.      Impression    IMPRESSION: In this patient with a history of non-small cell lung  carcinoma;  1. There is been progression of disease as evidenced by increased size  of presumed primary mass in the left lung base as well as increased  size and number of metastatic lesions in the mediastinum/rosalee as well  as adrenal glands. There are  innumerable bony metastases.  2. Osseous and soft tissue lesions, for example at C1 on the left and  C7 on the right have probable involvement of the spinal canal and  neural foramen respectively. MRI of the cervical spine is recommended  for further evaluation.  3. There are hypermetabolic bony lesions within the femoral necks as  well as the surgical necks of the humeri bilaterally. These presumably  places the patient at risk for pathologic fractures.  4. FDG avid lesion in the region of the right pterygoid plates  5. Post surgical changes of left frontal lobe mass resection without  evidence for intracranial mass, hemorrhage or midline shift.       ECOG performance status 1.      ASSESSMENT:   A 41-year-old gentleman with stage IV adenocarcinoma of the lung, initially presented with a left frontal brain mass which was resected and had radiation, eventually was diagnosed with bone metastases after 2 cycles of adjuvant cisplatin and pemetrexed and has had 3 cycles of cisplatin and pemetrexed and was started on ometa as well.   He is on CEU082 trial which is testing nivolumab in combination with an IL-15 super agonist.    PET scan was performed 12/23/16 showed progression at multiple sites. He had palliative radiation to painful bone metastasis. We decided to continue the treatment under trial and repeat a scan in few weeks given his good performance status and potential element of pseudoprogression.  He was screened for MIRATI and STARTRK study as well. He was not a candidate for Mirati and very less likely to be candidate for STARTRK.  - We reviewed PET scan and images from 2/6/17- progression at multiple sites.  - I discussed about option of switching to chemotherapy. Discussed about Taxotere with palliative intent, potential side effects and risks and benefits. He is willing to proceed.  - He will start treatment soon. I will see him after his first cycle to assess his tolerance.      PLAN:  Chemo education-  Taxotere every three weeks  Start chemotherapy this week or early next week  RTC MD a week after starting chemotherapy  EZEQUIEL MORGAN MD    2/7/2017

## 2017-02-08 NOTE — PROCEDURES
Radiotherapy Treatment Summary          Date of Report: 2017     PATIENT: OBDULIA CHIANG  MEDICAL RECORD NO: [id number]  : 1975     DIAGNOSIS: C34.32 Malignant neoplasm of lower lobe, left bronchus or lung  INTENT OF RADIOTHERAPY: Palliative  PATHOLOGY:  Non-small cell lung cancer                                 STAGE: IV (Metastatic)                          Details of the treatments summarized below are found in records kept in the Department of Radiation Oncology at [facility].     Treatment Summary:  Radiation Oncology - Course: 1 Protocol:   Treatment Site Dose  Modality From  To  Days Fx.  2 Thoracic Spine  2,000 cGy 10x/18x  1/16/2017  2017   4  5  3 Pelvis   2,000 cGy 18 X   1/16/2017  2017   4  5  4 Lt Hip   2,000 cGy 18 X   1/16/2017  2017   4  5         Dose per Fraction:  400  cGy/fx to all sites      Total Dose: 2,000 cGy to all sites              COMMENTS:   Mr. Chiang is a 41 year old gentleman with metastatic non-small cell lung cancer, with   diffusely metastatic disease most significantly in the axial and appendicular skeleton. He has painful lesions   causing hip and low back pain. He is currently on clinical trial utilizing OVN959 (an IL-15 superagonist) and   nivolumab, but has recently been noted to have progression by imaging findings. We recommended palliative   radiotherapy to the T-spine, pelvis and left hip. This was delivered as detailed above. There were no unplanned   treatment breaks. He developed grade 1 nausea by CTCAE v4.0. He also reported improvement in his pain by   the completion of radiotherapy.                PAIN MANAGEMENT: Well controlled at completion of radiotherapy. Pain medication will be managed by   medical oncology going forward.                             FOLLOW UP PLAN: Follow up with Dr. Bartholomew as planned. RTC in radiation oncology on a PRN basis.               Resident Physician: Ahsan Castro M.D.   Staff Physician: TEQUILA Harmon  SCAR Kovacs.  Physicist: Girish Rubalcava, PhD     CC: Nenita Bartholomew MD                                    Radiation Oncology:  [facility] [address] [place] [address]

## 2017-02-09 NOTE — PROGRESS NOTES
Infusion Nursing Note:  Naren Kimble presents today for Cycle 1 Day 1 Taxotere .        Intravenous Access:  Peripheral IV placed.    Treatment Conditions:  HGB      9.9   2/9/2017  WBC      7.4   2/9/2017   ANEU      6.2   2/9/2017  PLT      352   2/9/2017   NA      134   1/31/2017                POTASSIUM      4.2   1/31/2017        MAG      1.9   1/31/2017         CR     0.66   1/31/2017                ROSENDO      9.1   1/31/2017             BILITOTAL      0.2   2/9/2017        ALBUMIN      3.3   2/9/2017                 ALT       18   2/9/2017        AST       22   2/9/2017  Results reviewed, labs MET treatment parameters, ok to proceed with treatment.    Note: Pt confirmed that he started decadron last night and will continue to take it as prescribed.      Post Infusion Assessment:  Patient tolerated infusion without incident.    Discharge Plan:   Pt picked up ativan and decadron from pharmacy prior to discharge. Pt has compazine at home.  Copy of AVS offered to patient and/or family.  Patient will return 3/2/17 for cycle 2 day 1.  Pt sees Dr Mike on 2/14/17.  Face to Face time: 0.    Judith Husain RN

## 2017-02-09 NOTE — NURSING NOTE
Chief Complaint   Patient presents with     Blood Draw     venipuncture     Vitals done and labs drawn, see flow sheets.  TAO BURTON, CMA

## 2017-02-14 NOTE — NURSING NOTE
"Naren Kimble is a 41 year old male who presents for:  Chief Complaint   Patient presents with     Oncology Clinic Visit     Lung Ca f/u- refill on Oxycodone- Steroids helping back        Initial Vitals:  /78 (BP Location: Left arm, Patient Position: Chair, Cuff Size: Adult Regular)  Pulse 123  Temp 97.9  F (36.6  C) (Oral)  Resp 18  Ht 1.88 m (6' 2.02\")  Wt 107.3 kg (236 lb 9.6 oz)  SpO2 98%  BMI 30.36 kg/m2 Estimated body mass index is 30.36 kg/(m^2) as calculated from the following:    Height as of this encounter: 1.88 m (6' 2.02\").    Weight as of this encounter: 107.3 kg (236 lb 9.6 oz).. Body surface area is 2.37 meters squared. BP completed using cuff size: regular  Data Unavailable No LMP for male patient. Allergies and medications reviewed.     Medications: MEDICATION REFILLS NEEDED TODAY.  Pharmacy name entered into [email or web address]:    [facility] [place], [place] - [address] [place] [address] [phone]  [facility] [address] [place] [address] [address]    Comments: Need refill for oxycodone. Steroids has been working to help back pain.     7 minutes for nursing intake (face to face time)   Kadie Diana LPN      "

## 2017-02-14 NOTE — MR AVS SNAPSHOT
After Visit Summary   2/14/2017    Naren Kimble    MRN: [id number]           Patient Information     Date Of Birth          1975        Visit Information        Provider Department      2/14/2017 11:00 AM Nenita Bartholomew MD [facility]        Today's Diagnoses     Non-small cell lung cancer (NSCLC) (H)    -  1    Left-sided low back pain without sciatica, unspecified chronicity           Follow-ups after your visit        Your next 10 appointments already scheduled     Mar 02, 2017  9:00 AM CST   Masonic Lab Draw with UC MASONIC LAB DRAW   [facility] Draw ([facility])    [address] [place] [address]   [phone]            Mar 02, 2017  9:30 AM CST   (Arrive by 9:15 AM)   Return Visit with DANIA Stallworth CNP   [facility] ([facility])    [address] [place] [address]   [phone]            Mar 02, 2017 11:00 AM CST   Infusion 120 with  ONCOLOGY INFUSION, UC 18 ATC   [facility] ([facility])    [address] [place] [id number]   [phone]            Mar 20, 2017  9:40 AM CDT   (Arrive by 9:25 AM)   CT CHEST/ABDOMEN/PELVIS W CONTRAST with UCCT2   [facility] [place] [facility] ([facility])    [address] [place] [id number]   [phone]           Please bring any scans or X-rays taken at other hospitals, if similar tests were done. Also bring a list of your medicines, including vitamins, minerals and over-the-counter drugs. It is safest to leave personal items at home.  Be sure to tell your doctor:   If you have any allergies.   If there s any chance you are pregnant.   If you are breastfeeding.   If you have any special needs.  You may have contrast for this exam. To prepare:   Do not eat or drink for 2 hours  before your exam. If you need to take medicine, you may take it with small sips of water. (We may ask you to take liquid medicine as well.)   The day before your exam, drink extra fluids at least six 8-ounce glasses (unless your doctor tells you to restrict your fluids).  Patients over 70 or patients with diabetes or kidney problems:   If you haven t had a blood test (creatinine test) within the last 30 days, go to your clinic or Diagnostic Imaging Department for this test.  If you have diabetes:   If your kidney function is normal, continue taking your metformin (Avandamet, Glucophage, Glucovance, Metaglip) on the day of your exam.   If your kidney function is abnormal, wait 48 hours before restarting this medicine.  You will have oral contrast for this exam:   You will drink the contrast at home. Get this from your clinic or Diagnostic Imaging Department. Please follow the directions given.  Please wear loose clothing, such as a sweat suit or jogging clothes. Avoid snaps, zippers and other metal. We may ask you to undress and put on a hospital gown.  If you have any questions, please call the Imaging Department where you will have your exam.            Mar 21, 2017  8:30 AM CDT   Masonic Lab Draw with  MASONIC LAB DRAW   [facility] Masonic Lab Draw ([facility])    [address] [place] [address]   [phone]            Mar 21, 2017  9:00 AM CDT   (Arrive by 8:45 AM)   Return Visit with Nenita Bartholomew MD   [facility] ([facility])    [address] [place] [address]   [phone]            Mar 23, 2017  8:30 AM CDT   Masonic Lab Draw with  MASONIC LAB DRAW   [facility] Mason[facility] Lab Draw ([facility])    [address] [place] [address]   [phone]            Mar 23, 2017  9:00 AM CDT   Infusion 120 with UC ONCOLOGY INFUSION,  25 ATC   M  "[facility] ([facility])    [address]  2nd [address]  [facility] [address] [phone]              Future tests that were ordered for you today     Open Future Orders        Priority Expected Expires Ordered    CT Chest/Abdomen/Pelvis w Contrast Routine  2/14/2018 2/14/2017            Who to contact     If you have questions or need follow up information about today's clinic visit or your schedule please contact [facility] directly at [phone].  Normal or non-critical lab and imaging results will be communicated to you by FwdHealthhart, letter or phone within 4 business days after the clinic has received the results. If you do not hear from us within 7 days, please contact the clinic through zanda or phone. If you have a critical or abnormal lab result, we will notify you by phone as soon as possible.  Submit refill requests through zanda or call your pharmacy and they will forward the refill request to us. Please allow 3 business days for your refill to be completed.          Additional Information About Your Visit        FwdHealthharHumedics Information     zanda gives you secure access to your electronic health record. If you see a primary care provider, you can also send messages to your care team and make appointments. If you have questions, please call your primary care clinic.  If you do not have a primary care provider, please call [phone] and they will assist you.        Care EveryWhere ID     This is your Care EveryWhere ID. This could be used by other organizations to access your [place] medical records  [id number]        Your Vitals Were     Pulse Temperature Respirations Height Pulse Oximetry BMI (Body Mass Index)    123 97.9  F (36.6  C) (Oral) 18 1.88 m (6' 2.02\") 98% 30.36 kg/m2       Blood Pressure from Last 3 Encounters:   02/14/17 117/78   02/09/17 141/84   02/07/17 124/87    Weight from Last 3 Encounters:   02/14/17 " 107.3 kg (236 lb 9.6 oz)   02/09/17 109.6 kg (241 lb 9.6 oz)   02/07/17 113 kg (249 lb 3.2 oz)                 Today's Medication Changes          These changes are accurate as of: 2/14/17 11:48 AM.  If you have any questions, ask your nurse or doctor.               These medicines have changed or have updated prescriptions.        Dose/Directions    * oxyCODONE 5 MG capsule   Commonly known as:  OXY-IR   This may have changed:  Another medication with the same name was added. Make sure you understand how and when to take each.   Used for:  Left-sided low back pain without sciatica, unspecified chronicity, Non-small cell lung cancer (NSCLC) (H)   Changed by:  Nenita Bartholomew MD        Dose:  5-10 mg   Take 1-2 capsules (5-10 mg) by mouth every 4 hours as needed for moderate to severe pain   Quantity:  150 capsule   Refills:  0       * oxyCODONE 10 MG 12 hr tablet   Commonly known as:  OxyCONTIN   This may have changed:  You were already taking a medication with the same name, and this prescription was added. Make sure you understand how and when to take each.   Used for:  Non-small cell lung cancer (NSCLC) (H)   Changed by:  Nenita Bartholomew MD        Dose:  10 mg   Take 1 tablet (10 mg) by mouth At Bedtime maximum 1tablet(s) per day   Quantity:  30 tablet   Refills:  0       * PROCHLORPERAZINE MALEATE PO   This may have changed:  Another medication with the same name was removed. Continue taking this medication, and follow the directions you see here.   Changed by:  Nenita Bartholomew MD        Dose:  10 mg   Take 10 mg by mouth every 6 hours as needed for nausea or vomiting   Refills:  0       * prochlorperazine 10 MG tablet   Commonly known as:  COMPAZINE   This may have changed:  Another medication with the same name was removed. Continue taking this medication, and follow the directions you see here.   Used for:  Non-small cell lung cancer (NSCLC) (H)        Dose:  10 mg   Take 1 tablet (10 mg) by mouth every 6 hours as  needed (Nausea/Vomiting)   Quantity:  30 tablet   Refills:  5       * Notice:  This list has 4 medication(s) that are the same as other medications prescribed for you. Read the directions carefully, and ask your doctor or other care provider to review them with you.      Stop taking these medicines if you haven't already. Please contact your care team if you have questions.     diphenhydrAMINE 25 MG tablet   Commonly known as:  BENADRYL   Stopped by:  Nenita Bartholomew MD           diphenhydrAMINE-acetaminophen  MG tablet   Commonly known as:  TYLENOL PM   Stopped by:  Nenita Bartholomew MD           loratadine 10 MG tablet   Commonly known as:  CLARITIN   Stopped by:  Nenita Bartholomew MD                Where to get your medicines      Some of these will need a paper prescription and others can be bought over the counter.  Ask your nurse if you have questions.     Bring a paper prescription for each of these medications     oxyCODONE 10 MG 12 hr tablet    oxyCODONE 5 MG capsule                Primary Care Provider    None Specified       No primary provider on file.        Thank you!     Thank you for choosing [facility]  for your care. Our goal is always to provide you with excellent care. Hearing back from our patients is one way we can continue to improve our services. Please take a few minutes to complete the written survey that you may receive in the mail after your visit with us. Thank you!             Your Updated Medication List - Protect others around you: Learn how to safely use, store and throw away your medicines at www.disposemymeds.org.          This list is accurate as of: 2/14/17 11:48 AM.  Always use your most recent med list.                   Brand Name Dispense Instructions for use    albuterol 108 (90 BASE) MCG/ACT Inhaler   Generic drug:  albuterol      Inhale 1-2 puffs into the lungs daily as needed       cyclobenzaprine 10 MG tablet    FLEXERIL     Take 10 mg by mouth        dexamethasone 4 MG tablet    DECADRON    12 tablet    Take 2 tablets (8 mg) by mouth 2 times daily (with meals) for 6 doses Begin the evening prior to Docetaxel, and on the morning of Docetaxel, then continue for 4 additional doses.       famotidine 10 MG tablet    PEPCID    20 tablet    Take 2 tablets (20 mg) by mouth 2 times daily       fentaNYL 25 mcg/hr 72 hr patch    DURAGESIC    10 patch    Place 1 patch onto the skin every 72 hours       FOLIC ACID PO      Take 800 mcg by mouth daily       IBUPROFEN PO      Take 1 tablet by mouth every 8 hours as needed       levETIRAcetam 100 MG/ML solution    KEPPRA     Take 10 mg/kg by mouth 2 times daily       lidocaine 5 % Patch    LIDODERM    30 patch    Apply up to 3 patches to painful area at once for up to 12 h within a 24 h period.  Remove after 12 hours.       LORazepam 0.5 MG tablet    ATIVAN    30 tablet    Take 1 tablet (0.5 mg) by mouth every 4 hours as needed (Anxiety, Nausea/Vomiting or Sleep)       magnesium hydroxide 2400 MG/10ML Susp    MOM     Take 5 mLs by mouth daily as needed for constipation       ondansetron 8 MG tablet    ZOFRAN    60 tablet    Take 1 tablet (8 mg) by mouth every 8 hours as needed       OSCAL 500/200 D-3 PO          * oxyCODONE 5 MG capsule    OXY-IR    150 capsule    Take 1-2 capsules (5-10 mg) by mouth every 4 hours as needed for moderate to severe pain       * oxyCODONE 10 MG 12 hr tablet    OxyCONTIN    30 tablet    Take 1 tablet (10 mg) by mouth At Bedtime maximum 1tablet(s) per day       * PROCHLORPERAZINE MALEATE PO      Take 10 mg by mouth every 6 hours as needed for nausea or vomiting       * prochlorperazine 10 MG tablet    COMPAZINE    30 tablet    Take 1 tablet (10 mg) by mouth every 6 hours as needed (Nausea/Vomiting)       SENNA CO          TYLENOL PO      Take 500 mg by mouth daily as needed       * Notice:  This list has 4 medication(s) that are the same as other medications prescribed for you. Read the directions  carefully, and ask your doctor or other care provider to review them with you.

## 2017-02-14 NOTE — LETTER
"2/14/2017       RE: Naren Kimble  [address]  [facility] [address]     Dear Colleague,    Thank you for referring your patient, Naren Kimble, to the [facility]. Please see a copy of my visit note below.    [facility]  HEMATOLOGY ONCOLOGY    ONCOLOGY FOLLOWUP NOTE      DIAGNOSIS:  Stage IV non-small cell lung cancer.  Initially presented in 07/2016 with a brain mass, which was resected and was consistent with metastatic adenocarcinoma of the lung.  Negative EGFR and ALK rearrangement studies.  I do not have information about ROS1.  CT scan in 07/2016 did not have any definitive mass in the lung.  PET/CT 08/2015 showed 1 cm mass in the left lower lobe.  Bronchoscopy was negative with the [facility] system.  He proceeded with cisplatin and Alimta for 6 cycles after stereotactic radiation to resection bed and started chemotherapy 09/19/2016.  Imaging after 2 cycles of chemotherapy were consistent with metastatic disease in the bone.  He was started on Zometa and has had 3 cycles of adjuvant cisplatin and Alimta.      TREATMENT:    [id number] with Nivolumab first treatment 11/23/16. Progression on scan 2/6/17.  2/7/2017 Txotere     SUBJECTIVE:  The patient was seen as a followup today. He is tolerating Taxotere well. He had some nausea which is better now. Pain control in not optimal.     REVIEW OF SYSTEMS:  A complete review of systems was performed and found to be negative other than pertinent positives mentioned in history of present illness.     Past medical, social histories reviewed.    Meds- Reviewed.     PHYSICAL EXAMINATION:   VITAL SIGNS: /78 (BP Location: Left arm, Patient Position: Chair, Cuff Size: Adult Regular)  Pulse 123  Temp 97.9  F (36.6  C) (Oral)  Resp 18  Ht 1.88 m (6' 2.02\")  Wt 107.3 kg (236 lb 9.6 oz)  SpO2 98%  BMI 30.36 kg/m2  GENERAL: Sitting comfortably.   HEENT: Pupils are equal. Oropharynx is clear.   NECK: No cervical or " supraclavicular lymphadenopathy.   LUNGS: Clear bilaterally.   HEART: S1, S2, regular.   ABDOMEN: Soft, nontender, nondistended, no hepatosplenomegaly.   EXTREMITIES: Warm, well perfused.   NEUROLOGIC: Alert, awake.   SKIN:No rash  LYMPHATICS: No edema.      LABORATORY DATA:    Recent Labs   Lab Test  01/31/17   0957  01/24/17   1321   NA  134  134   POTASSIUM  4.2  4.4   CHLORIDE  100  100   CO2  26  26   ANIONGAP  8  8   BUN  13  16   CR  0.66  0.66   GLC  95  61*   ROSENDO  9.1  8.9   MAG  1.9  2.3   PHOS  3.9  4.0     Recent Labs   Lab Test  02/09/17   1006  01/31/17   0957  01/24/17   1321   WBC  7.4  4.4  4.0   HGB  9.9*  9.0*  8.8*   PLT  352  201  278   MCV  84  84  85   NEUTROPHIL  84.4  73.0  71.3     Recent Labs   Lab Test  02/09/17   1006  01/31/17   0957  01/24/17   1321  01/17/17   0830   BILITOTAL  0.2  0.3  0.3  0.3   ALKPHOS  66  58  60  61   ALT  18  17  17  18   AST  22  20  22  21   ALBUMIN  3.3*  3.0*  3.1*  3.2*   LDH   --   402*  489*  407*       ECOG performance status 1.      ASSESSMENT:   A 41-year-old gentleman with stage IV adenocarcinoma of the lung, initially presented with a left frontal brain mass which was resected and had radiation, eventually was diagnosed with bone metastases after 2 cycles of adjuvant cisplatin and pemetrexed and has had 3 cycles of cisplatin and pemetrexed and was started on ometa as well.   He is on [id number] trial which is testing nivolumab in combination with an IL-15 super agonist.    PET scan was performed 12/23/16 showed progression at multiple sites. He had palliative radiation to painful bone metastasis.   He was screened for MIRATI and STARTRK study as well. He was not a candidate for Mirati and very less likely to be candidate for STARTRK.  PET scan and images from 2/6/17- progression at multiple sites.  - He is on palliative intent taxotere.   - I have added Oxycontin 10 mg qhs for better pain control. He will also continue PRN oxycodone.      PLAN:  1-  ContinueTaxotere every three weeks, cycle 2 on 3/2  2- RTC 3/20/17  3- CT scan chest abdomen and pelvis before next visit  EZEQUIEL MORGAN MD    2/14/2017

## 2017-02-14 NOTE — PROGRESS NOTES
"[facility] PHYSICIANS  HEMATOLOGY ONCOLOGY    ONCOLOGY FOLLOWUP NOTE      DIAGNOSIS:  Stage IV non-small cell lung cancer.  Initially presented in 07/2016 with a brain mass, which was resected and was consistent with metastatic adenocarcinoma of the lung.  Negative EGFR and ALK rearrangement studies.  I do not have information about ROS1.  CT scan in 07/2016 did not have any definitive mass in the lung.  PET/CT 08/2015 showed 1 cm mass in the left lower lobe.  Bronchoscopy was negative with the [facility] system.  He proceeded with cisplatin and Alimta for 6 cycles after stereotactic radiation to resection bed and started chemotherapy 09/19/2016.  Imaging after 2 cycles of chemotherapy were consistent with metastatic disease in the bone.  He was started on Zometa and has had 3 cycles of adjuvant cisplatin and Alimta.      TREATMENT:    [id number] with Nivolumab first treatment 11/23/16. Progression on scan 2/6/17.  2/7/2017 Txotere     SUBJECTIVE:  The patient was seen as a followup today. He is tolerating Taxotere well. He had some nausea which is better now. Pain control in not optimal.     REVIEW OF SYSTEMS:  A complete review of systems was performed and found to be negative other than pertinent positives mentioned in history of present illness.     Past medical, social histories reviewed.    Meds- Reviewed.     PHYSICAL EXAMINATION:   VITAL SIGNS: /78 (BP Location: Left arm, Patient Position: Chair, Cuff Size: Adult Regular)  Pulse 123  Temp 97.9  F (36.6  C) (Oral)  Resp 18  Ht 1.88 m (6' 2.02\")  Wt 107.3 kg (236 lb 9.6 oz)  SpO2 98%  BMI 30.36 kg/m2  GENERAL: Sitting comfortably.   HEENT: Pupils are equal. Oropharynx is clear.   NECK: No cervical or supraclavicular lymphadenopathy.   LUNGS: Clear bilaterally.   HEART: S1, S2, regular.   ABDOMEN: Soft, nontender, nondistended, no hepatosplenomegaly.   EXTREMITIES: Warm, well perfused.   NEUROLOGIC: Alert, awake.   SKIN:No " rash  LYMPHATICS: No edema.      LABORATORY DATA:    Recent Labs   Lab Test  01/31/17   0957  01/24/17   1321   NA  134  134   POTASSIUM  4.2  4.4   CHLORIDE  100  100   CO2  26  26   ANIONGAP  8  8   BUN  13  16   CR  0.66  0.66   GLC  95  61*   ROSENDO  9.1  8.9   MAG  1.9  2.3   PHOS  3.9  4.0     Recent Labs   Lab Test  02/09/17   1006  01/31/17   0957  01/24/17   1321   WBC  7.4  4.4  4.0   HGB  9.9*  9.0*  8.8*   PLT  352  201  278   MCV  84  84  85   NEUTROPHIL  84.4  73.0  71.3     Recent Labs   Lab Test  02/09/17   1006  01/31/17   0957  01/24/17   1321  01/17/17   0830   BILITOTAL  0.2  0.3  0.3  0.3   ALKPHOS  66  58  60  61   ALT  18  17  17  18   AST  22  20  22  21   ALBUMIN  3.3*  3.0*  3.1*  3.2*   LDH   --   402*  489*  407*       ECOG performance status 1.      ASSESSMENT:   A 41-year-old gentleman with stage IV adenocarcinoma of the lung, initially presented with a left frontal brain mass which was resected and had radiation, eventually was diagnosed with bone metastases after 2 cycles of adjuvant cisplatin and pemetrexed and has had 3 cycles of cisplatin and pemetrexed and was started on ometa as well.   He is on [id number] trial which is testing nivolumab in combination with an IL-15 super agonist.    PET scan was performed 12/23/16 showed progression at multiple sites. He had palliative radiation to painful bone metastasis.   He was screened for MIRATI and STARTRK study as well. He was not a candidate for Mirati and very less likely to be candidate for STARTRK.  PET scan and images from 2/6/17- progression at multiple sites.  - He is on palliative intent taxotere.   - I have added Oxycontin 10 mg qhs for better pain control. He will also continue PRN oxycodone.      PLAN:  1- ContinueTaxotere every three weeks, cycle 2 on 3/2  2- RTC 3/20/17  3- CT scan chest abdomen and pelvis before next visit  EZEQUIEL MORGAN MD    2/14/2017

## 2017-03-02 NOTE — PROGRESS NOTES
Naren Kimble is a 41 year old man with metastatic NSCLC, seen for evaluation prior to cycle 2 Taxotere.    Oncology HPI:    Stage IV non-small cell lung cancer.  Initially presented in 07/2016 with a brain mass, which was resected and was consistent with metastatic adenocarcinoma of the lung. He was treated with stereotactic brain radiation to the tumor bed.  Negative EGFR and ALK rearrangement studies.   ROS1 studies are not known.  CT scan in 07/2016 did not have any definitive mass in the lung.  PET/CT 08/2015 showed 1 cm mass in the left lower lobe.  Bronchoscopy was negative with the [facility].  He proceeded with cisplatin and Alimta after stereotactic radiation to resection bed and started chemotherapy 09/19/2016.  Imaging after 2 cycles of chemotherapy were consistent with metastatic disease in the bone.  He was started on Zometa. He progressed on cisplatin/alimta and was started on a clinical trial with ALT-803 plus nivolumab. He had increased edema, fevers and skin rash with the first 2 treatments. Because of this he only received Nivolumab on day 15. He proceeded with subsequent treatment without further difficulty. 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 He was initiated on palliative radiation to the T spine, sacrum and left hip 1/16/17- 1/20/17. He had documented progression on 2/6/17 involving the bone. He was initiated on Taxotere on 2/7/17.    Interval history:  About a week after his infusion Valerio felt very tired and had nausea. He controlled the nausea with zofran and compazine. No vomiting. He stayed home from work on the days when his energy was lowest. He started to feel better after about 4 days. Bone pain was better when he was on dexamethasone. Pain flared a little after the dexamethasone was stopped on day 3. Left inguinal/hip pain improved with radiation. He has been taking oxycodone 10 mg every 4 hours to  control the pain. Is using oxycontin 10 mg (without the shortacting oxycodone) at HS. No change to the fentanyl patches. Bowels are regular. He notes his urine stream is slower since being on opioids. No extremity weakness or sensory changes. Has noted new numbness on the scalp behind the right ear. Has noted an increased lymph node in the left posterior neck, when he pushes on it, he gets a sensation in the same area as the numbness. Has occasional mild headaches. No vision changes. No other focal neurologic concerns. No fevers/chills. No change to breathing. No CP/cough. No skin rash, bruising or edema.      Current Outpatient Prescriptions:      albuterol (ALBUTEROL) 108 (90 BASE) MCG/ACT Inhaler, Inhale 1-2 puffs into the lungs daily as needed, Disp: , Rfl:      oxyCODONE (OXY-IR) 5 MG capsule, Take 1-2 capsules (5-10 mg) by mouth every 4 hours as needed for moderate to severe pain, Disp: 150 capsule, Rfl: 0     oxyCODONE (OXYCONTIN) 10 MG 12 hr tablet, Take 1 tablet (10 mg) by mouth At Bedtime maximum 1tablet(s) per day, Disp: 30 tablet, Rfl: 0     LORazepam (ATIVAN) 0.5 MG tablet, Take 1 tablet (0.5 mg) by mouth every 4 hours as needed (Anxiety, Nausea/Vomiting or Sleep), Disp: 30 tablet, Rfl: 5     prochlorperazine (COMPAZINE) 10 MG tablet, Take 1 tablet (10 mg) by mouth every 6 hours as needed (Nausea/Vomiting), Disp: 30 tablet, Rfl: 5     dexamethasone (DECADRON) 4 MG tablet, Take 2 tablets (8 mg) by mouth 2 times daily (with meals) for 6 doses Begin the evening prior to Docetaxel, and on the morning of Docetaxel, then continue for 4 additional doses., Disp: 12 tablet, Rfl: 7     IBUPROFEN PO, Take 1 tablet by mouth every 8 hours as needed , Disp: , Rfl:      SENNA CO, , Disp: , Rfl:      Calcium Carbonate-Vitamin D (OSCAL 500/200 D-3 PO), , Disp: , Rfl:      cyclobenzaprine (FLEXERIL) 10 MG tablet, Take 10 mg by mouth, Disp: , Rfl:      ondansetron (ZOFRAN) 8 MG tablet, Take 1 tablet (8 mg) by mouth every  "8 hours as needed, Disp: 60 tablet, Rfl: 3     lidocaine (LIDODERM) 5 % Patch, Apply up to 3 patches to painful area at once for up to 12 h within a 24 h period.  Remove after 12 hours., Disp: 30 patch, Rfl: 0     Acetaminophen (TYLENOL PO), Take 500 mg by mouth daily as needed , Disp: , Rfl:      famotidine (PEPCID) 10 MG tablet, Take 2 tablets (20 mg) by mouth 2 times daily, Disp: 20 tablet, Rfl: 1     levETIRAcetam (KEPPRA) 100 MG/ML solution, Take 10 mg/kg by mouth 2 times daily, Disp: , Rfl:      FOLIC ACID PO, Take 800 mcg by mouth daily, Disp: , Rfl:      fentaNYL (DURAGESIC) 25 mcg/hr 72 hr patch, Place 1 patch onto the skin every 72 hours (Patient not taking: Reported on 3/2/2017), Disp: 10 patch, Rfl: 0     magnesium hydroxide (MOM) 2400 MG/10ML SUSP, Take 5 mLs by mouth daily as needed for constipation Reported on 3/2/2017, Disp: , Rfl:     Exam: Alert, appears in NAD. Blood pressure 137/89, pulse 109, temperature 98  F (36.7  C), temperature source Oral, height 1.88 m (6' 2.02\"), weight 106.5 kg (234 lb 11.2 oz), SpO2 100 %.    Oropharynx is moist, no focal lesion. No icterus. Neck supple. Firm 1-2 cm right posterior cervical node. Lungs:CTA. Heart:RRR, no murmur or rub. Abdomen: soft, nontender, BS active. No masses or organomegaly. Extremities: warm, no edema. Speech is clear. Gait steady. Upper/lower extremity strength and sensation grossly normal. Has an area of decreased sensation on the right parietal scalp measuring about 2 cm in width and 4 cm in length. No skin rash noted.    Labs:  Results for OBDULIA CHIANG (MRN [id number]) as of 3/2/2017 09:34   Ref. Range 2/9/2017 10:06   Albumin Latest Ref Range: 3.4 - 5.0 g/dL 3.3 (L)   Protein Total Latest Ref Range: 6.8 - 8.8 g/dL 9.2 (H)   Bilirubin Total Latest Ref Range: 0.2 - 1.3 mg/dL 0.2   Alkaline Phosphatase Latest Ref Range: 40 - 150 U/L 66   ALT Latest Ref Range: 0 - 70 U/L 18   AST Latest Ref Range: 0 - 45 U/L 22   Bilirubin Direct Latest " Ref Range: 0.0 - 0.2 mg/dL <0.1   WBC Latest Ref Range: 4.0 - 11.0 10e9/L 7.4   Hemoglobin Latest Ref Range: 13.3 - 17.7 g/dL 9.9 (L)   Hematocrit Latest Ref Range: 40.0 - 53.0 % 30.8 (L)   Platelet Count Latest Ref Range: 150 - 450 10e9/L 352   RBC Count Latest Ref Range: 4.4 - 5.9 10e12/L 3.69 (L)   MCV Latest Ref Range: 78 - 100 fl 84   MCH Latest Ref Range: 26.5 - 33.0 pg 26.8   MCHC Latest Ref Range: 31.5 - 36.5 g/dL 32.1   RDW Latest Ref Range: 10.0 - 15.0 % 14.9   Diff Method Unknown Automated Method   % Neutrophils Latest Units: % 84.4   % Lymphocytes Latest Units: % 8.6   % Monocytes Latest Units: % 6.0   % Eosinophils Latest Units: % 0.0   % Basophils Latest Units: % 0.3   % Immature Granulocytes Latest Units: % 0.7   Nucleated RBCs Latest Ref Range: 0 /100 0   Absolute Neutrophil Latest Ref Range: 1.6 - 8.3 10e9/L 6.2   Absolute Lymphocytes Latest Ref Range: 0.8 - 5.3 10e9/L 0.6 (L)   Absolute Monocytes Latest Ref Range: 0.0 - 1.3 10e9/L 0.4   Absolute Eosinophils Latest Ref Range: 0.0 - 0.7 10e9/L 0.0   Absolute Basophils Latest Ref Range: 0.0 - 0.2 10e9/L 0.0   Abs Immature Granulocytes Latest Ref Range: 0 - 0.4 10e9/L 0.1   Absolute Nucleated RBC Unknown 0.0       Impression/plan:  1. Metastatic NSCLC, currently on taxotere after recent progression on    -Has tolerated the taxotere fairly well. Managed the fatigue and nausea well. Will proceed with cycle 2 today.  -Has restaging CT CAP with Dr. Bartholomew prior to cycle 3    2. Normochromic, normocytic anemia: secondary to chemotherapy. Discussed s/s of anemia. No transfusion needs at this time.    3. Pain secondary to bone metastases:  -Reviewed pain medication use. He is taking approximately of 60 mg of oxycodone/24 hours to manage the pain, in addition to baseline fentanyl patch of 25 mcg/72hr. With this, he would tolerate an increase in the fentanyl patch from 25 mcg to 50 mcg/72 hour. OK'd and increase in the oxycodone to 10-15 mg every 4 hours  as needed.    -Also had a bone pain flare after coming off of dexamethasone. Will change to 8 mg bid on the evening before, day of and  Bid x 2 days after docetaxel, then decrease to 4 mg daily x 3 days  -Refills given today.  -Due for zometa on 2/28/17--will give today.    4. Brain metastasis, s/p resection, stereotactic radiation on 9/16/16: stable on MRI from 1/3/17.  -Has new scalp numbness. My highest suspicion is this relates to compression from the right neck node    5. Constipation: stable with prn senna/miralax

## 2017-03-02 NOTE — PATIENT INSTRUCTIONS
[facility] Main Line: [phone]    Call triage nurse with chills and/or temperature greater than or equal to 100.4, uncontrolled nausea/vomiting, diarrhea, constipation, dizziness, shortness of breath, chest pain, bleeding, unexplained bruising, or any new/concerning symptoms, questions/concerns.   If you are having any concerning symptoms or wish to speak to a provider before your next infusion visit, please call your care coordinator or triage to notify them so we can adequately serve you.   Triage Nurse Line: [phone]    If after hours, weekends, or holidays, call main hospital  and ask for Oncology doctor on call @ [phone]               March 2017 Sunday Monday Tuesday Wednesday Thursday Friday Saturday                  1     2     [facility] LAB DRAW    9:00 AM   (15 min.)    [facility] LAB DRAW   [facility] Lab Draw     [facility] RETURN    9:15 AM   (50 min.)   Yulia Lepe, DANIA CNP   [facility] ONC INFUSION 120   11:00 AM   (120 min.)    ONCOLOGY INFUSION   [facility] 3     4       5     6     MR BRAIN W   11:00 AM   (45 min.)   UUMR1   [facility] [place], MRI 7     8     9     10     11       12     13     14     15     16     17     18       19     20     CT CHEST/ABDOMEN/PELVIS W    9:25 AM   (20 min.)   UCCT2   [facility] CT 21     [facility] DRAW    8:30 AM   (15 min.)    [facility] LAB DRAW   [facility] Draw     [facility] RETURN    8:45 AM   (30 min.)   Nenita Bartholomew MD   [facility] 22     23     P [facility] LAB DRAW    8:30 AM   (15 min.)    [facility] LAB DRAW   [facility] Lab Draw     P ONC INFUSION 120    9:00 AM   (120 min.)    ONCOLOGY INFUSION   [facility] 24     25       26     27     28     29     30     31 April 2017 Sunday Monday Tuesday Wednesday Thursday Friday Saturday                                 1       2      3     4     5     6     7     8       9     10     11     12     13     [facility] LAB DRAW   10:00 AM   (15 min.)   [facility] LAB DRAW   [facility] Lab Draw     [facility] ONC INFUSION 120   10:30 AM   (120 min.)    ONCOLOGY INFUSION   [facility] 14     15       16     17     18     19     20     21     22       23     24     25     26     27     28     29       30                                                Lab Results:  Recent Results (from the past 12 hour(s))   CBC with platelets differential    Collection Time: 03/02/17  9:21 AM   Result Value Ref Range    WBC 7.3 4.0 - 11.0 10e9/L    RBC Count 3.39 (L) 4.4 - 5.9 10e12/L    Hemoglobin 8.5 (L) 13.3 - 17.7 g/dL    Hematocrit 27.5 (L) 40.0 - 53.0 %    MCV 81 78 - 100 fl    MCH 25.1 (L) 26.5 - 33.0 pg    MCHC 30.9 (L) 31.5 - 36.5 g/dL    RDW 16.1 (H) 10.0 - 15.0 %    Platelet Count 395 150 - 450 10e9/L    Diff Method Automated Method     % Neutrophils 83.4 %    % Lymphocytes 11.0 %    % Monocytes 4.7 %    % Eosinophils 0.0 %    % Basophils 0.1 %    % Immature Granulocytes 0.8 %    Nucleated RBCs 0 0 /100    Absolute Neutrophil 6.1 1.6 - 8.3 10e9/L    Absolute Lymphocytes 0.8 0.8 - 5.3 10e9/L    Absolute Monocytes 0.3 0.0 - 1.3 10e9/L    Absolute Eosinophils 0.0 0.0 - 0.7 10e9/L    Absolute Basophils 0.0 0.0 - 0.2 10e9/L    Abs Immature Granulocytes 0.1 0 - 0.4 10e9/L    Absolute Nucleated RBC 0.0    Hepatic panel    Collection Time: 03/02/17  9:21 AM   Result Value Ref Range    Bilirubin Direct <0.1 0.0 - 0.2 mg/dL    Bilirubin Total 0.3 0.2 - 1.3 mg/dL    Albumin 3.0 (L) 3.4 - 5.0 g/dL    Protein Total 8.1 6.8 - 8.8 g/dL    Alkaline Phosphatase 69 40 - 150 U/L    ALT 13 0 - 70 U/L    AST 20 0 - 45 U/L   Creatinine    Collection Time: 03/02/17  9:21 AM   Result Value Ref Range    Creatinine 0.63 (L) 0.66 - 1.25 mg/dL    GFR Estimate >90  Non  GFR Calc   >60 mL/min/1.7m2    GFR Estimate If Black >90   GFR  Calc   >60 mL/min/1.7m2   Calcium    Collection Time: 03/02/17  9:21 AM   Result Value Ref Range    Calcium 9.0 8.5 - 10.1 mg/dL

## 2017-03-02 NOTE — MR AVS SNAPSHOT
After Visit Summary   3/2/2017    Naren Kimble    MRN: [id number]           Patient Information     Date Of Birth          1975        Visit Information        Provider Department      3/2/2017 9:30 AM Yulia Lepe APRN CNP [facility]        Today's Diagnoses     Non-small cell lung cancer (NSCLC) (H)        Brain metastasis (H)        Left-sided low back pain without sciatica, unspecified chronicity          Care Instructions    Medication changes:  Dexamethasone 8 mg the PM before docetaxel and the AM prior to docetaxel.  Then twice daily x 2 days after docetaxel, then decrease to 4 mg daily x 3 days, then stop.  Call if pain is flaring and we may decide to continue the steroid.    Fentanyl patch:  Stop the 25 mcg patch  Start 50 mcg patch, change every 72 hours  Change oxycodone to 10-15 mg every 4 hours as needed for pain    Schedule Brain MRI        Follow-ups after your visit        Your next 10 appointments already scheduled     Mar 02, 2017 11:00 AM CST   Infusion 120 with  ONCOLOGY INFUSION, UC 18 ATC   [facility] ([facility])    [address] [place] [address]  [address] [facility] [address] [phone]            Mar 03, 2017  4:45 PM CST   (Arrive by 4:30 PM)   MR BRAIN W CONTRAST with UCMR1   [facility] [place] [facility] ([facility])    [address] [place] [address] [phone]           Take your medicines as usual, unless your doctor tells you not to. Bring a list of your current medicines to your exam (including vitamins, minerals and over-the-counter drugs).  You will be given intravenous contrast for this exam. To prepare:   The day before your exam, drink extra fluids at least six 8-ounce glasses (unless your doctor tells you to restrict your fluids).   Have a blood test (creatinine test) within 30 days of your exam. Go to your clinic or Diagnostic  Imaging Department for this test.  The MRI machine uses a strong magnet. Please wear clothes without metal (snaps, zippers). A sweatsuit works well, or we may give you a hospital gown.  Please remove any body piercings and hair extensions before you arrive. You will also remove watches, jewelry, hairpins, wallets, dentures, partial dental plates and hearing aids. You may wear contact lenses, and you may be able to wear your rings. We have a safe place to keep your personal items, but it is safer to leave them at home.   **IMPORTANT** THE INSTRUCTIONS BELOW ARE ONLY FOR THOSE PATIENTS WHO HAVE BEEN TOLD THEY WILL RECEIVE SEDATION OR GENERAL ANESTHESIA DURING THEIR MRI PROCEDURE:  IF YOU WILL RECEIVE SEDATION (take medicine to help you relax during your exam):   You must get the medicine from your doctor before you arrive. Bring the medicine to the exam. Do not take it at home.   Arrive one hour early. Bring someone who can take you home after the test. Your medicine will make you sleepy. After the exam, you may not drive, take a bus or take a taxi by yourself.   No eating 8 hours before your exam. You may have clear liquids up until 4 hours before your exam. (Clear liquids include water, clear tea, black coffee and fruit juice without pulp.)  IF YOU WILL RECEIVE ANESTHESIA (be asleep for your exam):   Arrive 1 1/2 hours early. Bring someone who can take you home after the test. You may not drive, take a bus or take a taxi by yourself.   No eating 8 hours before your exam. You may have clear liquids up until 4 hours before your exam. (Clear liquids include water, clear tea, black coffee and fruit juice without pulp.)  Please call the Imaging Department at your exam site with any questions.            Mar 20, 2017  9:40 AM CDT   (Arrive by 9:25 AM)   CT CHEST/ABDOMEN/PELVIS W CONTRAST with UCCT2   [facility] [place] [facility] ([facility])    [address] [facility]  [address]st [facility]  [id number] [phone]           Please bring any scans or X-rays taken at other hospitals, if similar tests were done. Also bring a list of your medicines, including vitamins, minerals and over-the-counter drugs. It is safest to leave personal items at home.  Be sure to tell your doctor:   If you have any allergies.   If there s any chance you are pregnant.   If you are breastfeeding.   If you have any special needs.  You may have contrast for this exam. To prepare:   Do not eat or drink for 2 hours before your exam. If you need to take medicine, you may take it with small sips of water. (We may ask you to take liquid medicine as well.)   The day before your exam, drink extra fluids at least six 8-ounce glasses (unless your doctor tells you to restrict your fluids).  Patients over 70 or patients with diabetes or kidney problems:   If you haven t had a blood test (creatinine test) within the last 30 days, go to your clinic or Diagnostic Imaging Department for this test.  If you have diabetes:   If your kidney function is normal, continue taking your metformin (Avandamet, Glucophage, Glucovance, Metaglip) on the day of your exam.   If your kidney function is abnormal, wait 48 hours before restarting this medicine.  You will have oral contrast for this exam:   You will drink the contrast at home. Get this from your clinic or Diagnostic Imaging Department. Please follow the directions given.  Please wear loose clothing, such as a sweat suit or jogging clothes. Avoid snaps, zippers and other metal. We may ask you to undress and put on a hospital gown.  If you have any questions, please call the Imaging Department where you will have your exam.            Mar 21, 2017  8:30 AM CDT   Masonic Lab Draw with  MASONIC LAB DRAW   [facility] Masonic Lab Draw ([facility])    [address] [facility]  2nd Floor  [facility] [address] [phone]            Mar 21, 2017  9:00 AM CDT   (Arrive by 8:45  AM)   Return Visit with Nenita Bartholomew MD   [facility] ([facility])    [address] [address]   [phone]            Mar 23, 2017  8:30 AM CDT   Masonic Lab Draw with UC MASONIC LAB DRAW   [facility] ([facility])    [address] [place] [id number] [phone]            Mar 23, 2017  9:00 AM CDT   Infusion 120 with UC ONCOLOGY INFUSION, UC 25 ATC   [facility] ([facility])    [address] [place] [address] [phone]            Apr 13, 2017 10:00 AM CDT   Masonic Lab Draw with UC MASONIC LAB DRAW   [facility])    [address] [place] [address] [phone]              Future tests that were ordered for you today     Open Future Orders        Priority Expected Expires Ordered    MRI Brain w contrast Routine  5/31/2017 3/2/2017            Who to contact     If you have questions or need follow up information about today's clinic visit or your schedule please contact [facility] directly at [phone].  Normal or non-critical lab and imaging results will be communicated to you by Avtozaperhart, letter or phone within 4 business days after the clinic has received the results. If you do not hear from us within 7 days, please contact the clinic through Avtozaperhart or phone. If you have a critical or abnormal lab result, we will notify you by phone as soon as possible.  Submit refill requests through [employer] or call your pharmacy and they will forward the refill request to us. Please allow 3 business days for your refill to be completed.          Additional Information About Your Visit        Caption Data Information     [employer] gives you secure access to your electronic health record. If you see a primary care  "provider, you can also send messages to your care team and make appointments. If you have questions, please call your primary care clinic.  If you do not have a primary care provider, please call [phone] and they will assist you.        Care EveryWhere ID     This is your Care EveryWhere ID. This could be used by other organizations to access your [facility] medical records  [id number]        Your Vitals Were     Pulse Temperature Height Pulse Oximetry BMI (Body Mass Index)       109 98  F (36.7  C) (Oral) 1.88 m (6' 2.02\") 100% 30.12 kg/m2        Blood Pressure from Last 3 Encounters:   03/02/17 137/89   02/14/17 117/78   02/09/17 141/84    Weight from Last 3 Encounters:   03/02/17 106.5 kg (234 lb 11.2 oz)   02/14/17 107.3 kg (236 lb 9.6 oz)   02/09/17 109.6 kg (241 lb 9.6 oz)                 Today's Medication Changes          These changes are accurate as of: 3/2/17 10:33 AM.  If you have any questions, ask your nurse or doctor.               Start taking these medicines.        Dose/Directions    fentaNYL 50 mcg/hr 72 hr patch   Commonly known as:  DURAGESIC   Used for:  Left-sided low back pain without sciatica, unspecified chronicity, Brain metastasis (H), Non-small cell lung cancer (NSCLC) (H)   Replaces:  fentaNYL 25 mcg/hr 72 hr patch   Started by:  Yulia Lepe APRN CNP        Dose:  1 patch   Place 1 patch onto the skin every 72 hours   Quantity:  10 patch   Refills:  0         These medicines have changed or have updated prescriptions.        Dose/Directions    * dexamethasone 4 MG tablet   Commonly known as:  DECADRON   This may have changed:  Another medication with the same name was added. Make sure you understand how and when to take each.   Used for:  Non-small cell lung cancer (NSCLC) (H)   Changed by:  Nenita Bartholomew MD        Dose:  8 mg   Take 2 tablets (8 mg) by mouth 2 times daily (with meals) for 6 doses Begin the evening prior to Docetaxel, and on the morning of Docetaxel, then " continue for 4 additional doses.   Quantity:  12 tablet   Refills:  7       * dexamethasone 4 MG tablet   Commonly known as:  DECADRON   This may have changed:  You were already taking a medication with the same name, and this prescription was added. Make sure you understand how and when to take each.   Used for:  Brain metastasis (H), Non-small cell lung cancer (NSCLC) (H)   Changed by:  Yulia Lepe APRN CNP        Take 8 mg the evening before, AM of, and bid x 2 days after docetaxel, then decrease to 4 mg daily x 3, then stop. Repeat with each cycle of Taxotere   Quantity:  30 tablet   Refills:  0       * oxyCODONE 10 MG 12 hr tablet   Commonly known as:  OxyCONTIN   This may have changed:  Another medication with the same name was changed. Make sure you understand how and when to take each.   Used for:  Non-small cell lung cancer (NSCLC) (H)   Changed by:  Nenita Bartholomew MD        Dose:  10 mg   Take 1 tablet (10 mg) by mouth At Bedtime maximum 1tablet(s) per day   Quantity:  30 tablet   Refills:  0       * oxyCODONE 5 MG capsule   Commonly known as:  OXY-IR   This may have changed:  how much to take   Used for:  Left-sided low back pain without sciatica, unspecified chronicity, Non-small cell lung cancer (NSCLC) (H)   Changed by:  Yulia Lepe APRN CNP        Dose:  5-15 mg   Take 1-3 capsules (5-15 mg) by mouth every 4 hours as needed for moderate to severe pain   Quantity:  200 capsule   Refills:  0       * Notice:  This list has 4 medication(s) that are the same as other medications prescribed for you. Read the directions carefully, and ask your doctor or other care provider to review them with you.      Stop taking these medicines if you haven't already. Please contact your care team if you have questions.     fentaNYL 25 mcg/hr 72 hr patch   Commonly known as:  DURAGESIC   Replaced by:  fentaNYL 50 mcg/hr 72 hr patch   Stopped by:  Yulia Lepe APRN CNP                Where to get your medicines       These medications were sent to [place], [place] - [address]  [address], [facility] [address]    Hours:  TRANSPLANT PHONE NUMBER [phone] Phone:  [phone]     dexamethasone 4 MG tablet         Some of these will need a paper prescription and others can be bought over the counter.  Ask your nurse if you have questions.     Bring a paper prescription for each of these medications     fentaNYL 50 mcg/hr 72 hr patch    oxyCODONE 5 MG capsule                Primary Care Provider    None Specified       No primary provider on file.        Thank you!     Thank you for choosing [facility]  for your care. Our goal is always to provide you with excellent care. Hearing back from our patients is one way we can continue to improve our services. Please take a few minutes to complete the written survey that you may receive in the mail after your visit with us. Thank you!             Your Updated Medication List - Protect others around you: Learn how to safely use, store and throw away your medicines at www.disposemymeds.org.          This list is accurate as of: 3/2/17 10:33 AM.  Always use your most recent med list.                   Brand Name Dispense Instructions for use    albuterol 108 (90 BASE) MCG/ACT Inhaler   Generic drug:  albuterol      Inhale 1-2 puffs into the lungs daily as needed       cyclobenzaprine 10 MG tablet    FLEXERIL     Take 10 mg by mouth       * dexamethasone 4 MG tablet    DECADRON    12 tablet    Take 2 tablets (8 mg) by mouth 2 times daily (with meals) for 6 doses Begin the evening prior to Docetaxel, and on the morning of Docetaxel, then continue for 4 additional doses.       * dexamethasone 4 MG tablet    DECADRON    30 tablet    Take 8 mg the evening before, AM of, and bid x 2 days after docetaxel, then decrease to 4 mg daily x 3, then stop. Repeat with each cycle of Taxotere       famotidine 10  MG tablet    PEPCID    20 tablet    Take 2 tablets (20 mg) by mouth 2 times daily       fentaNYL 50 mcg/hr 72 hr patch    DURAGESIC    10 patch    Place 1 patch onto the skin every 72 hours       FOLIC ACID PO      Take 800 mcg by mouth daily       IBUPROFEN PO      Take 1 tablet by mouth every 8 hours as needed       levETIRAcetam 100 MG/ML solution    KEPPRA     Take 10 mg/kg by mouth 2 times daily       lidocaine 5 % Patch    LIDODERM    30 patch    Apply up to 3 patches to painful area at once for up to 12 h within a 24 h period.  Remove after 12 hours.       LORazepam 0.5 MG tablet    ATIVAN    30 tablet    Take 1 tablet (0.5 mg) by mouth every 4 hours as needed (Anxiety, Nausea/Vomiting or Sleep)       magnesium hydroxide 2400 MG/10ML Susp    MOM     Take 5 mLs by mouth daily as needed for constipation Reported on 3/2/2017       ondansetron 8 MG tablet    ZOFRAN    60 tablet    Take 1 tablet (8 mg) by mouth every 8 hours as needed       OSCAL 500/200 D-3 PO          * oxyCODONE 10 MG 12 hr tablet    OxyCONTIN    30 tablet    Take 1 tablet (10 mg) by mouth At Bedtime maximum 1tablet(s) per day       * oxyCODONE 5 MG capsule    OXY-IR    200 capsule    Take 1-3 capsules (5-15 mg) by mouth every 4 hours as needed for moderate to severe pain       prochlorperazine 10 MG tablet    COMPAZINE    30 tablet    Take 1 tablet (10 mg) by mouth every 6 hours as needed (Nausea/Vomiting)       SENNA CO          TYLENOL PO      Take 500 mg by mouth daily as needed       * Notice:  This list has 4 medication(s) that are the same as other medications prescribed for you. Read the directions carefully, and ask your doctor or other care provider to review them with you.

## 2017-03-02 NOTE — PROGRESS NOTES
Infusion Nursing Note:  Naren Kimble presents today for Cycle 2 Day 1 Taxotere, Zometa.    Patient seen by provider today: Yes: Yulia Lepe DNP   present during visit today: Not Applicable.    Note: N/A.  Valerio requested that his brain MRI be scheduled for next week, preferably Monday.    Intravenous Access:  Peripheral IV placed.    Treatment Conditions:  Lab Results   Component Value Date    HGB 8.5 03/02/2017     Lab Results   Component Value Date    WBC 7.3 03/02/2017      Lab Results   Component Value Date    ANEU 6.1 03/02/2017     Lab Results   Component Value Date     03/02/2017      Lab Results   Component Value Date     01/31/2017                   Lab Results   Component Value Date    POTASSIUM 4.2 01/31/2017           Lab Results   Component Value Date    MAG 1.9 01/31/2017            Lab Results   Component Value Date    CR 0.63 03/02/2017                   Lab Results   Component Value Date    ROSENDO 9.0 03/02/2017                Lab Results   Component Value Date    BILITOTAL 0.3 03/02/2017           Lab Results   Component Value Date    ALBUMIN 3.0 03/02/2017                    Lab Results   Component Value Date    ALT 13 03/02/2017           Lab Results   Component Value Date    AST 20 03/02/2017     Results reviewed, labs MET treatment parameters, ok to proceed with treatment.    ZOMETA STOP TIME:  1137      Post Infusion Assessment:  Patient tolerated infusion without incident.  Blood return noted pre and post infusion.  Site patent and intact, free from redness, edema or discomfort.  No evidence of extravasations.  Access discontinued per protocol.    Discharge Plan:   Prescription refills given for Fentanyl, Oxycodone.  Copy of AVS reviewed with patient and/or family.  Patient will return 3/6/17 for brain MRI, 3/20/17 for CT, 3/21/17 for labs and MD visit, 3/203 for chemo for next appointment.  Patient discharged in stable condition accompanied by: wife.  Departure Mode:  Ambulatory.  Face to Face time: 0.    Angela Resendiz RN

## 2017-03-02 NOTE — MR AVS SNAPSHOT
After Visit Summary   3/2/2017    Naren Kimble    MRN: [id number]           Patient Information     Date Of Birth          1975        Visit Information        Provider Department      3/2/2017 11:00 AM  18 ATC;  ONCOLOGY INFUSION [facility]        Today's Diagnoses     Non-small cell lung cancer (NSCLC) (H)    -  1      Care [facility] Main Line: [phone]    Call triage nurse with chills and/or temperature greater than or equal to 100.4, uncontrolled nausea/vomiting, diarrhea, constipation, dizziness, shortness of breath, chest pain, bleeding, unexplained bruising, or any new/concerning symptoms, questions/concerns.   If you are having any concerning symptoms or wish to speak to a provider before your next infusion visit, please call your care coordinator or triage to notify them so we can adequately serve you.   Triage Nurse Line: [phone]    If after hours, weekends, or holidays, call main hospital  and ask for Oncology doctor on call @ [phone]               March 2017 Sunday Monday Tuesday Wednesday Thursday Friday Saturday                  1     2     [facility]    9[facility]00 AM   (15 min.)   [facility] LAB DRAW   [facility] Lab Draw     [facility] RETURN    9:15 AM   (50 min.)   Yulia Lepe, DANIA CNP   [facility] ONC INFUSION 120   11:00 AM   (120 min.)    ONCOLOGY INFUSION   [facility] 3     4       5     6     MR BRAIN W   11:00 AM   (45 min.)   UUMR1   [facility] [place], [facility] 7     8     9     10     11       12     13     14     15     16     17     18       19     20     CT CHEST/ABDOMEN/PELVIS W    9:25 AM   (20 min.)   UCCT2   [facility] CT 21     [facility] LAB DRAW    8:30 AM   (15 min.)   [facility] DRAW   [facility] Lab Draw     [facility] RETURN    8:45 AM   (30 min.)   Nenita Bartholomew MD   [facility] 22     23      [facility] MASON[facility] LAB DRAW    8:30 AM   (15 min.)    MASONIC LAB DRAW   [facility] Lab Draw     [facility] ONC INFUSION 120    9:00 AM   (120 min.)    ONCOLOGY INFUSION   [facility] 24     25       26     27     28     29     30     31 April 2017 Sunday Monday Tuesday Wednesday Thursday Friday Saturday                                 1       2     3     4     5     6     7     8       9     10     11     12     13     [facility] MAS[facility] LAB DRAW   10:00 AM   (15 min.)    MASONIC LAB DRAW   [facility] Lab Draw     [facility] ONC INFUSION 120   10:30 AM   (120 min.)    ONCOLOGY INFUSION   [facility] 14     15       16     17     18     19     20     21     22       23     24     25     26     27     28     29       30                                                Lab Results:  Recent Results (from the past 12 hour(s))   CBC with platelets differential    Collection Time: 03/02/17  9:21 AM   Result Value Ref Range    WBC 7.3 4.0 - 11.0 10e9/L    RBC Count 3.39 (L) 4.4 - 5.9 10e12/L    Hemoglobin 8.5 (L) 13.3 - 17.7 g/dL    Hematocrit 27.5 (L) 40.0 - 53.0 %    MCV 81 78 - 100 fl    MCH 25.1 (L) 26.5 - 33.0 pg    MCHC 30.9 (L) 31.5 - 36.5 g/dL    RDW 16.1 (H) 10.0 - 15.0 %    Platelet Count 395 150 - 450 10e9/L    Diff Method Automated Method     % Neutrophils 83.4 %    % Lymphocytes 11.0 %    % Monocytes 4.7 %    % Eosinophils 0.0 %    % Basophils 0.1 %    % Immature Granulocytes 0.8 %    Nucleated RBCs 0 0 /100    Absolute Neutrophil 6.1 1.6 - 8.3 10e9/L    Absolute Lymphocytes 0.8 0.8 - 5.3 10e9/L    Absolute Monocytes 0.3 0.0 - 1.3 10e9/L    Absolute Eosinophils 0.0 0.0 - 0.7 10e9/L    Absolute Basophils 0.0 0.0 - 0.2 10e9/L    Abs Immature Granulocytes 0.1 0 - 0.4 10e9/L    Absolute Nucleated RBC 0.0    Hepatic panel    Collection Time: 03/02/17  9:21 AM   Result Value Ref Range    Bilirubin Direct <0.1 0.0 - 0.2 mg/dL    Bilirubin Total 0.3 0.2 - 1.3 mg/dL     Albumin 3.0 (L) 3.4 - 5.0 g/dL    Protein Total 8.1 6.8 - 8.8 g/dL    Alkaline Phosphatase 69 40 - 150 U/L    ALT 13 0 - 70 U/L    AST 20 0 - 45 U/L   Creatinine    Collection Time: 03/02/17  9:21 AM   Result Value Ref Range    Creatinine 0.63 (L) 0.66 - 1.25 mg/dL    GFR Estimate >90  Non  GFR Calc   >60 mL/min/1.7m2    GFR Estimate If Black >90   GFR Calc   >60 mL/min/1.7m2   Calcium    Collection Time: 03/02/17  9:21 AM   Result Value Ref Range    Calcium 9.0 8.5 - 10.1 mg/dL           Follow-ups after your visit        Your next 10 appointments already scheduled     Mar 06, 2017 11:00 AM CST   MR BRAIN W CONTRAST with UUMR1   [facility] [place] ([facility])    [address] [facility] [address] [phone]           Take your medicines as usual, unless your doctor tells you not to. Bring a list of your current medicines to your exam (including vitamins, minerals and over-the-counter drugs).  You will be given intravenous contrast for this exam. To prepare:   The day before your exam, drink extra fluids at least six 8-ounce glasses (unless your doctor tells you to restrict your fluids).   Have a blood test (creatinine test) within 30 days of your exam. Go to your clinic or Diagnostic Imaging Department for this test.  The MRI machine uses a strong magnet. Please wear clothes without metal (snaps, zippers). A sweatsuit works well, or we may give you a hospital gown.  Please remove any body piercings and hair extensions before you arrive. You will also remove watches, jewelry, hairpins, wallets, dentures, partial dental plates and hearing aids. You may wear contact lenses, and you may be able to wear your rings. We have a safe place to keep your personal items, but it is safer to leave them at home.   **IMPORTANT** THE INSTRUCTIONS BELOW ARE ONLY FOR THOSE PATIENTS WHO HAVE BEEN TOLD THEY WILL RECEIVE SEDATION OR  GENERAL ANESTHESIA DURING THEIR MRI PROCEDURE:  IF YOU WILL RECEIVE SEDATION (take medicine to help you relax during your exam):   You must get the medicine from your doctor before you arrive. Bring the medicine to the exam. Do not take it at home.   Arrive one hour early. Bring someone who can take you home after the test. Your medicine will make you sleepy. After the exam, you may not drive, take a bus or take a taxi by yourself.   No eating 8 hours before your exam. You may have clear liquids up until 4 hours before your exam. (Clear liquids include water, clear tea, black coffee and fruit juice without pulp.)  IF YOU WILL RECEIVE ANESTHESIA (be asleep for your exam):   Arrive 1 1/2 hours early. Bring someone who can take you home after the test. You may not drive, take a bus or take a taxi by yourself.   No eating 8 hours before your exam. You may have clear liquids up until 4 hours before your exam. (Clear liquids include water, clear tea, black coffee and fruit juice without pulp.)  Please call the Imaging Department at your exam site with any questions.            Mar 20, 2017  9:40 AM CDT   (Arrive by 9:25 AM)   CT CHEST/ABDOMEN/PELVIS W CONTRAST with UCCT2   [facility] [place] [facility] ([facility])    [address] [address] [phone]           Please bring any scans or X-rays taken at other hospitals, if similar tests were done. Also bring a list of your medicines, including vitamins, minerals and over-the-counter drugs. It is safest to leave personal items at home.  Be sure to tell your doctor:   If you have any allergies.   If there s any chance you are pregnant.   If you are breastfeeding.   If you have any special needs.  You may have contrast for this exam. To prepare:   Do not eat or drink for 2 hours before your exam. If you need to take medicine, you may take it with small sips of water. (We may ask you to take liquid medicine as  well.)   The day before your exam, drink extra fluids at least six 8-ounce glasses (unless your doctor tells you to restrict your fluids).  Patients over 70 or patients with diabetes or kidney problems:   If you haven t had a blood test (creatinine test) within the last 30 days, go to your clinic or Diagnostic Imaging Department for this test.  If you have diabetes:   If your kidney function is normal, continue taking your metformin (Avandamet, Glucophage, Glucovance, Metaglip) on the day of your exam.   If your kidney function is abnormal, wait 48 hours before restarting this medicine.  You will have oral contrast for this exam:   You will drink the contrast at home. Get this from your clinic or Diagnostic Imaging Department. Please follow the directions given.  Please wear loose clothing, such as a sweat suit or jogging clothes. Avoid snaps, zippers and other metal. We may ask you to undress and put on a hospital gown.  If you have any questions, please call the Imaging Department where you will have your exam.            Mar 21, 2017  8:30 AM CDT   [facility] Lab Draw with  MASONIC LAB DRAW   [facility] Mason[facility] Lab Draw ([facility])    [address] [place] [id number] [phone]            Mar 21, 2017  9:00 AM CDT   (Arrive by 8:45 AM)   Return Visit with Nenita Bartholomew MD   [facility] ([facility])    [address] [place] [id number]   [phone]            Mar 23, 2017  8:30 AM CDT   [facility] Lab Draw with  MASONIC LAB DRAW   [facility] ([facility])    [address] [place] [id number]   [phone]            Mar 23, 2017  9:00 AM CDT   Infusion 120 with UC ONCOLOGY INFUSION, UC 25 ATC   [facility] ([facility])    [address] [place] [id number]    [phone]            Apr 13, 2017 10:00 AM CDT   Masonic Lab Draw with UC MASONIC LAB DRAW   [facility] Lab Draw ([facility])    [address] [facility]  [address] [facility] [address] [phone]            Apr 13, 2017 10:30 AM CDT   Infusion 120 with UC ONCOLOGY INFUSION, UC 30 ATC   [facility] ([facility])    [address] [address] [phone]              Future tests that were ordered for you today     Open Future Orders        Priority Expected Expires Ordered    MRI Brain w contrast Routine  5/31/2017 3/2/2017            Who to contact     If you have questions or need follow up information about today's clinic visit or your schedule please contact [facility] directly at [phone].  Normal or non-critical lab and imaging results will be communicated to you by MyChart, letter or phone within 4 business days after the clinic has received the results. If you do not hear from us within 7 days, please contact the clinic through [facility] or phone. If you have a critical or abnormal lab result, we will notify you by phone as soon as possible.  Submit refill requests through [employer] or call your pharmacy and they will forward the refill request to us. Please allow 3 business days for your refill to be completed.          Additional Information About Your Visit        [facility] Information     [employer] gives you secure access to your electronic health record. If you see a primary care provider, you can also send messages to your care team and make appointments. If you have questions, please call your primary care clinic.  If you do not have a primary care provider, please call [phone] and they will assist you.        Care EveryWhere ID     This is your Care EveryWhere ID. This could be used by other organizations to access your [place] medical records  [id number]          Blood Pressure from Last 3 Encounters:   03/02/17 137/89   02/14/17 117/78   02/09/17 141/84    Weight from Last 3 Encounters:   03/02/17 106.5 kg (234 lb 11.2 oz)   02/14/17 107.3 kg (236 lb 9.6 oz)   02/09/17 109.6 kg (241 lb 9.6 oz)              We Performed the Following     Calcium     CBC with platelets differential     Creatinine     Hepatic panel     Treatment Conditions          Today's Medication Changes          These changes are accurate as of: 3/2/17 11:27 AM.  If you have any questions, ask your nurse or doctor.               Start taking these medicines.        Dose/Directions    fentaNYL 50 mcg/hr 72 hr patch   Commonly known as:  DURAGESIC   Used for:  Left-sided low back pain without sciatica, unspecified chronicity, Brain metastasis (H), Non-small cell lung cancer (NSCLC) (H)   Replaces:  fentaNYL 25 mcg/hr 72 hr patch   Started by:  Yulia Lepe APRN CNP        Dose:  1 patch   Place 1 patch onto the skin every 72 hours   Quantity:  10 patch   Refills:  0         These medicines have changed or have updated prescriptions.        Dose/Directions    * dexamethasone 4 MG tablet   Commonly known as:  DECADRON   This may have changed:  Another medication with the same name was added. Make sure you understand how and when to take each.   Used for:  Non-small cell lung cancer (NSCLC) (H)   Changed by:  Nenita Bartholomew MD        Dose:  8 mg   Take 2 tablets (8 mg) by mouth 2 times daily (with meals) for 6 doses Begin the evening prior to Docetaxel, and on the morning of Docetaxel, then continue for 4 additional doses.   Quantity:  12 tablet   Refills:  7       * dexamethasone 4 MG tablet   Commonly known as:  DECADRON   This may have changed:  You were already taking a medication with the same name, and this prescription was added. Make sure you understand how and when to take each.   Used for:  Brain metastasis (H), Non-small cell lung cancer (NSCLC) (H)   Changed by:  Yulia Lepe APRN CNP         Take 8 mg the evening before, AM of, and bid x 2 days after docetaxel, then decrease to 4 mg daily x 3, then stop. Repeat with each cycle of Taxotere   Quantity:  30 tablet   Refills:  0       * oxyCODONE 10 MG 12 hr tablet   Commonly known as:  OxyCONTIN   This may have changed:  Another medication with the same name was changed. Make sure you understand how and when to take each.   Used for:  Non-small cell lung cancer (NSCLC) (H)   Changed by:  Nenita Bartholomew MD        Dose:  10 mg   Take 1 tablet (10 mg) by mouth At Bedtime maximum 1tablet(s) per day   Quantity:  30 tablet   Refills:  0       * oxyCODONE 5 MG capsule   Commonly known as:  OXY-IR   This may have changed:  how much to take   Used for:  Left-sided low back pain without sciatica, unspecified chronicity, Non-small cell lung cancer (NSCLC) (H)   Changed by:  Yulia Lepe APRN CNP        Dose:  5-15 mg   Take 1-3 capsules (5-15 mg) by mouth every 4 hours as needed for moderate to severe pain   Quantity:  200 capsule   Refills:  0       * Notice:  This list has 4 medication(s) that are the same as other medications prescribed for you. Read the directions carefully, and ask your doctor or other care provider to review them with you.      Stop taking these medicines if you haven't already. Please contact your care team if you have questions.     fentaNYL 25 mcg/hr 72 hr patch   Commonly known as:  DURAGESIC   Replaced by:  fentaNYL 50 mcg/hr 72 hr patch   Stopped by:  Yulia Lepe APRN CNP                Where to get your medicines      These medications were sent to [place], [place] - [address] [facility] [address] [address]    Hours:  TRANSPLANT PHONE NUMBER [phone] Phone:  [phone]     dexamethasone 4 MG tablet         Some of these will need a paper prescription and others can be bought over the counter.  Ask your nurse if you have questions.     Bring a  paper prescription for each of these medications     fentaNYL 50 mcg/hr 72 hr patch    oxyCODONE 5 MG capsule                Primary Care Provider    None Specified       No primary provider on file.        Thank you!     Thank you for choosing [facility]  for your care. Our goal is always to provide you with excellent care. Hearing back from our patients is one way we can continue to improve our services. Please take a few minutes to complete the written survey that you may receive in the mail after your visit with us. Thank you!             Your Updated Medication List - Protect others around you: Learn how to safely use, store and throw away your medicines at www.disposemymeds.org.          This list is accurate as of: 3/2/17 11:27 AM.  Always use your most recent med list.                   Brand Name Dispense Instructions for use    albuterol 108 (90 BASE) MCG/ACT Inhaler   Generic drug:  albuterol      Inhale 1-2 puffs into the lungs daily as needed       cyclobenzaprine 10 MG tablet    FLEXERIL     Take 10 mg by mouth       * dexamethasone 4 MG tablet    DECADRON    12 tablet    Take 2 tablets (8 mg) by mouth 2 times daily (with meals) for 6 doses Begin the evening prior to Docetaxel, and on the morning of Docetaxel, then continue for 4 additional doses.       * dexamethasone 4 MG tablet    DECADRON    30 tablet    Take 8 mg the evening before, AM of, and bid x 2 days after docetaxel, then decrease to 4 mg daily x 3, then stop. Repeat with each cycle of Taxotere       famotidine 10 MG tablet    PEPCID    20 tablet    Take 2 tablets (20 mg) by mouth 2 times daily       fentaNYL 50 mcg/hr 72 hr patch    DURAGESIC    10 patch    Place 1 patch onto the skin every 72 hours       FOLIC ACID PO      Take 800 mcg by mouth daily       IBUPROFEN PO      Take 1 tablet by mouth every 8 hours as needed       levETIRAcetam 100 MG/ML solution    KEPPRA     Take 10 mg/kg by mouth 2 times daily        lidocaine 5 % Patch    LIDODERM    30 patch    Apply up to 3 patches to painful area at once for up to 12 h within a 24 h period.  Remove after 12 hours.       LORazepam 0.5 MG tablet    ATIVAN    30 tablet    Take 1 tablet (0.5 mg) by mouth every 4 hours as needed (Anxiety, Nausea/Vomiting or Sleep)       magnesium hydroxide 2400 MG/10ML Susp    MOM     Take 5 mLs by mouth daily as needed for constipation Reported on 3/2/2017       ondansetron 8 MG tablet    ZOFRAN    60 tablet    Take 1 tablet (8 mg) by mouth every 8 hours as needed       OSCAL 500/200 D-3 PO          * oxyCODONE 10 MG 12 hr tablet    OxyCONTIN    30 tablet    Take 1 tablet (10 mg) by mouth At Bedtime maximum 1tablet(s) per day       * oxyCODONE 5 MG capsule    OXY-IR    200 capsule    Take 1-3 capsules (5-15 mg) by mouth every 4 hours as needed for moderate to severe pain       prochlorperazine 10 MG tablet    COMPAZINE    30 tablet    Take 1 tablet (10 mg) by mouth every 6 hours as needed (Nausea/Vomiting)       SENNA CO          TYLENOL PO      Take 500 mg by mouth daily as needed       * Notice:  This list has 4 medication(s) that are the same as other medications prescribed for you. Read the directions carefully, and ask your doctor or other care provider to review them with you.

## 2017-03-02 NOTE — PATIENT INSTRUCTIONS
Medication changes:  Dexamethasone 8 mg the PM before docetaxel and the AM prior to docetaxel.  Then twice daily x 2 days after docetaxel, then decrease to 4 mg daily x 3 days, then stop.  Call if pain is flaring and we may decide to continue the steroid.    Fentanyl patch:  Stop the 25 mcg patch  Start 50 mcg patch, change every 72 hours  Change oxycodone to 10-15 mg every 4 hours as needed for pain    Schedule Brain MRI

## 2017-03-02 NOTE — NURSING NOTE
Chief Complaint   Patient presents with     Blood Draw     vs/labs by [facility]   See doc flowsheets for details.  LADARIUS Holguin, CMA

## 2017-03-02 NOTE — NURSING NOTE
"Naren Kimble is a 42 year old male who presents for:  Chief Complaint   Patient presents with     Blood Draw     vs[facility]labs by [facility]     Oncology Clinic Visit     return patient for pre-infusion follow up related to Brain metastasis (H)        Initial Vitals:  /89  Pulse 109  Temp 98  F (36.7  C) (Oral)  Ht 1.88 m (6' 2.02\")  Wt 106.5 kg (234 lb 11.2 oz)  SpO2 100%  BMI 30.12 kg/m2 Estimated body mass index is 30.12 kg/(m^2) as calculated from the following:    Height as of this encounter: 1.88 m (6' 2.02\").    Weight as of this encounter: 106.5 kg (234 lb 11.2 oz).. Body surface area is 2.36 meters squared. BP completed using cuff size: NA (Not Taken)  Data Unavailable No LMP for male patient. Allergies and medications reviewed.     Medications: MEDICATION REFILLS NEEDED TODAY.  Pharmacy name entered into Settle:    [facility] [place], [place] - [address] [place] [address] [phone]  [facility] [address] [place] [address] AT [facility] OF [address]    Comments: patient denied pain/discomfort.     5 minutes for nursing intake (face to face time)   Murtaza De Los Santos CMA        "

## 2017-03-07 NOTE — PROGRESS NOTES
Called Valerio about the brain MRI and reviewed images with Dr. Bartholomew. The scalp numbness is not likely related to brain mets, but he has new (2) small lesions. We discussed management. He is asymptomatic now and leaving for [place] for a family trip and will return on 3/17/17.     Will refer for gammaknife now and hope to have an appointment set up when he returns on 3/20. Will increase dexamethasone to 4 mg bid while on vacation to prevent complications from edema.    Valerio is having some nausea now (similar to post cycle 1 taxotere). Using ondansetron and prochlorperazine prn. Hoping the increased dex will also help with nausea. Asked him to also schedule ondansetron q 8 hour and use prochlorperazine in between doses prn.    We discussed that he may need to seek medical care while in [place] if he has any new neurologic symptoms including vision changes, difficulty with speech or coordination or focal weakness. He has his records in case the need arises. TW

## 2017-03-13 NOTE — TELEPHONE ENCOUNTER
"Date: 3/13/2017   Age: 42 year old  Ethnicity:     Sex: male  : 1975   Lives In: [place], [place]      Diagnosis: Metastatic NSCLC    Prior radiation therapy:   Site Treated: tumor resection bed left frontal  Facility: [facility] by Dr. Hartmann  Dates: 16 thru 16  Dose: 3000 cGy in 5 fractions    Site Treated: Thoracic Spine / Pelvis / Left Hip  Facility: [facility] by Dr. Kovacs  Dates: 17 thru 17  Dose: 2000 cGy each site over 5 fractions     Prior chemotherapy: See Below        Pain at time of consult, management plan if yes:  Does pt have a living will:  Fall Risk, if yes what precautions taking:  Is Pt Pregnant: N/A  Does Pt have any implanted cardiac devices:    Doctors to \"cc\":  Dr. Nenita Bartholomew,    Pt was educated on Gamma Knife Procedure         Review Since Diagnosis:    16; to primary doctor with couple week history of headaches and vomiting     16; Brain MRI, 3.5 cm mass left frontal                                    1 cm lesion left cerebellum felt to be a cavernoma    16; crani left frontal lesion at [facility] by Dr. Zi Porter, showed metastatic adenocarcinoma, most consistent with lung primary, negative EGFR and ALK gene mutations    16; CT Chest/Abd/Pelvis, moderate irregular masses lower lung fields, no definitive mass     16; Bronchoscopy with fine needle aspirations all negative or non diagnostic, but felt tiny lesion left lung may be primary    16; pt saw Dr. Norm Egan, neurosurgical consult, says no primary lung lesion seen at present, discussed stereotactic radiosurgery to surgical cavity    16 thru 16; SRT to tumor resection bed left frontal by Dr. Hartmann at [facility]    16; Started Cisplatin and Alimta, every 3 weeks plan 6 cycles     Progressed after three cycles     10/18/16; MRI Lumbar Spine, multiple areas concerning for mets, started Zometa    16; saw Dr. Nenita Bartholomew who recommended clinical trial with ALT-803 plus " Nivolumab     Pseudoprogression after 1st cycle     1/16/17 thru 1/20/17; radiation to T spine, sacrum and left hip by Dr. Kovacs    2/6/17; PET, progression of size primary left lung base mass, increase size number of lesions mediastinum/rosalee and adrenal gland, many bone mets     2/7/17; started Taxotere     3/2/17; pt saw Yulia Roberth, due for cycle 2 of taxotere, got zometa, increased pain meds     3/6/17; Brain MRI, new 0.5 x 0.5 x 0.7 cm lesion right occipital (in retrospect on previous MRI 0.4 cm, but felt to be vessel)                                 New 0.5 cm lesion parasagittal right occipital                                  Postsurgical changes left frontal crani unchanged                                 1.1 x 1 cm stable lesion, what is felt to be a cavernoma     Chief Complaint: at consult

## 2017-03-20 NOTE — TELEPHONE ENCOUNTER
Refill Request    Date of most recent appointment:  3/2/2017 with Yulia Lepe NP  Next upcoming appointment:   3/21/2017 with Dr. Bartholomew    Medication requested:  Oxycodone 5 mg tablet  Quantity:  200  Last fill date:  3/2/2017  Person requesting refill:  Pt's spouse  Notes:  N/A    Prescribing provider(s):  Yulia Lepe NP  Refill approved/denied:  Approved    Disposition of prescription:  Tubed to [facility] pharmacy per spouse's request

## 2017-03-20 NOTE — PROGRESS NOTES
"  HPI    Date: 3/13/2017   Age: 42 year old  Ethnicity:     Sex: male  : 1975   Lives In: [place], [place]      Diagnosis: Metastatic NSCLC    Prior radiation therapy:   Site Treated: tumor resection bed left frontal  Facility: [facility] by Dr. Hartmann  Dates: 16 thru 16  Dose: 3000 cGy in 5 fractions    Site Treated: Thoracic Spine / Pelvis / Left Hip  Facility: [facility] by Dr. Kovacs  Dates: 17 thru 17  Dose: 2000 cGy each site over 5 fractions     Prior chemotherapy: See Below        Pain at time of consult, management plan if yes: back pain 5/10  Does pt have a living will: pt states he has  Fall Risk, if yes what precautions taking: pt has not been falling  Is Pt Pregnant: N/A  Does Pt have any implanted cardiac devices: pt has no implanted cardiac devices  Pt states he feels safe in his home    Doctors to \"cc\":  Dr. Nenita Bartholomew,    Pt was educated on Gamma Knife Procedure; yes        Review Since Diagnosis:    16; to primary doctor with couple week history of headaches and vomiting     16; Brain MRI, 3.5 cm mass left frontal                                    1 cm lesion left cerebellum felt to be a cavernoma    16; crani left frontal lesion at [facility] by Dr. Zi Porter, showed metastatic adenocarcinoma, most consistent with lung primary, negative EGFR and ALK gene mutations    16; CT Chest/Abd/Pelvis, moderate irregular masses lower lung fields, no definitive mass     16; Bronchoscopy with fine needle aspirations all negative or non diagnostic, but felt tiny lesion left lung may be primary    16; pt saw Dr. Norm Egan, neurosurgical consult, says no primary lung lesion seen at present, discussed stereotactic radiosurgery to surgical cavity    16 thru 16; SRT to tumor resection bed left frontal by Dr. Hartmann at [facility]    16; Started Cisplatin and Alimta, every 3 weeks plan 6 cycles     Progressed after three cycles     10/18/16; MRI Lumbar " Spine, multiple areas concerning for mets, started Zometa    11/2/16; saw Dr. Nenita Bartholomew who recommended clinical trial with ALT-803 plus Nivolumab     Pseudoprogression after 1st cycle     1/16/17 thru 1/20/17; radiation to T spine, sacrum and left hip by Dr. Kovacs    2/6/17; PET, progression of size primary left lung base mass, increase size number of lesions mediastinum/rosalee and adrenal gland, many bone mets     2/7/17; started Taxotere     3/2/17; pt saw Yulia Lepe, due for cycle 2 of taxotere, got zometa, increased pain meds, pt told Yulia that he was having pain along right base of neck and up into head area, this triggered Yulia to request a Brain MRI        3/6/17; Brain MRI, new 0.5 x 0.5 x 0.7 cm lesion right occipital (in retrospect on previous MRI 0.4 cm, but felt to be vessel)                                 New 0.5 cm lesion parasagittal right occipital                                  Postsurgical changes left frontal crani unchanged                                 1.1 x 1 cm stable lesion, what is felt to be a cavernoma     3/23/17; pt getting taxotere, gets every 3 weeks    Chief Complaint: pt states the pain right neck to head is gone since starting the steroids, no other symptoms              Review of Systems   Constitutional: Positive for malaise/fatigue and weight loss. Negative for fever.        Pt notes 60 lb wt loss since diagnosis  Right leg weaker   Eyes: Negative for blurred vision, double vision and photophobia.   Respiratory: Positive for cough. Negative for shortness of breath.    Cardiovascular: Negative for chest pain and leg swelling.   Gastrointestinal: Positive for constipation, nausea and vomiting. Negative for blood in stool.   Genitourinary: Negative.    Musculoskeletal:        Back pain still mainly at this point   Neurological: Positive for weakness. Negative for dizziness, seizures and headaches.   Psychiatric/Behavioral: Negative.

## 2017-03-20 NOTE — LETTER
3/20/2017       RE: Naren Kimble  [address]  [place] [place] [facility] [place] [facility] [address]     Dear Colleague,    Thank you for referring your patient, Naren Kimble, to the [facility] [place], RADIATION ONCOLOGY. Please see a copy of my visit note below.    CONSULTATION NOTE  DATE OF SERVICE: 03/20/2017    HISTORY OF PRESENT ILLNESS:  Mr. Kimble is a 42 year old gentleman with a history of non-small cell lung cancer who is diagnosed to have brain metastasis and seeing for possible Gamma Knife radiation.  He initially presented in July 2016 with a brain mass, which was excised and consistent with metastatic adenocarcinoma of the lung. CT Chest on 7/28/2016 was negative for pulmonary mass, and EGFR and ALK studies were negative for targetable mutations. He underwent postoperative stereotactic fractionated radiotherapy to the tumor bed by Dr. Hartmann, from 9/12/16-9/16/16; the tumor bed received 3000 cGy in 5 fractions.   His brain lesion was consistent with metastatic adenocarcinoma of the lung. Negative EGFR and ALK rearrangement studies. CT scan in 07/2016 did not have any definitive mass in the lung. PET/CT 08/2015 showed 1 cm mass in the left lower lobe. Bronchoscopy was negative with the [facility] system.   He proceeded with cisplatin and Alimta for 6 cycles  and started chemotherapy 09/19/2016. Imaging after 2 cycles of chemotherapy were consistent with metastatic disease in the bone. He was started on Zometa and has had 3 cycles of adjuvant cisplatin and Alimta.      [id number] with Nivolumab first He proceeded with cisplatin and Alimta for 6 cycles after stereotactic radiation to resection bed and started chemotherapy 09/19/2016. Imaging after 2 cycles of chemotherapy were consistent with metastatic disease in the bone. He was started on Zometa and has had 3 cycles of adjuvant cisplatin and Alimta.treatment 11/23/16.   He showed progression on scan 2/6/17 and started 2/7/2017 Txotere, completed 2 cycles.  On 3/21/2017  Vinorelbine/gemcitabine  He also received palliative radiation to Thoracic Spine / Pelvis / [facility]. Kovasc from 1/16/17 thru 1/20/17 with   Total dose  2000 cGy each site over 5 fractions       The patient was seen as a follow up at medical oncology clinic as he  returned from a vacation. He has lower back pain but no pain in the upper back area.  He ha severe right neck pain which improved with Decadron per Dr. Bartholomew.  On review of his imaging study, he has destructive spinal lesion which was not radiated before.     CHEMOTHERAPY HISTORY: As per HPI     PAST RADIATION THERAPY HISTORY: As per HPI, fractionated radiosurgery to the intracranial tumor bed, 3000 cGy in 5 fractions, by Dr. Hartmann at [facility].  Thoracic Spine / Pelvis / Left Hip at [facility]  by Dr. Kovacs from 1/16/17 thru 1/20/17 with   Total dose  2000 cGy each site over 5 fractions       PAST MEDICAL HISTORY:  1. Lung cancer as per HPI  2. Obstructive sleep apnea     PAST SURGICAL HISTORY:  1. Left frontal craniotomy as per HPI  2. Bronchoscopy with biopsies 8/8/16 as per HPI.     ALLERGIES:  No known drug allergies.     MEDICATIONS: Reviwed     FAMILY HISTORY:  He has no pertinent oncologic family history. No known oncologic syndromes or history of lung cancer.     SOCIAL HISTORY:  Valerio is , he is a lifelong never smoker, and drinks alcohol rarely.     REVIEW OF SYSTEMS: A 10 point review of systems was obtained. Pertinent findings are noted in the HPI and are otherwise unremarkable.      PHYSICAL EXAM:  VITALS: /95 mmHg  Pulse 115, Weight 230 lbs  GEN: Appears well, alert, oriented, and in NAD  HEENT: EOMI, normal conjunctiva, MMM, no thrush  NECK: Supple, full ROM, no cervical or clavicular lymphadenopathy. Tenderness of neck  CV: Normal rate, regular rhythm, no murmurs/rubs/gallops, warm and well-perfused  RESP: CTAB, no wheezes/rales/rhonchi, breathing comfortably on room air  ABDOMEN: Soft, NT, ND, bowel sounds  present  MUSCULOSKELETAL: No instability or reproducible pain on vertebral palpation. Bill localizes his pain to the lower lumbar spine and upper sacrum, primarily at midline but also within 3cm to the left of midline.  NEURO: No focal deficits, CN 3-12 grossly intact, normal and symmetric motor and sensory exam in bilateral upper and lower extremities, normal gait  PSYCH: Appropriate mood and affect     ECOG PERFORMANCE STATUS: 1     PACEMAKER: None     ASSESSMENT AND RECOMMENDATION:  Mr. Kimble is a 42year old gentleman with metastatic non-small cell lung cancer, with diffusely metastatic disease most significantly in the axial and appendicular skeleton. He has now new brain metastasis in addition to his progression of disease and Gamma Knife radiosurgery is considered for the brain metastasis. He is scheduled to be seen by Dr. Frederick this afternoon.     The rationale, logistics, risks, benefits, and potential side effects of the proposed treatment were reviewed in detail. The patient had many questions during our conversation today, which were answered to the best of our ability.      Thank you letting us to participate on this patient care.    Elian Fraser MD      HPI    Date: 3/13/2017   Age: 42 year old  Ethnicity:     Sex: male  : 1975   Lives In: [place], [place]      Diagnosis: Metastatic NSCLC    Prior radiation therapy:   Site Treated: tumor resection bed left frontal  Facility: [facility] by Dr. Hartmann  Dates: 16 thru 16  Dose: 3000 cGy in 5 fractions    Site Treated: Thoracic Spine / Pelvis / Left Hip  Facility: [facility] by Dr. Kovacs  Dates: 17 thru 17  Dose: 2000 cGy each site over 5 fractions     Prior chemotherapy: See Below        Pain at time of consult, management plan if yes: back pain 5/10  Does pt have a living will: pt states he has  Fall Risk, if yes what precautions taking: pt has not been falling  Is Pt Pregnant: N/A  Does Pt have any implanted cardiac devices: pt  "has no implanted cardiac devices  Pt states he feels safe in his home    Doctors to \"cc\":  Dr. Nenita Bartholomew,    Pt was educated on Gamma Knife Procedure; yes        Review Since Diagnosis:    7/20/16; to primary doctor with couple week history of headaches and vomiting     7/26/16; Brain MRI, 3.5 cm mass left frontal                                    1 cm lesion left cerebellum felt to be a cavernoma    7/27/16; crani left frontal lesion at [facility] by Dr. Zi Porter, showed metastatic adenocarcinoma, most consistent with lung primary, negative EGFR and ALK gene mutations    7/28/16; CT Chest/Abd/Pelvis, moderate irregular masses lower lung fields, no definitive mass     8/8/16; Bronchoscopy with fine needle aspirations all negative or non diagnostic, but felt tiny lesion left lung may be primary    8/30/16; pt saw Dr. Norm Egan, neurosurgical consult, says no primary lung lesion seen at present, discussed stereotactic radiosurgery to surgical cavity    9/12/16 thru 9/16/16; SRT to tumor resection bed left frontal by Dr. Hartmann at [facility]    9/19/16; Started Cisplatin and Alimta, every 3 weeks plan 6 cycles     Progressed after three cycles     10/18/16; MRI Lumbar Spine, multiple areas concerning for mets, started Zometa    11/2/16; saw Dr. Nenita Bartholomew who recommended clinical trial with ALT-803 plus Nivolumab     Pseudoprogression after 1st cycle     1/16/17 thru 1/20/17; radiation to T spine, sacrum and left hip by Dr. Kovacs    2/6/17; PET, progression of size primary left lung base mass, increase size number of lesions mediastinum/rosalee and adrenal gland, many bone mets     2/7/17; started Taxotere     3/2/17; pt saw Yulia Lepe, due for cycle 2 of taxotere, got zometa, increased pain meds, pt told Yulia that he was having pain along right base of neck and up into head area, this triggered Yulia to request a Brain MRI        3/6/17; Brain MRI, new 0.5 x 0.5 x 0.7 cm lesion right occipital (in retrospect on previous " MRI 0.4 cm, but felt to be vessel)                                 New 0.5 cm lesion parasagittal right occipital                                  Postsurgical changes left frontal crani unchanged                                 1.1 x 1 cm stable lesion, what is felt to be a cavernoma     3/23/17; pt getting taxotere, gets every 3 weeks    Chief Complaint: pt states the pain right neck to head is gone since starting the steroids, no other symptoms              Review of Systems   Constitutional: Positive for malaise/fatigue and weight loss. Negative for fever.        Pt notes 60 lb wt loss since diagnosis  Right leg weaker   Eyes: Negative for blurred vision, double vision and photophobia.   Respiratory: Positive for cough. Negative for shortness of breath.    Cardiovascular: Negative for chest pain and leg swelling.   Gastrointestinal: Positive for constipation, nausea and vomiting. Negative for blood in stool.   Genitourinary: Negative.    Musculoskeletal:        Back pain still mainly at this point   Neurological: Positive for weakness. Negative for dizziness, seizures and headaches.   Psychiatric/Behavioral: Negative.        Again, thank you for allowing me to participate in the care of your patient.      Sincerely,    Elian Fraser MD

## 2017-03-20 NOTE — LETTER
3/20/2017     RE: Naren Kimble  [address]  [facility] [address]     Dear Colleague,    Thank you for referring your patient, Naren Kimble, to the [facility]. Please see a copy of my visit note below.    It was a pleasure to see Naren Kimble today in Neurosurgery Clinic in consultation for Gamma Knife Radiosurgery. He is a 42 year old year old patient     with a history of lung cancer diagnosed in 2016. Recently, he was noted to have brain metastasis with the following symptoms: none. Had a L frontal lesion at initial diagnoses, resected and cavity treated with SRT. He is taking dexamethasone. He has not had whole brain radiation. The neurosurgical services health history form was reviewed and scanned into the medical record today.    Review Since Diagnosis:    7/20/16; to primary doctor with couple week history of headaches and vomiting     7/26/16; Brain MRI, 3.5 cm mass left frontal    1 cm lesion left cerebellum felt to be a cavernoma    7/27/16; crani left frontal lesion at [facility] by Dr. Zi Porter, showed metastatic adenocarcinoma, most consistent with lung primary, negative EGFR and ALK gene mutations    7/28/16; CT Chest/Abd/Pelvis, moderate irregular masses lower lung fields, no definitive mass     8/8/16; Bronchoscopy with fine needle aspirations all negative or non diagnostic, but felt tiny lesion left lung may be primary    8/30/16; pt saw Dr. Norm Egan, neurosurgical consult, says no primary lung lesion seen at present, discussed stereotactic radiosurgery to surgical cavity    9/12/16 thru 9/16/16; SRT to tumor resection bed left frontal by Dr. Hartmann at [facility]    9/19/16; Started Cisplatin and Alimta, every 3 weeks plan 6 cycles     Progressed after three cycles     10/18/16; MRI Lumbar Spine, multiple areas concerning for mets, started Zometa    11/2/16; saw Dr. Nenita Bartholomew who recommended clinical trial with ALT-803 plus Nivolumab     Pseudoprogression after 1st  cycle     1/16/17 thru 1/20/17; radiation to T spine, sacrum and left hip by Dr. Kovacs    2/6/17; PET, progression of size primary left lung base mass, increase size number of lesions mediastinum/rosalee and adrenal gland, many bone mets     2/7/17; started Taxotere     3/2/17; pt saw Yulia Lepe, due for cycle 2 of taxotere, got zometa, increased pain meds     3/6/17; Brain MRI, new 0.5 x 0.5 x 0.7 cm lesion right occipital (in retrospect on previous MRI 0.4 cm, but felt to be vessel)    New 0.5 cm lesion parasagittal right occipital     Postsurgical changes left frontal crani unchanged    1.1 x 1 cm stable lesion, what is felt to be a cavernoma     Medications:  Current Outpatient Prescriptions   Medication     oxyCODONE (OXY-IR) 5 MG capsule     dexamethasone (DECADRON) 4 MG tablet     cyclobenzaprine (FLEXERIL) 10 MG tablet     prochlorperazine (COMPAZINE) 10 MG tablet     ondansetron (ZOFRAN) 8 MG tablet     lidocaine (LIDODERM) 5 % Patch     fentaNYL (DURAGESIC) 50 mcg/hr 72 hr patch     dexamethasone (DECADRON) 4 MG tablet     albuterol (ALBUTEROL) 108 (90 BASE) MCG/ACT Inhaler     oxyCODONE (OXYCONTIN) 10 MG 12 hr tablet     LORazepam (ATIVAN) 0.5 MG tablet     dexamethasone (DECADRON) 4 MG tablet     IBUPROFEN PO     magnesium hydroxide (MOM) 2400 MG/10ML SUSP     SENNA CO     Calcium Carbonate-Vitamin D (OSCAL 500/200 D-3 PO)     Acetaminophen (TYLENOL PO)     famotidine (PEPCID) 10 MG tablet     levETIRAcetam (KEPPRA) 100 MG/ML solution     FOLIC ACID PO     No current facility-administered medications for this visit.        Allergies:     Allergies   Allergen Reactions     Animal Dander Shortness Of Breath     Cats Shortness Of Breath       Past Medical History:  Past Medical History   Diagnosis Date     Lung cancer (H)      adenocarcinoma, metastatic to brain, bone     Sleep apnea      CPAP       Past Surgical History:  Past Surgical History   Procedure Laterality Date     Craniotomy  7/27/2016     left  frontal craniotomy     Biopsy  8/8/2016     bronchoscopy FNA lymph node, hilar  node, paratracheal node       Social History:  Social History     Social History     Marital status:      Spouse name: Jhonatan     Number of children: 2     Years of education: N/A     Occupational History     pharmaceutical           Social History Main Topics     Smoking status: Never Smoker     Smokeless tobacco: Not on file     Alcohol use 0.0 oz/week     0 Standard drinks or equivalent per week      Comment: occasional     Drug use: No     Sexual activity: Not on file     Other Topics Concern     Not on file     Social History Narrative       Family History:  No family history on file.    Review of Systems:  See neurosurgical services health history form.    GENERAL PHYSICAL EXAMINATION:  Handedness:  Right  HENT:   Normocephalic  Well healed L frontal incision    BP (!) 146/98 (BP Location: Right arm, Patient Position: Right side, Cuff Size: Adult Large)  Pulse 129  Temp 96.7  F (35.9  C) (Oral)  Resp 16  Wt 103.6 kg (228 lb 8 oz)  SpO2 98%  BMI 29.34 kg/m2     Appearance: Comfortable and relaxed  LOC and Cognition:  Normal:   Alert and oriented to time, person and place for age, Appropriate and fluent speech for age, Follows commands appropriately for age, Affect pleasant, behavior cooperative, Accurate historian and No expressive or receptive aphasia  Karnofsky Score:    Oswestry Score:    Neck Disability Index Score:      Cranial Nerves:  Normal:   Partial II, III/IV/VI, V, VII, VIII, IX/X, XI, XII Normal:   Pupils 3 mm react briskly and appear symmetric, vision grossly intact, peripheral fields intact, extraocular movements conjugate and full, no ptosis, facial sensation and movements intact and symmetric, swallow and voice quality unremarkable, hearing grossly intact, shoulders shrugs strong and symmetric, and tongue midline.    Motor:  Normal UE/LE:   Strength in upper and lower extremities was 5/5 for  bilateral deltoids, biceps, triceps, wrist flexors, wrist extensors, hand intrinsics, bilateral hip flexors, knee extensors, knee flexors, dorsiflexion and plantar flexion except mild HF weakness bilaterally, pain related.    IMAGING: MRI shows two lesions in R occipital lesion. The imaging was shown to the patient and reviewed in clinic.     ASSESSMENT:     Diagnosis:  Metastatic lung cancer. Brain metastasis    PLAN: I had a discussion with the patient about gamma knife radiosurgical treatment of brain metastasis.  I believe the patient is an appropriate candidate for treatment and discussed other options including but not limited to observation, surgery, WBRT.  I discussed the course of GKRS, including frame placement, MRI imaging and risk of finding new metastatic lesions, treatment planning and delivery of radiation.  Risks of the procedure were discussed including adverse reaction to medications including local anesthetic, risks of frame placement including cranial perforation or movement of the frame during treatment, risks of radiation delivery including injury to brain surrounding the target or inadvertent delivery of radiation to unintended structures, or possibility of being unable to deliver radiation to the intended targets due to the geometry of the Gamma Knife machine and possible need for moving the frame. Questions were solicited and answered.  We will proceed with Gamma Knife radiosurgery in the near future. Please do not hesitate to contact me with questions.    Total time spent on visit was 20 minutes with greater than 15 minutes involved with case coordination and counseling.    Again, thank you for allowing me to participate in the care of your patient.      Sincerely,    John Frederick MD

## 2017-03-20 NOTE — MR AVS SNAPSHOT
After Visit Summary   3/20/2017    Naren Kimble    MRN: [id number]           Patient Information     Date Of Birth          1975        Visit Information        Provider Department      3/20/2017 12:00 PM Elian Fraser MD [facility], [place], Radiation Oncology        Today's Diagnoses     Non-small cell lung cancer (NSCLC) (H)    -  1    Brain metastasis (H)           Follow-ups after your visit        Your next 10 appointments already scheduled     Mar 23, 2017  8:30 AM CDT   M[facility]ic Lab Draw with  MASONIC LAB DRAW   [facility] Lab Draw ([facility])    [address] [place] [address]   [phone]            Mar 23, 2017  9:00 AM CDT   Infusion 60 with  ONCOLOGY INFUSION, UC 25 ATC   [facility] Cancer Clinic ([facility])    [address] [place] [address]   [phone]            Mar 28, 2017  5:15 AM CDT   GAMMA TREATMENT with Elian Fraser MD   [facility], Radiation Oncology ([facility])    [address] [place] [address]   [phone]            Mar 28, 2017  6:30 AM CDT   GAMMA TREATMENT with John Frederick MD   [facility], Radiation Oncology ([facility])    [address] [place] [address]   [phone]            Mar 28, 2017  7:00 AM CDT   MR BRAIN W CONTRAST with UUMR1   [facility], MRI ([facility] [place])    [address] [facility] [address]   [phone]           Take your medicines as usual, unless your doctor tells you not to. Bring a list of your current medicines to your exam (including vitamins, minerals and over-the-counter drugs).  You will be given intravenous contrast for this exam. To prepare:   The day before your exam, drink extra fluids at least six 8-ounce glasses (unless your doctor tells you to restrict your fluids).   Have a blood test  (creatinine test) within 30 days of your exam. Go to your clinic or Diagnostic Imaging Department for this test.  The MRI machine uses a strong magnet. Please wear clothes without metal (snaps, zippers). A sweatsuit works well, or we may give you a hospital gown.  Please remove any body piercings and hair extensions before you arrive. You will also remove watches, jewelry, hairpins, wallets, dentures, partial dental plates and hearing aids. You may wear contact lenses, and you may be able to wear your rings. We have a safe place to keep your personal items, but it is safer to leave them at home.   **IMPORTANT** THE INSTRUCTIONS BELOW ARE ONLY FOR THOSE PATIENTS WHO HAVE BEEN TOLD THEY WILL RECEIVE SEDATION OR GENERAL ANESTHESIA DURING THEIR MRI PROCEDURE:  IF YOU WILL RECEIVE SEDATION (take medicine to help you relax during your exam):   You must get the medicine from your doctor before you arrive. Bring the medicine to the exam. Do not take it at home.   Arrive one hour early. Bring someone who can take you home after the test. Your medicine will make you sleepy. After the exam, you may not drive, take a bus or take a taxi by yourself.   No eating 8 hours before your exam. You may have clear liquids up until 4 hours before your exam. (Clear liquids include water, clear tea, black coffee and fruit juice without pulp.)  IF YOU WILL RECEIVE ANESTHESIA (be asleep for your exam):   Arrive 1 1/2 hours early. Bring someone who can take you home after the test. You may not drive, take a bus or take a taxi by yourself.   No eating 8 hours before your exam. You may have clear liquids up until 4 hours before your exam. (Clear liquids include water, clear tea, black coffee and fruit juice without pulp.)  Please call the Imaging Department at your exam site with any questions.            Mar 28, 2017  8:00 AM CDT   MR THORACIC SPINE W/O CONTRAST with UUMR1   [facility] [place] ([facility],  [facility])    [address] [facility] [address] [phone]           Take your medicines as usual, unless your doctor tells you not to. Bring a list of your current medicines to your exam (including vitamins, minerals and over-the-counter drugs). Also bring the results of similar scans you may have had.  Please remove any body piercings and hair extensions before you arrive.  Follow your doctor s orders. If you do not, we may have to postpone your exam.  You will not have contrast for this exam. You do not need to do anything special to prepare.  The MRI machine uses a strong magnet. Please wear clothes without metal (snaps, zippers). A sweatsuit works well, or we may give you a hospital gown.   **IMPORTANT** THE INSTRUCTIONS BELOW ARE ONLY FOR THOSE PATIENTS WHO HAVE BEEN TOLD THEY WILL RECEIVE SEDATION OR GENERAL ANESTHESIA DURING THEIR MRI PROCEDURE:  IF YOU WILL RECEIVE SEDATION (take medicine to help you relax during your exam):   You must get the medicine from your doctor before you arrive. Bring the medicine to the exam. Do not take it at home.   Arrive one hour early. Bring someone who can take you home after the test. Your medicine will make you sleepy. After the exam, you may not drive, take a bus or take a taxi by yourself.   No eating 8 hours before your exam. You may have clear liquids up until 4 hours before your exam. (Clear liquids include water, clear tea, black coffee and fruit juice without pulp.)  IF YOU WILL RECEIVE ANESTHESIA (be asleep for your exam):   Arrive 1 1/2 hours early. Bring someone who can take you home after the test. You may not drive, take a bus or take a taxi by yourself.   No eating 8 hours before your exam. You may have clear liquids up until 4 hours before your exam. (Clear liquids include water, clear tea, black coffee and fruit juice without pulp.)   You will spend four to five hours in the recovery room.  Please call the Imaging Department at  your exam site with any questions.            Apr 11, 2017  8:00 AM CDT   Masonic Lab Draw with  MASONIC LAB DRAW   [facility] Masonic Lab Draw ([facility])    [address] [address] [phone]            Apr 11, 2017  8:30 AM CDT   (Arrive by 8:15 AM)   Return Visit with Nenita Bartholomew MD   [facility] ([facility])    [address] [place] [address] [phone]            Apr 13, 2017  9:00 AM CDT   Masonic Lab Draw with  MASONIC LAB DRAW   [facility] Lab Draw ([facility])    [address] [place] [address] [phone]              Future tests that were ordered for you today     Open Future Orders        Priority Expected Expires Ordered    MR Thoracic Spine w/o Contrast Routine  3/21/2018 3/21/2017            Who to contact     Please call your clinic at [phone] to:    Ask questions about your health    Make or cancel appointments    Discuss your medicines    Learn about your test results    Speak to your doctor   If you have compliments or concerns about an experience at your clinic, or if you wish to file a complaint, please contact [facility] Patient Relations at [phone] or email us at [email or web address]         Additional Information About Your Visit        [employer] Information     [employer] gives you secure access to your electronic health record. If you see a primary care provider, you can also send messages to your care team and make appointments. If you have questions, please call your primary care clinic.  If you do not have a primary care provider, please call [phone] and they will assist you.      [employer] is an electronic gateway that provides easy, online access to your medical records. With [employer] you can request a clinic appointment, read your test  "results, renew a prescription or communicate with your care team.     To access your existing account, please contact your [facility] or call [phone] for assistance.        Care EveryWhere ID     This is your Care EveryWhere ID. This could be used by other organizations to access your [facility] medical records  [id number]        Your Vitals Were     Pulse Respirations Height Pulse Oximetry BMI (Body Mass Index)       121 16 1.88 m (6' 2\") 98% 30.81 kg/m2        Blood Pressure from Last 3 Encounters:   03/21/17 (!) 135/95   03/21/17 (!) 135/95   03/20/17 (!) 146/98    Weight from Last 3 Encounters:   03/21/17 104.4 kg (230 lb 3.2 oz)   03/21/17 104.9 kg (231 lb 3.2 oz)   03/20/17 103.6 kg (228 lb 8 oz)              Today, you had the following     No orders found for display         Where to get your medicines      Some of these will need a paper prescription and others can be bought over the counter.  Ask your nurse if you have questions.     Bring a paper prescription for each of these medications     oxyCODONE 5 MG capsule          Primary Care Provider    None Specified       No primary provider on file.        Thank you!     Thank you for choosing [facility], RADIATION ONCOLOGY  for your care. Our goal is always to provide you with excellent care. Hearing back from our patients is one way we can continue to improve our services. Please take a few minutes to complete the written survey that you may receive in the mail after your visit with us. Thank you!             Your Updated Medication List - Protect others around you: Learn how to safely use, store and throw away your medicines at www.disposemymeds.org.          This list is accurate as of: 3/20/17 11:59 PM.  Always use your most recent med list.                   Brand Name Dispense Instructions for use    albuterol 108 (90 BASE) MCG/ACT Inhaler   Generic drug:  albuterol      Inhale 1-2 puffs into the lungs daily as " needed       cyclobenzaprine 10 MG tablet    FLEXERIL    90 tablet    Take 1 tablet (10 mg) by mouth 3 times daily as needed for muscle spasms       * dexamethasone 4 MG tablet    DECADRON    12 tablet    Take 2 tablets (8 mg) by mouth 2 times daily (with meals) for 6 doses Begin the evening prior to Docetaxel, and on the morning of Docetaxel, then continue for 4 additional doses.       * dexamethasone 4 MG tablet    DECADRON    30 tablet    Take 8 mg the evening before, AM of, and bid x 2 days after docetaxel, then decrease to 4 mg daily x 3, then stop. Repeat with each cycle of Taxotere       * dexamethasone 4 MG tablet    DECADRON    60 tablet    Take 1 tablet (4 mg) by mouth 2 times daily (with meals)       famotidine 10 MG tablet    PEPCID    20 tablet    Take 2 tablets (20 mg) by mouth 2 times daily       FOLIC ACID PO      Take 800 mcg by mouth daily       IBUPROFEN PO      Take 1 tablet by mouth every 8 hours as needed       levETIRAcetam 100 MG/ML solution    KEPPRA     Take 10 mg/kg by mouth 2 times daily       lidocaine 5 % Patch    LIDODERM    30 patch    Apply up to 3 patches to painful area at once for up to 12 h within a 24 h period.  Remove after 12 hours.       LORazepam 0.5 MG tablet    ATIVAN    30 tablet    Take 1 tablet (0.5 mg) by mouth every 4 hours as needed (Anxiety, Nausea/Vomiting or Sleep)       magnesium hydroxide 2400 MG/10ML Susp    MOM     Take 5 mLs by mouth daily as needed for constipation Reported on 3/2/2017       ondansetron 8 MG tablet    ZOFRAN    90 tablet    Take 1 tablet (8 mg) by mouth every 8 hours as needed       OSCAL 500/200 D-3 PO          * oxyCODONE 10 MG 12 hr tablet    OxyCONTIN    30 tablet    Take 1 tablet (10 mg) by mouth At Bedtime maximum 1tablet(s) per day       * oxyCODONE 5 MG capsule    OXY-IR    200 capsule    Take 1-3 capsules (5-15 mg) by mouth every 4 hours as needed for moderate to severe pain       prochlorperazine 10 MG tablet    COMPAZINE    30  tablet    Take 1 tablet (10 mg) by mouth every 6 hours as needed (Nausea/Vomiting)       SENNA CO          TYLENOL PO      Take 500 mg by mouth daily as needed       * Notice:  This list has 5 medication(s) that are the same as other medications prescribed for you. Read the directions carefully, and ask your doctor or other care provider to review them with you.

## 2017-03-20 NOTE — MR AVS SNAPSHOT
After Visit Summary   3/20/2017    Naren Kimble    MRN: [id number]           Patient Information     Date Of Birth          1975        Visit Information        Provider Department      3/20/2017 3:15 PM John Frederick MD [facility]        Today's Diagnoses     Brain metastasis (H)    -  1       Follow-ups after your visit        Your next 10 appointments already scheduled     Mar 28, 2017  5:15 AM CDT   GAMMA TREATMENT with Elian Fraser MD   [facility], [place], Radiation Oncology ([facility])    [address] [place] [id number]   [phone]            Mar 28, 2017  6:30 AM CDT   GAMMA TREATMENT with John Frederick MD   [facility], Radiation Oncology ([facility])    [address] [place] [address]   [phone]            Mar 28, 2017  7:00 AM CDT   MR BRAIN W CONTRAST with UUMR1   [facility] [place], MRI ([facility])    [address] [facility] [address]   [phone]           Take your medicines as usual, unless your doctor tells you not to. Bring a list of your current medicines to your exam (including vitamins, minerals and over-the-counter drugs).  You will be given intravenous contrast for this exam. To prepare:   The day before your exam, drink extra fluids at least six 8-ounce glasses (unless your doctor tells you to restrict your fluids).   Have a blood test (creatinine test) within 30 days of your exam. Go to your clinic or Diagnostic Imaging Department for this test.  The MRI machine uses a strong magnet. Please wear clothes without metal (snaps, zippers). A sweatsuit works well, or we may give you a hospital gown.  Please remove any body piercings and hair extensions before you arrive. You will also remove watches, jewelry, hairpins, wallets, dentures, partial dental plates and hearing aids. You may wear contact lenses, and you may be able to wear your rings. We  have a safe place to keep your personal items, but it is safer to leave them at home.   **IMPORTANT** THE INSTRUCTIONS BELOW ARE ONLY FOR THOSE PATIENTS WHO HAVE BEEN TOLD THEY WILL RECEIVE SEDATION OR GENERAL ANESTHESIA DURING THEIR MRI PROCEDURE:  IF YOU WILL RECEIVE SEDATION (take medicine to help you relax during your exam):   You must get the medicine from your doctor before you arrive. Bring the medicine to the exam. Do not take it at home.   Arrive one hour early. Bring someone who can take you home after the test. Your medicine will make you sleepy. After the exam, you may not drive, take a bus or take a taxi by yourself.   No eating 8 hours before your exam. You may have clear liquids up until 4 hours before your exam. (Clear liquids include water, clear tea, black coffee and fruit juice without pulp.)  IF YOU WILL RECEIVE ANESTHESIA (be asleep for your exam):   Arrive 1 1/2 hours early. Bring someone who can take you home after the test. You may not drive, take a bus or take a taxi by yourself.   No eating 8 hours before your exam. You may have clear liquids up until 4 hours before your exam. (Clear liquids include water, clear tea, black coffee and fruit juice without pulp.)  Please call the Imaging Department at your exam site with any questions.            Mar 28, 2017  7:30 AM CDT   Procedure 2 hour with U2A ROOM 7   Unit 2A [facility] [place] ([facility])    [address] [facility] [address]               Mar 28, 2017  8:00 AM CDT   MR THORACIC SPINE W/O CONTRAST with UUMR1   [facility], [place], MRI ([facility])    [address] [facility] [address]   [phone]           Take your medicines as usual, unless your doctor tells you not to. Bring a list of your current medicines to your exam (including vitamins, minerals and over-the-counter drugs). Also bring the results of similar scans you may  have had.  Please remove any body piercings and hair extensions before you arrive.  Follow your doctor s orders. If you do not, we may have to postpone your exam.  You will not have contrast for this exam. You do not need to do anything special to prepare.  The MRI machine uses a strong magnet. Please wear clothes without metal (snaps, zippers). A sweatsuit works well, or we may give you a hospital gown.   **IMPORTANT** THE INSTRUCTIONS BELOW ARE ONLY FOR THOSE PATIENTS WHO HAVE BEEN TOLD THEY WILL RECEIVE SEDATION OR GENERAL ANESTHESIA DURING THEIR MRI PROCEDURE:  IF YOU WILL RECEIVE SEDATION (take medicine to help you relax during your exam):   You must get the medicine from your doctor before you arrive. Bring the medicine to the exam. Do not take it at home.   Arrive one hour early. Bring someone who can take you home after the test. Your medicine will make you sleepy. After the exam, you may not drive, take a bus or take a taxi by yourself.   No eating 8 hours before your exam. You may have clear liquids up until 4 hours before your exam. (Clear liquids include water, clear tea, black coffee and fruit juice without pulp.)  IF YOU WILL RECEIVE ANESTHESIA (be asleep for your exam):   Arrive 1 1/2 hours early. Bring someone who can take you home after the test. You may not drive, take a bus or take a taxi by yourself.   No eating 8 hours before your exam. You may have clear liquids up until 4 hours before your exam. (Clear liquids include water, clear tea, black coffee and fruit juice without pulp.)   You will spend four to five hours in the recovery room.  Please call the Imaging Department at your exam site with any questions.            Mar 30, 2017  2:00 PM CDT   Masonic Lab Draw with  MASONIC LAB DRAW   [facility] Masonic Lab Draw ([facility])    [address] [facility]  2nd [facility] [address] [phone]            Mar 30, 2017  2:30 PM CDT   Infusion 60 with   ONCOLOGY INFUSION,  29 ATC   [facility] ([facility] [place] [facility])    [address] [facility]  2nd [facility] [address] [phone]            Apr 11, 2017  8:00 AM CDT   [facility]   [facility] Lab Draw ([facility] [place] [facility])    [address] [id number] [phone]            Apr 11, 2017  8:30 AM CDT   (Arrive by 8:15 AM)   Return Visit with Nenita Bartholomew MD   [facility] ([facility])    [address] [place] [id number] [phone]              Who to contact     If you have questions or need follow up information about today's clinic visit or your schedule please contact [facility] directly at [phone].  Normal or non-critical lab and imaging results will be communicated to you by Topprhart, letter or phone within 4 business days after the clinic has received the results. If you do not hear from us within 7 days, please contact the clinic through Catalyzet or phone. If you have a critical or abnormal lab result, we will notify you by phone as soon as possible.  Submit refill requests through Catbird or call your pharmacy and they will forward the refill request to us. Please allow 3 business days for your refill to be completed.          Additional Information About Your Visit        Topprhart Information     Catbird gives you secure access to your electronic health record. If you see a primary care provider, you can also send messages to your care team and make appointments. If you have questions, please call your primary care clinic.  If you do not have a primary care provider, please call [phone] and they will assist you.        Care EveryWhere ID     This is your Care EveryWhere ID. This could be used by other organizations to access your [place] medical records  [id number]         Your Vitals Were     Pulse Temperature Respirations Pulse Oximetry BMI (Body Mass Index)       129 96.7  F (35.9  C) (Oral) 16 98% 29.34 kg/m2        Blood Pressure from Last 3 Encounters:   03/23/17 139/82   03/21/17 (!) 135/95   03/21/17 (!) 135/95    Weight from Last 3 Encounters:   03/23/17 104.3 kg (230 lb)   03/21/17 104.4 kg (230 lb 3.2 oz)   03/21/17 104.9 kg (231 lb 3.2 oz)              Today, you had the following     No orders found for display         Where to get your medicines      Some of these will need a paper prescription and others can be bought over the counter.  Ask your nurse if you have questions.     Bring a paper prescription for each of these medications     oxyCODONE 5 MG capsule          Primary Care Provider    None Specified       No primary provider on file.        Thank you!     Thank you for choosing [facility]  for your care. Our goal is always to provide you with excellent care. Hearing back from our patients is one way we can continue to improve our services. Please take a few minutes to complete the written survey that you may receive in the mail after your visit with us. Thank you!             Your Updated Medication List - Protect others around you: Learn how to safely use, store and throw away your medicines at www.disposemymeds.org.          This list is accurate as of: 3/20/17 11:59 PM.  Always use your most recent med list.                   Brand Name Dispense Instructions for use    albuterol 108 (90 BASE) MCG/ACT Inhaler   Generic drug:  albuterol      Inhale 1-2 puffs into the lungs daily as needed       cyclobenzaprine 10 MG tablet    FLEXERIL    90 tablet    Take 1 tablet (10 mg) by mouth 3 times daily as needed for muscle spasms       * dexamethasone 4 MG tablet    DECADRON    12 tablet    Take 2 tablets (8 mg) by mouth 2 times daily (with meals) for 6 doses Begin the evening prior to Docetaxel, and on the morning of Docetaxel, then continue for  4 additional doses.       * dexamethasone 4 MG tablet    DECADRON    60 tablet    Take 1 tablet (4 mg) by mouth 2 times daily (with meals)       famotidine 10 MG tablet    PEPCID    20 tablet    Take 2 tablets (20 mg) by mouth 2 times daily       FOLIC ACID PO      Take 800 mcg by mouth daily       IBUPROFEN PO      Take 1 tablet by mouth every 8 hours as needed       levETIRAcetam 100 MG/ML solution    KEPPRA     Take 10 mg/kg by mouth 2 times daily       lidocaine 5 % Patch    LIDODERM    30 patch    Apply up to 3 patches to painful area at once for up to 12 h within a 24 h period.  Remove after 12 hours.       LORazepam 0.5 MG tablet    ATIVAN    30 tablet    Take 1 tablet (0.5 mg) by mouth every 4 hours as needed (Anxiety, Nausea/Vomiting or Sleep)       magnesium hydroxide 2400 MG/10ML Susp    MOM     Take 5 mLs by mouth daily as needed for constipation Reported on 3/2/2017       ondansetron 8 MG tablet    ZOFRAN    90 tablet    Take 1 tablet (8 mg) by mouth every 8 hours as needed       OSCAL 500/200 D-3 PO          * oxyCODONE 10 MG 12 hr tablet    OxyCONTIN    30 tablet    Take 1 tablet (10 mg) by mouth At Bedtime maximum 1tablet(s) per day       * oxyCODONE 5 MG capsule    OXY-IR    200 capsule    Take 1-3 capsules (5-15 mg) by mouth every 4 hours as needed for moderate to severe pain       prochlorperazine 10 MG tablet    COMPAZINE    30 tablet    Take 1 tablet (10 mg) by mouth every 6 hours as needed (Nausea/Vomiting)       SENNA CO          TYLENOL PO      Take 500 mg by mouth daily as needed       * Notice:  This list has 4 medication(s) that are the same as other medications prescribed for you. Read the directions carefully, and ask your doctor or other care provider to review them with you.

## 2017-03-20 NOTE — NURSING NOTE
"Naren Kimble is a 42 year old male who presents for:  Chief Complaint   Patient presents with     Oncology Clinic Visit     New Eval for Gamma Knife        Initial Vitals:  BP (!) 146/98 (BP Location: Right arm, Patient Position: Right side, Cuff Size: Adult Large)  Pulse 129  Temp 96.7  F (35.9  C) (Oral)  Resp 16  Wt 103.6 kg (228 lb 8 oz)  SpO2 98%  BMI 29.34 kg/m2 Estimated body mass index is 29.34 kg/(m^2) as calculated from the following:    Height as of an earlier encounter on 3/20/17: 1.88 m (6' 2\").    Weight as of this encounter: 103.6 kg (228 lb 8 oz).. Body surface area is 2.33 meters squared. BP completed using cuff size: large  Data Unavailable No LMP for male patient. Allergies and medications reviewed.     Medications: Medication refills not needed today.  Pharmacy name entered into [employer]:    [facility] [place], [place] - [address] [place] [address] [address] [place] [address] [address]    Comments: Pt is here for a new consult    6 minutes for nursing intake (face to face time)   Tresa Eldridge MA        "

## 2017-03-21 NOTE — NURSING NOTE
"Naren Kimble is a 42 year old male who presents for:  Chief Complaint   Patient presents with     Blood Draw     Pt with hx of lung ca labs collected via venipuncture.      Oncology Clinic Visit     Patient is seeing provider relating to CT Scan result        Initial Vitals:  BP (!) 135/95 (BP Location: Right arm, Patient Position: Chair, Cuff Size: Adult Regular)  Pulse 115  Temp 97.5  F (36.4  C) (Oral)  Resp 20  Ht 1.88 m (6' 2\")  Wt 104.4 kg (230 lb 3.2 oz)  SpO2 97%  BMI 29.56 kg/m2 Estimated body mass index is 29.56 kg/(m^2) as calculated from the following:    Height as of this encounter: 1.88 m (6' 2\").    Weight as of this encounter: 104.4 kg (230 lb 3.2 oz).. Body surface area is 2.33 meters squared. BP completed using cuff size: NA (Not Taken)  No Pain (0) No LMP for male patient. Allergies and medications reviewed.     Medications: Medication refills not needed today.  Pharmacy name entered into [employer]:    [facility] [place], [place] - [address] [place] [address] [phone]  [facility] [address] [place] [address] [address]    Comments: Patient is not in any pain today. Vitals taken in lab today    6 minutes for nursing intake (face to face time)   Barbi Bacon LPN      "

## 2017-03-21 NOTE — NURSING NOTE
Chief Complaint   Patient presents with     Blood Draw     Pt with hx of lung ca labs collected via venipuncture.

## 2017-03-21 NOTE — PROGRESS NOTES
CONSULTATION NOTE  DATE OF SERVICE: 03/20/2017    HISTORY OF PRESENT ILLNESS:  Mr. Kimble is a 42 year old gentleman with a history of non-small cell lung cancer who is diagnosed to have brain metastasis and seeing for possible Gamma Knife radiation.  He initially presented in July 2016 with a brain mass, which was excised and consistent with metastatic adenocarcinoma of the lung. CT Chest on 7/28/2016 was negative for pulmonary mass, and EGFR and ALK studies were negative for targetable mutations. He underwent postoperative stereotactic fractionated radiotherapy to the tumor bed by Dr. Hartmann, from 9/12/16-9/16/16; the tumor bed received 3000 cGy in 5 fractions.   His brain lesion was consistent with metastatic adenocarcinoma of the lung. Negative EGFR and ALK rearrangement studies. CT scan in 07/2016 did not have any definitive mass in the lung. PET/CT 08/2015 showed 1 cm mass in the left lower lobe. Bronchoscopy was negative with the [facility] system.   He proceeded with cisplatin and Alimta for 6 cycles  and started chemotherapy 09/19/2016. Imaging after 2 cycles of chemotherapy were consistent with metastatic disease in the bone. He was started on Zometa and has had 3 cycles of adjuvant cisplatin and Alimta.      AIH680 with Nivolumab first He proceeded with cisplatin and Alimta for 6 cycles after stereotactic radiation to resection bed and started chemotherapy 09/19/2016. Imaging after 2 cycles of chemotherapy were consistent with metastatic disease in the bone. He was started on Zometa and has had 3 cycles of adjuvant cisplatin and Alimta.treatment 11/23/16.   He showed progression on scan 2/6/17 and started 2/7/2017 Txotere, completed 2 cycles.  On 3/21/2017 Vinorelbine/gemcitabine  He also received palliative radiation to Thoracic Spine / Pelvis / Left Hip at [facility] by Dr. Kovacs from 1/16/17 thru 1/20/17 with   Total dose  2000 cGy each site over 5 fractions       The patient was seen as a follow  up at medical oncology clinic as he  returned from a vacation. He has lower back pain but no pain in the upper back area.  He ha severe right neck pain which improved with Decadron per Dr. Bartholomew.  On review of his imaging study, he has destructive spinal lesion which was not radiated before.     CHEMOTHERAPY HISTORY: As per HPI     PAST RADIATION THERAPY HISTORY: As per HPI, fractionated radiosurgery to the intracranial tumor bed, 3000 cGy in 5 fractions, by Dr. Hartmann at [facility].  Thoracic Spine / Pelvis / Left Hip at [facility] by Dr. Kovacs from 1/16/17 thru 1/20/17 with   Total dose  2000 cGy each site over 5 fractions       PAST MEDICAL HISTORY:  1. Lung cancer as per HPI  2. Obstructive sleep apnea     PAST SURGICAL HISTORY:  1. Left frontal craniotomy as per HPI  2. Bronchoscopy with biopsies 8/8/16 as per HPI.     ALLERGIES:  No known drug allergies.     MEDICATIONS: Reviwed     FAMILY HISTORY:  He has no pertinent oncologic family history. No known oncologic syndromes or history of lung cancer.     SOCIAL HISTORY:  Valerio is , he is a lifelong never smoker, and drinks alcohol rarely.     REVIEW OF SYSTEMS: A 10 point review of systems was obtained. Pertinent findings are noted in the HPI and are otherwise unremarkable.      PHYSICAL EXAM:  VITALS: /95 mmHg  Pulse 115, Weight 230 lbs  GEN: Appears well, alert, oriented, and in NAD  HEENT: EOMI, normal conjunctiva, MMM, no thrush  NECK: Supple, full ROM, no cervical or clavicular lymphadenopathy. Tenderness of neck  CV: Normal rate, regular rhythm, no murmurs/rubs/gallops, warm and well-perfused  RESP: CTAB, no wheezes/rales/rhonchi, breathing comfortably on room air  ABDOMEN: Soft, NT, ND, bowel sounds present  MUSCULOSKELETAL: No instability or reproducible pain on vertebral palpation. Valerio localizes his pain to the lower lumbar spine and upper sacrum, primarily at midline but also within 3cm to the left of midline.  NEURO: No focal  deficits, CN 3-12 grossly intact, normal and symmetric motor and sensory exam in bilateral upper and lower extremities, normal gait  PSYCH: Appropriate mood and affect     ECOG PERFORMANCE STATUS: 1     PACEMAKER: None     ASSESSMENT AND RECOMMENDATION:  Mr. Kimble is a 42year old gentleman with metastatic non-small cell lung cancer, with diffusely metastatic disease most significantly in the axial and appendicular skeleton. He has now new brain metastasis in addition to his progression of disease and Gamma Knife radiosurgery is considered for the brain metastasis. He is scheduled to be seen by Dr. Frederick this afternoon.     The rationale, logistics, risks, benefits, and potential side effects of the proposed treatment were reviewed in detail. The patient had many questions during our conversation today, which were answered to the best of our ability.      Thank you letting us to participate on this patient care.    Elian Fraser MD

## 2017-03-21 NOTE — PATIENT INSTRUCTIONS
1- Discontinue Taxotere  2.  Chemo education- Gemcitabine and Vinorelbine day 1,8 on 21 day cycle. Start chemotherapy this week.  3.  Change to dexamethasone 2 mg BID  4.  Schedule an appointment with spine surgery for T3/T5 metastasis  5-  RTC MD 3 weeks with CBC diff, CMP  6-  Proceed with gamma knife as planned  7-  Start protonix 40 mg daily        March 2017 Sunday Monday Tuesday Wednesday Thursday Friday Saturday                  1     2     [facility] MASONIC LAB DRAW    9:00 AM   (15 min.)    MASONIC LAB DRAW   [facility] Lab Draw     [facility] RETURN    9:15 AM   (50 min.)   Yulia Lepe, APRN CNP   [facility] ONC INFUSION 120   11:00 AM   (120 min.)    ONCOLOGY INFUSION   [facility] 3     4       5     6     MR BRAIN W   11:00 AM   (60 min.)   [address], MRI 7     8     9     10     11       12     13     14     15     16     17     18       19     20     CT CHEST/ABDOMEN/PELVIS W    9:25 AM   (20 min.)   UCCT2   [facility] CT     [facility] GAMMA KNIFE CONSULT   12:00 PM   (90 min.)   Elian Fraser MD   [facility], Radiation Oncology     [facility] NEW    3:00 PM   (30 min.)   John Frederick MD   [facility] 21     [facility] MASONIC LAB DRAW    8:30 AM   (15 min.)    MASONIC LAB DRAW   [facility] Lab Draw     [facility] RETURN    8:45 AM   (30 min.)   Nenita Bartholomew MD   [facility] 22     23     [facility] MASONIC LAB DRAW    8:30 AM   (15 min.)    MASONIC LAB DRAW   [facility] Lab Draw     [facility] ONC INFUSION 60    9:00 AM   (60 min.)    ONCOLOGY INFUSION   [facility] 24     25       26     27     28     [facility] GAMMA TREATMENT    5:15 AM   (60 min.)   Elian Fraser MD   [facility], Radiation Oncology     [facility] GAMMA TREATMENT    6:30 AM   (60 min.)   John Frederick MD   [facility], Radiation Oncology     MR BRAIN W    7:00 AM   (30 min.)   [address],  [place], [facility] 29 30 31 April 2017 Sunday Monday Tuesday Wednesday Thursday Friday Saturday                                 1       2     3     4     5     6     7     8       9     10     11     [facility] MAS[facility] LAB DRAW    8:00 AM   (15 min.)    [facility] LAB DRAW   [facility]     [facility] RETURN    8:15 AM   (30 min.)   Nenita Bartholomew MD   [facility] 12     13     [facility] MAS[facility]IC LAB DRAW    9:00 AM   (15 min.)    MASONIC LAB DRAW   [facility] Masonic Lab Draw     [facility] ONC INFUSION 60    9:30 AM   (60 min.)   [facility] ONCOLOGY INFUSION   [facility] 14     15       16     17     18     19     20     21     22       23     24     25     26     27     28     29       30                                              May 2017   Jayce Monday Tuesday Wednesday Thursday Friday Saturday        1     2     3     4     [facility] LAB DRAW    9:00 AM   (15 min.)    [facility] LAB DRAW   [facility]     [facility] ONC INFUSION 60    9:30 AM   (60 min.)   [facility] ONCOLOGY INFUSION   [facility] 5     6       7     8     9     10     11     12     13       14     15     16     17     18     19     20       21     22     23     24     25     [facility] ONC INFUSION 60    9:30 AM   (60 min.)    ONCOLOGY INFUSION   [facility] 26     27       28     29     30     31

## 2017-03-21 NOTE — MR AVS SNAPSHOT
After Visit Summary   3/21/2017    Naren Kimble    MRN: [id number]           Patient Information     Date Of Birth          1975        Visit Information        Provider Department      3/21/2017 9:00 AM Nenita Bartholomew MD [facility]        Today's Diagnoses     Non-small cell lung cancer (NSCLC) (H)    -  1    Left-sided low back pain without sciatica, unspecified chronicity        Brain metastasis (H)          Care Instructions    1- Discontinue Taxotere  2.  Chemo education- Gemcitabine and Vinorelbine day 1,8 on 21 day cycle. Start chemotherapy this week.  3.  Change to dexamethasone 2 mg BID  4.  Schedule an appointment with spine surgery for T3/T5 metastasis  5-  RTC MD 3 weeks with CBC diff, CMP  6-  Proceed with gamma knife as planned  7-  Start protonix 40 mg daily        March 2017 Sunday Monday Tuesday Wednesday Thursday Friday Saturday                  1     2     [facility] LAB DRAW    9:00 AM   (15 min.)   [facility] DRAW   [facility] Lab Draw     [facility] RETURN    9:15 AM   (50 min.)   Yulia Lepe APRN CNP   [facility] ONC INFUSION 120   11:00 AM   (120 min.)    ONCOLOGY INFUSION   [facility] 3     4       5     6     MR BRAIN W   11:00 AM   (60 min.)   UUMR1   [facility], [place], MRI 7     8     9     10     11       12     13     14     15     16     17     18       19     20     CT CHEST/ABDOMEN/PELVIS W    9:25 AM   (20 min.)   UCCT2   [facility] CT     [facility] GAMMA KNIFE CONSULT   12:00 PM   (90 min.)   Elian Fraser MD   [facility], [place], Radiation Oncology     [facility] NEW    3:00 PM   (30 min.)   John Frederick MD   [facility] 21     [facility] LAB DRAW    8:30 AM   (15 min.)    [facility] LAB DRAW   [facility] Lab Draw     [facility] RETURN    8:45 AM   (30 min.)   Nenita Bartholomew MD   [facility] 22     23     [facility] LAB DRAW    8:30 AM    (15 min.)    [facility] LAB DRAW   [facility] Masonic Lab Draw     [facility] ONC INFUSION 60    9:00 AM   (60 min.)   UC ONCOLOGY INFUSION   [facility] 24     25       26     27     28     [facility] GAMMA TREATMENT    5:15 AM   (60 min.)   Elian Fraser MD   [facility], [place], Radiation Oncology     P GAMMA TREATMENT    6:30 AM   (60 min.)   John Frederick MD   [facility] [place], Radiation Oncology     MR BRAIN W    7:00 AM   (30 min.)   UUMR1   [facility] [place], MRI 29     30     31 April 2017 Sunday Monday Tuesday Wednesday Thursday Friday Saturday                                 1       2     3     4     5     6     7     8       9     10     11     P [facility] LAB DRAW    8:00 AM   (15 min.)    [facility]   [facility]     [facility] RETURN    8:15 AM   (30 min.)   Nenita Bartholomew MD   [facility] 12     13     P [facility] LAB DRAW    9:00 AM   (15 min.)    [facility] LAB DRAW   [facility] Mason[facility] Lab Draw     [facility] ONC INFUSION 60    9:30 AM   (60 min.)    ONCOLOGY INFUSION   [facility] 14     15       16     17     18     19     20     21     22       23     24     25     26     27     28     29       30                                              May 2017   Jayce Monday Tuesday Wednesday Thursday Friday Saturday        1     2     3     4     [facility] LAB DRAW    9:00 AM   (15 min.)    [facility] LAB DRAW   [facility] Mason[facility] Lab Draw     [facility] ONC INFUSION 60    9:30 AM   (60 min.)   UC ONCOLOGY INFUSION   [facility] 5     6       7     8     9     10     11     12     13       14     15     16     17     18     19     20       21     22     23     24     25     [facility] ONC INFUSION 60    9:30 AM   (60 min.)    ONCOLOGY INFUSION   [facility] 26     27       28     29     30     31                                   Follow-ups after your visit        Additional Services     SPINE SURGERY  REFERRAL       T5 fracture                  Your next 10 appointments already scheduled     Mar 23, 2017  8:30 AM CDT   [facility] Lab Draw with  M[facility]IC LAB DRAW   [facility] Lab Draw ([facility])    [address]  [address] [facility] [address]   [phone]            Mar 23, 2017  9:00 AM CDT   Infusion 60 with UC ONCOLOGY INFUSION, UC 25 ATC   [facility] Cancer Clinic ([facility])    [address]  [address] [facility] [address] [phone]            Mar 28, 2017  5:15 AM CDT   GAMMA TREATMENT with Elian Fraser MD   [facility], [place], Radiation Oncology ([facility])    [address] [address] [phone]            Mar 28, 2017  6:30 AM CDT   GAMMA TREATMENT with John Frederick MD   [facility] [place], Radiation Oncology ([facility])    [address] [address] [phone]            Mar 28, 2017  7:00 AM CDT   MR BRAIN W CONTRAST with UUMR1   [facility] [place], MRI ([facility])    [address] [address] [phone]           Take your medicines as usual, unless your doctor tells you not to. Bring a list of your current medicines to your exam (including vitamins, minerals and over-the-counter drugs).  You will be given intravenous contrast for this exam. To prepare:   The day before your exam, drink extra fluids at least six 8-ounce glasses (unless your doctor tells you to restrict your fluids).   Have a blood test (creatinine test) within 30 days of your exam. Go to your clinic or Diagnostic Imaging Department for this test.  The MRI machine uses a strong magnet. Please wear clothes without metal (snaps, zippers). A sweatsuit works well, or we may give you a hospital gown.  Please remove any body piercings and hair extensions before you arrive. You will also remove watches, jewelry, hairpins, wallets,  dentures, partial dental plates and hearing aids. You may wear contact lenses, and you may be able to wear your rings. We have a safe place to keep your personal items, but it is safer to leave them at home.   **IMPORTANT** THE INSTRUCTIONS BELOW ARE ONLY FOR THOSE PATIENTS WHO HAVE BEEN TOLD THEY WILL RECEIVE SEDATION OR GENERAL ANESTHESIA DURING THEIR MRI PROCEDURE:  IF YOU WILL RECEIVE SEDATION (take medicine to help you relax during your exam):   You must get the medicine from your doctor before you arrive. Bring the medicine to the exam. Do not take it at home.   Arrive one hour early. Bring someone who can take you home after the test. Your medicine will make you sleepy. After the exam, you may not drive, take a bus or take a taxi by yourself.   No eating 8 hours before your exam. You may have clear liquids up until 4 hours before your exam. (Clear liquids include water, clear tea, black coffee and fruit juice without pulp.)  IF YOU WILL RECEIVE ANESTHESIA (be asleep for your exam):   Arrive 1 1/2 hours early. Bring someone who can take you home after the test. You may not drive, take a bus or take a taxi by yourself.   No eating 8 hours before your exam. You may have clear liquids up until 4 hours before your exam. (Clear liquids include water, clear tea, black coffee and fruit juice without pulp.)  Please call the Imaging Department at your exam site with any questions.            Apr 11, 2017  8:00 AM CDT   [facility] Lab Draw with  [employer] LAB DRAW   [facility] ([facility])    [address] [place] [address]   [phone]            Apr 11, 2017  8:30 AM CDT   (Arrive by 8:15 AM)   Return Visit with Nenita Bartholomew MD   [facility] ([facility])    [address] [place] [address]   [phone]            Apr 13, 2017  9:00 AM CDT   [facility] Lab Draw with  [employer] LAB DRAW  "  [facility] Lab Draw ([facility])    [address]  2nd [address]  [facility] [address] [phone]            Apr 13, 2017  9:30 AM CDT   Infusion 60 with UC ONCOLOGY INFUSION   [facility] Cancer Clinic ([facility])    [address]  [address] [facility] [id number] [phone]              Who to contact     If you have questions or need follow up information about today's clinic visit or your schedule please contact [facility] directly at [phone].  Normal or non-critical lab and imaging results will be communicated to you by [employer], letter or phone within 4 business days after the clinic has received the results. If you do not hear from us within 7 days, please contact the clinic through [employer] or phone. If you have a critical or abnormal lab result, we will notify you by phone as soon as possible.  Submit refill requests through [employer] or call your pharmacy and they will forward the refill request to us. Please allow 3 business days for your refill to be completed.          Additional Information About Your Visit        [employer] Information     [employer] gives you secure access to your electronic health record. If you see a primary care provider, you can also send messages to your care team and make appointments. If you have questions, please call your primary care clinic.  If you do not have a primary care provider, please call [phone] and they will assist you.        Care EveryWhere ID     This is your Care EveryWhere ID. This could be used by other organizations to access your [facility] medical records  [id number]        Your Vitals Were     Pulse Temperature Respirations Height Pulse Oximetry BMI (Body Mass Index)    115 97.5  F (36.4  C) (Oral) 20 1.88 m (6' 2\") 97% 29.56 kg/m2       Blood Pressure from Last 3 Encounters:   03/21/17 (!) 135/95   03/21/17 (!) 135/95   03/20/17 (!) 146/98    " Weight from Last 3 Encounters:   03/21/17 104.4 kg (230 lb 3.2 oz)   03/21/17 104.9 kg (231 lb 3.2 oz)   03/20/17 103.6 kg (228 lb 8 oz)              We Performed the Following     CBC with platelets differential     Comprehensive metabolic panel     SPINE SURGERY REFERRAL          Today's Medication Changes          These changes are accurate as of: 3/21/17 10:07 AM.  If you have any questions, ask your nurse or doctor.               Start taking these medicines.        Dose/Directions    pantoprazole 40 MG EC tablet   Commonly known as:  PROTONIX   Used for:  Non-small cell lung cancer (NSCLC) (H)   Started by:  Nenita Bartholomew MD        Dose:  40 mg   Take 1 tablet (40 mg) by mouth daily   Quantity:  30 tablet   Refills:  3         These medicines have changed or have updated prescriptions.        Dose/Directions    * dexamethasone 4 MG tablet   Commonly known as:  DECADRON   This may have changed:  Another medication with the same name was removed. Continue taking this medication, and follow the directions you see here.   Used for:  Brain metastasis (H), Non-small cell lung cancer (NSCLC) (H)   Changed by:  Yulia Lepe APRN CNP        Take 8 mg the evening before, AM of, and bid x 2 days after docetaxel, then decrease to 4 mg daily x 3, then stop. Repeat with each cycle of Taxotere   Quantity:  30 tablet   Refills:  0       * dexamethasone 4 MG tablet   Commonly known as:  DECADRON   This may have changed:  Another medication with the same name was removed. Continue taking this medication, and follow the directions you see here.   Used for:  Non-small cell lung cancer, unspecified laterality (H)   Changed by:  Yulia Lepe APRN CNP        Dose:  4 mg   Take 1 tablet (4 mg) by mouth 2 times daily (with meals)   Quantity:  60 tablet   Refills:  0       * Notice:  This list has 2 medication(s) that are the same as other medications prescribed for you. Read the directions carefully, and ask your doctor or  other care provider to review them with you.      Stop taking these medicines if you haven't already. Please contact your care team if you have questions.     famotidine 10 MG tablet   Commonly known as:  PEPCID   Stopped by:  Nenita Bartholomew MD           LORazepam 0.5 MG tablet   Commonly known as:  ATIVAN   Stopped by:  Nenita Bartholomew MD                Where to get your medicines      These medications were sent to [id number] [place] [facility] [address]  [address]  [address] , [facility] [address]     Phone:  [phone]     pantoprazole 40 MG EC tablet         Some of these will need a paper prescription and others can be bought over the counter.  Ask your nurse if you have questions.     Bring a paper prescription for each of these medications     fentaNYL 50 mcg/hr 72 hr patch                Primary Care Provider    None Specified       No primary provider on file.        Thank you!     Thank you for choosing [facility]  for your care. Our goal is always to provide you with excellent care. Hearing back from our patients is one way we can continue to improve our services. Please take a few minutes to complete the written survey that you may receive in the mail after your visit with us. Thank you!             Your Updated Medication List - Protect others around you: Learn how to safely use, store and throw away your medicines at www.disposemymeds.org.          This list is accurate as of: 3/21/17 10:07 AM.  Always use your most recent med list.                   Brand Name Dispense Instructions for use    albuterol 108 (90 BASE) MCG/ACT Inhaler   Generic drug:  albuterol      Inhale 1-2 puffs into the lungs daily as needed       cyclobenzaprine 10 MG tablet    FLEXERIL    90 tablet    Take 1 tablet (10 mg) by mouth 3 times daily as needed for muscle spasms       * dexamethasone 4 MG tablet    DECADRON    30 tablet    Take 8 mg the evening before, AM of, and  bid x 2 days after docetaxel, then decrease to 4 mg daily x 3, then stop. Repeat with each cycle of Taxotere       * dexamethasone 4 MG tablet    DECADRON    60 tablet    Take 1 tablet (4 mg) by mouth 2 times daily (with meals)       fentaNYL 50 mcg/hr 72 hr patch    DURAGESIC    10 patch    Place 1 patch onto the skin every 72 hours       FOLIC ACID PO      Take 800 mcg by mouth daily       IBUPROFEN PO      Take 1 tablet by mouth every 8 hours as needed       levETIRAcetam 100 MG/ML solution    KEPPRA     Take 10 mg/kg by mouth 2 times daily       lidocaine 5 % Patch    LIDODERM    30 patch    Apply up to 3 patches to painful area at once for up to 12 h within a 24 h period.  Remove after 12 hours.       magnesium hydroxide 2400 MG/10ML Susp    MOM     Take 5 mLs by mouth daily as needed for constipation Reported on 3/2/2017       ondansetron 8 MG tablet    ZOFRAN    90 tablet    Take 1 tablet (8 mg) by mouth every 8 hours as needed       OSCAL 500/200 D-3 PO          * oxyCODONE 10 MG 12 hr tablet    OxyCONTIN    30 tablet    Take 1 tablet (10 mg) by mouth At Bedtime maximum 1tablet(s) per day       * oxyCODONE 5 MG capsule    OXY-IR    200 capsule    Take 1-3 capsules (5-15 mg) by mouth every 4 hours as needed for moderate to severe pain       pantoprazole 40 MG EC tablet    PROTONIX    30 tablet    Take 1 tablet (40 mg) by mouth daily       prochlorperazine 10 MG tablet    COMPAZINE    30 tablet    Take 1 tablet (10 mg) by mouth every 6 hours as needed (Nausea/Vomiting)       SENNA CO          TYLENOL PO      Take 500 mg by mouth daily as needed       * Notice:  This list has 4 medication(s) that are the same as other medications prescribed for you. Read the directions carefully, and ask your doctor or other care provider to review them with you.

## 2017-03-21 NOTE — LETTER
3/21/2017       RE: Naren Kimble  [address]  [facility] [address]     Dear Colleague,    Thank you for referring your patient, Naren Kimble, to the [facility]. Please see a copy of my visit note below.    [facility]YS[facility]  HEMATOLOGY ONCOLOGY    ONCOLOGY FOLLOWUP NOTE      DIAGNOSIS:  Stage IV non-small cell lung cancer.  Initially presented in 07/2016 with a brain mass, which was resected and was consistent with metastatic adenocarcinoma of the lung.  Negative EGFR and ALK rearrangement studies. CT scan in 07/2016 did not have any definitive mass in the lung.  PET/CT 08/2015 showed 1 cm mass in the left lower lobe.  [facility] with [facility].       TREATMENT:  He proceeded with cisplatin and Alimta for 6 cycles after stereotactic radiation to resection bed and started chemotherapy 09/19/2016.  Imaging after 2 cycles of chemotherapy were consistent with metastatic disease in the bone.  He was started on Zometa and has had 3 cycles of adjuvant cisplatin and Alimta.     [id number] with Nivolumab first He proceeded with cisplatin and Alimta for 6 cycles after stereotactic radiation to resection bed and started chemotherapy 09/19/2016.  Imaging after 2 cycles of chemotherapy were consistent with metastatic disease in the bone.  He was started on Zometa and has had 3 cycles of adjuvant cisplatin and Alimta.treatment 11/23/16. Progression on scan 2/6/17.  2/7/2017 Txotere, completed 2 cycles.   3/21/2017 Vinorelbine/gemcitabine     SUBJECTIVE:  The patient was seen as a followup today. He has returned from a vacation. He has lower back pain but no pain in the upper back area. He does not have any new neurologic symptoms.     REVIEW OF SYSTEMS:  A complete review of systems was performed and found to be negative other than pertinent positives mentioned in history of present illness.     Past medical, social histories reviewed.    Meds-  "Reviewed.     PHYSICAL EXAMINATION:   VITAL SIGNS: BP (!) 135/95 (BP Location: Right arm, Patient Position: Chair, Cuff Size: Adult Regular)  Pulse 115  Temp 97.5  F (36.4  C) (Oral)  Resp 20  Ht 1.88 m (6' 2\")  Wt 104.4 kg (230 lb 3.2 oz)  SpO2 97%  BMI 29.56 kg/m2  GENERAL: Sitting comfortably.   HEENT: Pupils are equal. Oropharynx is clear.   NECK: Right cervical adenopathy is stable.   LUNGS: Clear bilaterally.   HEART: S1, S2, regular.   ABDOMEN: Soft, nontender, nondistended, no hepatosplenomegaly.   EXTREMITIES: Warm, well perfused.   NEUROLOGIC: Alert, awake.   SKIN:No rash  LYMPHATICS: No edema.      LABORATORY DATA:    Recent Labs   Lab Test  03/02/17   0921 01/31/17   0957  01/24/17   1321   NA   --   134  134   POTASSIUM   --   4.2  4.4   CHLORIDE   --   100  100   CO2   --   26  26   ANIONGAP   --   8  8   BUN   --   13  16   CR  0.63*  0.66  0.66   GLC   --   95  61*   ROSENDO  9.0  9.1  8.9   MAG   --   1.9  2.3   PHOS   --   3.9  4.0     Recent Labs   Lab Test  03/02/17 0921 02/09/17   1006  01/31/17   0957   WBC  7.3  7.4  4.4   HGB  8.5*  9.9*  9.0*   PLT  395  352  201   MCV  81  84  84   NEUTROPHIL  83.4  84.4  73.0     Recent Labs   Lab Test  03/02/17 0921 02/09/17   1006  01/31/17   0957  01/24/17   1321  01/17/17   0830   BILITOTAL  0.3  0.2  0.3  0.3  0.3   ALKPHOS  69  66  58  60  61   ALT  13  18  17  17  18   AST  20  22  20  22  21   ALBUMIN  3.0*  3.3*  3.0*  3.1*  3.2*   LDH   --    --   402*  489*  407*         Results for orders placed or performed in visit on 03/20/17   CT Chest/Abdomen/Pelvis w Contrast    Narrative    EXAMINATION: CT CHEST/ABDOMEN/PELVIS W CONTRAST, 3/20/2017 10:03 AM    TECHNIQUE:  Helical CT images from the thoracic inlet through the  symphysis pubis were obtained with contrast. Contrast dose: 135 mL of  Isovue-370    COMPARISON: PET/CT, 2/6/2017, 12/23/2016, and 11/10/2016 and outside  CT CAP, 7/28/2016    HISTORY: Metastatic non-small cell lung cancer on " chemotherapy    FINDINGS:    Chest: A 2.6 x 3.5 cm left lower lobe mass (series 6, image 97)  previously measured 2.4 x 3.4 cm when measured in the same planes. No  new or enlarging pulmonary masses are identified. Calcified  granulomata are unchanged. The heart size is within normal limits. No  pericardial or pleural effusion. No pneumothorax. Enlarged left hilar  lymph nodes, the largest measuring 1.7 cm on series 3, image 173, are  unchanged. No new lymphadenopathy. The visualized thyroid is  unremarkable. The great vessels of the chest are within normal limits.  The airway is patent. No bronchial wall thickening or bronchiectasis.    Abdomen and pelvis: A 2.0 x 2.2 cm hypoenhancing segment 8 lesion  (series 3, image 229) previously measured 0.8 x 1.3 cm. Ill-defined  1.6 x 1.7 cm segment 6 lesion (series 3, image 275) and a 1.1 x 1.3 cm  lesion in the caudate (series 3, image 283) were not visualized  previously. Bilateral adrenal masses are increased in size, from 2.7 x  4.7 cm to 4.5 x 5.7 cm on the right and from 2.2 x 3.2 cm to 4.3 x 4.2  cm on the left. A 7 mm retroperitoneal lymph node (series 3, image  370) and a 6 mm omental nodule (series 3, image 446) are new since the  previous study.    The spleen, pancreas, and kidneys are within normal limits. The  stomach and small bowel are within normal limits. A few colonic  diverticula are unchanged. There is no wall thickening or pericolic  stranding to suggest diverticulitis. Normal appendix. The major  abdominal vessels are patent and normal in caliber.    Bones and soft tissues: There is new 25 is 50% loss of vertebral body  height at T5 without associated retropulsion of the vertebral body.  Innumerable lytic lesions, some of which are associated with soft  tissue masses, worsened from previous. For example, in inferior  sternal lesion with an associated soft tissue mass measuring 5.0 x 5.5  cm previously measured 2.8 x 3.6 cm and a left iliac lesion  with  associated endplate by 5.1 cm soft tissue mass previously measured 2.1  x 4.4 cm. A 5.9 x 6.5 cm mass in the left psoas previously measured  2.2 x 3.0 cm. There is worsened posterior epidural extension of the  lesion at T3, resulting in at least moderate spinal canal narrowing.      Impression    IMPRESSION:   1. Progressive metastatic disease as evidenced by enlarging lung,  liver, adrenal, skeletal, and soft tissue masses.  2. New pathologic fracture at T5 with 25-50% loss of vertebral body  height.  3. Worsening posterior epidural extension of the metastatic lesion at  T3, resulting in at least moderate spinal canal narrowing.    I have personally reviewed the examination and initial interpretation  and I agree with the findings.    KHURRAM GRANT MD       ECOG performance status 1.      ASSESSMENT:   A 41-year-old gentleman with stage IV adenocarcinoma of the lung, initially presented with a left frontal brain mass which was resected and had radiation, eventually was diagnosed with bone metastases after 2 cycles of adjuvant cisplatin and pemetrexed and has had 3 cycles of cisplatin and pemetrexed and was started on ometa as well.   He is on [id number] trial which is testing nivolumab in combination with an IL-15 super agonist.    PET scan was performed 12/23/16 showed progression at multiple sites. He had palliative radiation to painful bone metastasis.   He was screened for MIRATI and STARTRK study as well. He was not a candidate for Mirati and very less likely to be candidate for STARTRK.  PET scan and images from 2/6/17- progression at multiple sites.  - He was on palliative intent taxotere.   - CT scan 3/20/17 results were reviewed with the patient today- progression at multiple sites including new vertebral fracture. His recent MRI has shown new brain metastasis and he has seen radiation oncology and neurosurgery and is about to start gamma knife. I think we need to change treatment. i would recommend to  switch chemotherapy to combination of gemcitabine and vinorelbine. I had a discussion about the potential side effects, risks and benefits and he is willing to proceed. I will also refer him to spine surgery for pathologic fracture of his spine.      PLAN:  1- Discontinue Taxotere  2.  Chemo education- Gemcitabine and Vinorelbine. Start chemotherapy this week.  3.  Change to dexamethasone 2 mg BID  4.  Schedule an appointment with spine surgery for T3/T5 metastasis  5-  RTC MD 3 weeks with CBC diff, CMP  6-  Proceed with gamma knife as planned  EZEQUIEL MORGAN MD    3/21/2017

## 2017-03-21 NOTE — PROGRESS NOTES
"[facility] PHYSICIANS  HEMATOLOGY ONCOLOGY    ONCOLOGY FOLLOWUP NOTE      DIAGNOSIS:  Stage IV non-small cell lung cancer.  Initially presented in 07/2016 with a brain mass, which was resected and was consistent with metastatic adenocarcinoma of the lung.  Negative EGFR and ALK rearrangement studies. CT scan in 07/2016 did not have any definitive mass in the lung.  PET/CT 08/2015 showed 1 cm mass in the left lower lobe.  [facility].       TREATMENT:  He proceeded with cisplatin and Alimta for 6 cycles after stereotactic radiation to resection bed and started chemotherapy 09/19/2016.  Imaging after 2 cycles of chemotherapy were consistent with metastatic disease in the bone.  He was started on Zometa and has had 3 cycles of adjuvant cisplatin and Alimta.     [id number] with Nivolumab first He proceeded with cisplatin and Alimta for 6 cycles after stereotactic radiation to resection bed and started chemotherapy 09/19/2016.  Imaging after 2 cycles of chemotherapy were consistent with metastatic disease in the bone.  He was started on Zometa and has had 3 cycles of adjuvant cisplatin and Alimta.treatment 11/23/16. Progression on scan 2/6/17.  2/7/2017 Txotere, completed 2 cycles.   3/21/2017 Vinorelbine/gemcitabine     SUBJECTIVE:  The patient was seen as a followup today. He has returned from a vacation. He has lower back pain but no pain in the upper back area. He does not have any new neurologic symptoms.     REVIEW OF SYSTEMS:  A complete review of systems was performed and found to be negative other than pertinent positives mentioned in history of present illness.     Past medical, social histories reviewed.    Meds- Reviewed.     PHYSICAL EXAMINATION:   VITAL SIGNS: BP (!) 135/95 (BP Location: Right arm, Patient Position: Chair, Cuff Size: Adult Regular)  Pulse 115  Temp 97.5  F (36.4  C) (Oral)  Resp 20  Ht 1.88 m (6' 2\")  Wt 104.4 kg (230 lb 3.2 oz)  " SpO2 97%  BMI 29.56 kg/m2  GENERAL: Sitting comfortably.   HEENT: Pupils are equal. Oropharynx is clear.   NECK: Right cervical adenopathy is stable.   LUNGS: Clear bilaterally.   HEART: S1, S2, regular.   ABDOMEN: Soft, nontender, nondistended, no hepatosplenomegaly.   EXTREMITIES: Warm, well perfused.   NEUROLOGIC: Alert, awake.   SKIN:No rash  LYMPHATICS: No edema.      LABORATORY DATA:    Recent Labs   Lab Test  03/02/17   0921  01/31/17   0957  01/24/17   1321   NA   --   134  134   POTASSIUM   --   4.2  4.4   CHLORIDE   --   100  100   CO2   --   26  26   ANIONGAP   --   8  8   BUN   --   13  16   CR  0.63*  0.66  0.66   GLC   --   95  61*   ROSENDO  9.0  9.1  8.9   MAG   --   1.9  2.3   PHOS   --   3.9  4.0     Recent Labs   Lab Test  03/02/17   0921  02/09/17   1006  01/31/17   0957   WBC  7.3  7.4  4.4   HGB  8.5*  9.9*  9.0*   PLT  395  352  201   MCV  81  84  84   NEUTROPHIL  83.4  84.4  73.0     Recent Labs   Lab Test  03/02/17   0921  02/09/17   1006  01/31/17   0957  01/24/17   1321  01/17/17   0830   BILITOTAL  0.3  0.2  0.3  0.3  0.3   ALKPHOS  69  66  58  60  61   ALT  13  18  17  17  18   AST  20  22  20  22  21   ALBUMIN  3.0*  3.3*  3.0*  3.1*  3.2*   LDH   --    --   402*  489*  407*         Results for orders placed or performed in visit on 03/20/17   CT Chest/Abdomen/Pelvis w Contrast    Narrative    EXAMINATION: CT CHEST/ABDOMEN/PELVIS W CONTRAST, 3/20/2017 10:03 AM    TECHNIQUE:  Helical CT images from the thoracic inlet through the  symphysis pubis were obtained with contrast. Contrast dose: 135 mL of  Isovue-370    COMPARISON: PET/CT, 2/6/2017, 12/23/2016, and 11/10/2016 and outside  CT CAP, 7/28/2016    HISTORY: Metastatic non-small cell lung cancer on chemotherapy    FINDINGS:    Chest: A 2.6 x 3.5 cm left lower lobe mass (series 6, image 97)  previously measured 2.4 x 3.4 cm when measured in the same planes. No  new or enlarging pulmonary masses are identified. Calcified  granulomata are  unchanged. The heart size is within normal limits. No  pericardial or pleural effusion. No pneumothorax. Enlarged left hilar  lymph nodes, the largest measuring 1.7 cm on series 3, image 173, are  unchanged. No new lymphadenopathy. The visualized thyroid is  unremarkable. The great vessels of the chest are within normal limits.  The airway is patent. No bronchial wall thickening or bronchiectasis.    Abdomen and pelvis: A 2.0 x 2.2 cm hypoenhancing segment 8 lesion  (series 3, image 229) previously measured 0.8 x 1.3 cm. Ill-defined  1.6 x 1.7 cm segment 6 lesion (series 3, image 275) and a 1.1 x 1.3 cm  lesion in the caudate (series 3, image 283) were not visualized  previously. Bilateral adrenal masses are increased in size, from 2.7 x  4.7 cm to 4.5 x 5.7 cm on the right and from 2.2 x 3.2 cm to 4.3 x 4.2  cm on the left. A 7 mm retroperitoneal lymph node (series 3, image  370) and a 6 mm omental nodule (series 3, image 446) are new since the  previous study.    The spleen, pancreas, and kidneys are within normal limits. The  stomach and small bowel are within normal limits. A few colonic  diverticula are unchanged. There is no wall thickening or pericolic  stranding to suggest diverticulitis. Normal appendix. The major  abdominal vessels are patent and normal in caliber.    Bones and soft tissues: There is new 25 is 50% loss of vertebral body  height at T5 without associated retropulsion of the vertebral body.  Innumerable lytic lesions, some of which are associated with soft  tissue masses, worsened from previous. For example, in inferior  sternal lesion with an associated soft tissue mass measuring 5.0 x 5.5  cm previously measured 2.8 x 3.6 cm and a left iliac lesion with  associated endplate by 5.1 cm soft tissue mass previously measured 2.1  x 4.4 cm. A 5.9 x 6.5 cm mass in the left psoas previously measured  2.2 x 3.0 cm. There is worsened posterior epidural extension of the  lesion at T3, resulting in at  least moderate spinal canal narrowing.      Impression    IMPRESSION:   1. Progressive metastatic disease as evidenced by enlarging lung,  liver, adrenal, skeletal, and soft tissue masses.  2. New pathologic fracture at T5 with 25-50% loss of vertebral body  height.  3. Worsening posterior epidural extension of the metastatic lesion at  T3, resulting in at least moderate spinal canal narrowing.    I have personally reviewed the examination and initial interpretation  and I agree with the findings.    KHURRAM GRANT MD       ECOG performance status 1.      ASSESSMENT:   A 41-year-old gentleman with stage IV adenocarcinoma of the lung, initially presented with a left frontal brain mass which was resected and had radiation, eventually was diagnosed with bone metastases after 2 cycles of adjuvant cisplatin and pemetrexed and has had 3 cycles of cisplatin and pemetrexed and was started on ometa as well.   He is on [id number] trial which is testing nivolumab in combination with an IL-15 super agonist.    PET scan was performed 12/23/16 showed progression at multiple sites. He had palliative radiation to painful bone metastasis.   He was screened for MIRATI and STARTRK study as well. He was not a candidate for Mirati and very less likely to be candidate for STARTRK.  PET scan and images from 2/6/17- progression at multiple sites.  - He was on palliative intent taxotere.   - CT scan 3/20/17 results were reviewed with the patient today- progression at multiple sites including new vertebral fracture. His recent MRI has shown new brain metastasis and he has seen radiation oncology and neurosurgery and is about to start gamma knife. I think we need to change treatment. i would recommend to switch chemotherapy to combination of gemcitabine and vinorelbine. I had a discussion about the potential side effects, risks and benefits and he is willing to proceed. I will also refer him to spine surgery for pathologic fracture of his spine.       PLAN:  1- Discontinue Taxotere  2.  Chemo education- Gemcitabine and Vinorelbine. Start chemotherapy this week.  3.  Change to dexamethasone 2 mg BID  4.  Schedule an appointment with spine surgery for T3/T5 metastasis  5-  RTC MD 3 weeks with CBC diff, CMP  6-  Proceed with gamma knife as planned  EZEQUIEL MORGAN MD    3/21/2017

## 2017-03-23 NOTE — MR AVS SNAPSHOT
After Visit Summary   3/23/2017    Naren Kimble    MRN: [id number]           Patient Information     Date Of Birth          1975        Visit Information        Provider Department      3/23/2017 9:00 AM  25 ATC;  ONCOLOGY INFUSION [facility]        Today's Diagnoses     Non-small cell lung cancer (NSCLC) (H)    -  1      Care Instructions    Contact Numbers    [facility] [place] [phone]  [facility] [place]:  [phone]    Call triage with chills and/or temperature greater than or equal to 100.5, uncontrolled nausea/vomiting, diarrhea, constipation, dizziness, shortness of breath, chest pain, bleeding, unexplained bruising, or any new/concerning symptoms, questions/concerns.     If you are having any concerning symptoms or wish to speak to a provider before your next infusion visit, please call your care coordinator or triage to notify them so we can adequately serve you.       After Hours: [phone]    If after hours, weekends, or holidays, call main hospital  and ask for Oncology doctor on call.           March 2017 Sunday Monday Tuesday Wednesday Thursday Friday Saturday                  1     2     [facility] LAB DRAW    9:00 AM   (15 min.)   [facility] LAB DRAW   [facility] Lab Draw     [facility] RETURN    9:15 AM   (50 min.)   Yulia Lepe, APRN CNP   [facility] ONC INFUSION 120   11:00 AM   (120 min.)    ONCOLOGY INFUSION   [facility] 3     4       5     6     MR BRAIN W   11:00 AM   (60 min.)   UUMR1   [facility] [place], MRI 7     8     9     10     11       12     13     14     15     16     17     18       19     20     CT CHEST/ABDOMEN/PELVIS W    9:25 AM   (20 min.)   UCCT2   [facility] Imaging [place] [facility]     [facility] GAMMA KNIFE CONSULT   12:00 PM   (90 min.)   Elian Fraser MD   [facility], [place], Radiation Oncology     [facility] NEW    3:00 PM   (30 min.)   John Frederick MD   [facility]  Clinic 21     [facility] LAB DRAW    8:30 AM   (15 min.)    [facility] LAB DRAW   [facility] Lab Draw     [facility] RETURN    8:45 AM   (30 min.)   Nenita Bartholomew MD   [facility] 22     23     [facility] ONC INFUSION 60    9:00 AM   (60 min.)    ONCOLOGY INFUSION   [facility] 24     25       26     27     28     [facility] GAMMA TREATMENT    5:15 AM   (60 min.)   Elian Fraser MD   [facility], Radiation Oncology     [facility] GAMMA TREATMENT    6:30 AM   (60 min.)   John Frederick MD   [facility], Radiation Oncology     MR BRAIN W    7:00 AM   (30 min.)   [address], MRI     MR THORACIC SPINE WO    8:00 AM   (60 min.)   [address], MRI 29     30     31 April 2017 Sunday Monday Tuesday Wednesday Thursday Friday Saturday                                 1       2     3     4     5     6     7     8       9     10     11     [facility] LAB DRAW    8:00 AM   (15 min.)    [facility] LAB DRAW   [facility] Lab Draw     [facility] RETURN    8:15 AM   (30 min.)   Nenita Bartholomew MD   [facility] 12     13     [facility] LAB DRAW    9:00 AM   (15 min.)    [facility] LAB DRAW   [facility] Lab Draw     [facility] ONC INFUSION 60    9:30 AM   (60 min.)    ONCOLOGY INFUSION   [facility] 14     15       16     17     18     19     20     21     22       23     24     25     26     27     28     29       30                                             No results found for this or any previous visit (from the past 24 hour(s)).          Follow-ups after your visit        Your next 10 appointments already scheduled     Mar 28, 2017  5:15 AM CDT   GAMMA TREATMENT with Elian Fraser MD   [facility], [place], Radiation Oncology ([facility])    [address] [address]   [phone]            Mar 28, 2017  6:30 AM CDT   GAMMA TREATMENT with John Frederick MD   [facility], Radiation Oncology ([facility]  [facility])    [address] [facility] [address]   [phone]            Mar 28, 2017  7:00 AM CDT   MR BRAIN W CONTRAST with UUMR1   [facility] [place], MRI ([facility], [facility])    [address] [address]   [phone]           Take your medicines as usual, unless your doctor tells you not to. Bring a list of your current medicines to your exam (including vitamins, minerals and over-the-counter drugs).  You will be given intravenous contrast for this exam. To prepare:   The day before your exam, drink extra fluids at least six 8-ounce glasses (unless your doctor tells you to restrict your fluids).   Have a blood test (creatinine test) within 30 days of your exam. Go to your clinic or Diagnostic Imaging Department for this test.  The MRI machine uses a strong magnet. Please wear clothes without metal (snaps, zippers). A sweatsuit works well, or we may give you a hospital gown.  Please remove any body piercings and hair extensions before you arrive. You will also remove watches, jewelry, hairpins, wallets, dentures, partial dental plates and hearing aids. You may wear contact lenses, and you may be able to wear your rings. We have a safe place to keep your personal items, but it is safer to leave them at home.   **IMPORTANT** THE INSTRUCTIONS BELOW ARE ONLY FOR THOSE PATIENTS WHO HAVE BEEN TOLD THEY WILL RECEIVE SEDATION OR GENERAL ANESTHESIA DURING THEIR MRI PROCEDURE:  IF YOU WILL RECEIVE SEDATION (take medicine to help you relax during your exam):   You must get the medicine from your doctor before you arrive. Bring the medicine to the exam. Do not take it at home.   Arrive one hour early. Bring someone who can take you home after the test. Your medicine will make you sleepy. After the exam, you may not drive, take a bus or take a taxi by yourself.   No eating 8 hours before your exam. You may have clear liquids up until 4 hours before your  exam. (Clear liquids include water, clear tea, black coffee and fruit juice without pulp.)  IF YOU WILL RECEIVE ANESTHESIA (be asleep for your exam):   Arrive 1 1/2 hours early. Bring someone who can take you home after the test. You may not drive, take a bus or take a taxi by yourself.   No eating 8 hours before your exam. You may have clear liquids up until 4 hours before your exam. (Clear liquids include water, clear tea, black coffee and fruit juice without pulp.)  Please call the Imaging Department at your exam site with any questions.            Mar 28, 2017  8:00 AM CDT   MR THORACIC SPINE W/O CONTRAST with UUMR1   [facility] [place], [facility] ([facility], [facility])    [address] [address] [phone]           Take your medicines as usual, unless your doctor tells you not to. Bring a list of your current medicines to your exam (including vitamins, minerals and over-the-counter drugs). Also bring the results of similar scans you may have had.  Please remove any body piercings and hair extensions before you arrive.  Follow your doctor s orders. If you do not, we may have to postpone your exam.  You will not have contrast for this exam. You do not need to do anything special to prepare.  The MRI machine uses a strong magnet. Please wear clothes without metal (snaps, zippers). A sweatsuit works well, or we may give you a hospital gown.   **IMPORTANT** THE INSTRUCTIONS BELOW ARE ONLY FOR THOSE PATIENTS WHO HAVE BEEN TOLD THEY WILL RECEIVE SEDATION OR GENERAL ANESTHESIA DURING THEIR MRI PROCEDURE:  IF YOU WILL RECEIVE SEDATION (take medicine to help you relax during your exam):   You must get the medicine from your doctor before you arrive. Bring the medicine to the exam. Do not take it at home.   Arrive one hour early. Bring someone who can take you home after the test. Your medicine will make you sleepy. After the exam, you may not drive, take a bus  or take a taxi by yourself.   No eating 8 hours before your exam. You may have clear liquids up until 4 hours before your exam. (Clear liquids include water, clear tea, black coffee and fruit juice without pulp.)  IF YOU WILL RECEIVE ANESTHESIA (be asleep for your exam):   Arrive 1 1/2 hours early. Bring someone who can take you home after the test. You may not drive, take a bus or take a taxi by yourself.   No eating 8 hours before your exam. You may have clear liquids up until 4 hours before your exam. (Clear liquids include water, clear tea, black coffee and fruit juice without pulp.)   You will spend four to five hours in the recovery room.  Please call the Imaging Department at your exam site with any questions.            Apr 11, 2017  8:00 AM CDT   [facility] Lab Draw with  [facility] LAB DRAW   [facility] ([facility])    [address] [place] [address]   [phone]            Apr 11, 2017  8:30 AM CDT   (Arrive by 8:15 AM)   Return Visit with Nenita Bartholomew MD   [facility] Cancer [facility] ([facility])    [address] [place] [address]   [phone]            Apr 13, 2017  9:00 AM CDT   [facility] Lab Draw with  [facility] LAB DRAW   [facility] ([facility])    [address] [place] [address]   [phone]            Apr 13, 2017  9:30 AM CDT   Infusion 60 with UC ONCOLOGY INFUSION, UC 22 ATC   [facility] ([facility])    [address] [place] [address]   [phone]            May 04, 2017  9:00 AM CDT   [facility] Lab Draw with UC [facility] LAB DRAW   [facility] ([facility])    [address] [place] [address]   [phone]              Who to contact     If you have questions or need follow up  information about today's clinic visit or your schedule please contact [facility] CANCER CLINIC directly at [phone].  Normal or non-critical lab and imaging results will be communicated to you by Wortalhart, letter or phone within 4 business days after the clinic has received the results. If you do not hear from us within 7 days, please contact the clinic through Wortalhart or phone. If you have a critical or abnormal lab result, we will notify you by phone as soon as possible.  Submit refill requests through Joroto or call your pharmacy and they will forward the refill request to us. Please allow 3 business days for your refill to be completed.          Additional Information About Your Visit        [facility] Information     Joroto gives you secure access to your electronic health record. If you see a primary care provider, you can also send messages to your care team and make appointments. If you have questions, please call your primary care clinic.  If you do not have a primary care provider, please call [phone] and they will assist you.        Care EveryWhere ID     This is your Care EveryWhere ID. This could be used by other organizations to access your [facility] medical records  [id number]        Your Vitals Were     Pulse Temperature Respirations Pulse Oximetry BMI (Body Mass Index)       131 96.1  F (35.6  C) (Oral) 16 99% 29.53 kg/m2        Blood Pressure from Last 3 Encounters:   03/23/17 139/82   03/21/17 (!) 135/95   03/21/17 (!) 135/95    Weight from Last 3 Encounters:   03/23/17 104.3 kg (230 lb)   03/21/17 104.4 kg (230 lb 3.2 oz)   03/21/17 104.9 kg (231 lb 3.2 oz)              We Performed the Following     Treatment Conditions          Today's Medication Changes          These changes are accurate as of: 3/23/17 11:46 AM.  If you have any questions, ask your nurse or doctor.               Start taking these medicines.        Dose/Directions    LORazepam 0.5 MG tablet   Commonly known as:   ATIVAN   Used for:  Non-small cell lung cancer (NSCLC) (H)        Dose:  0.5 mg   Take 1 tablet (0.5 mg) by mouth every 4 hours as needed (Anxiety, Nausea/Vomiting or Sleep)   Quantity:  30 tablet   Refills:  2         These medicines have changed or have updated prescriptions.        Dose/Directions    dexamethasone 4 MG tablet   Commonly known as:  DECADRON   This may have changed:  how much to take   Used for:  Non-small cell lung cancer, unspecified laterality (H)        Dose:  4 mg   Take 1 tablet (4 mg) by mouth 2 times daily (with meals)   Quantity:  60 tablet   Refills:  0       * prochlorperazine 10 MG tablet   Commonly known as:  COMPAZINE   This may have changed:  Another medication with the same name was added. Make sure you understand how and when to take each.   Used for:  Non-small cell lung cancer (NSCLC) (H), Non-small cell lung cancer, unspecified laterality (H)        Dose:  10 mg   Take 1 tablet (10 mg) by mouth every 6 hours as needed (Nausea/Vomiting)   Quantity:  30 tablet   Refills:  5       * prochlorperazine 10 MG tablet   Commonly known as:  COMPAZINE   This may have changed:  You were already taking a medication with the same name, and this prescription was added. Make sure you understand how and when to take each.   Used for:  Non-small cell lung cancer (NSCLC) (H)        Dose:  10 mg   Take 1 tablet (10 mg) by mouth every 6 hours as needed (Nausea/Vomiting)   Quantity:  30 tablet   Refills:  2       * Notice:  This list has 2 medication(s) that are the same as other medications prescribed for you. Read the directions carefully, and ask your doctor or other care provider to review them with you.         Where to get your medicines      These medications were sent to [place], [place] - [address] [facility] [address]  [address] [place] [address] [facility] [address]    Hours:  TRANSPLANT PHONE NUMBER [phone] Phone:  [phone]     prochlorperazine  10 MG tablet         Some of these will need a paper prescription and others can be bought over the counter.  Ask your nurse if you have questions.     Bring a paper prescription for each of these medications     LORazepam 0.5 MG tablet                Primary Care Provider    None Specified       No primary provider on file.        Thank you!     Thank you for choosing [facility]  for your care. Our goal is always to provide you with excellent care. Hearing back from our patients is one way we can continue to improve our services. Please take a few minutes to complete the written survey that you may receive in the mail after your visit with us. Thank you!             Your Updated Medication List - Protect others around you: Learn how to safely use, store and throw away your medicines at www.disposemymeds.org.          This list is accurate as of: 3/23/17 11:46 AM.  Always use your most recent med list.                   Brand Name Dispense Instructions for use    albuterol 108 (90 BASE) MCG/ACT Inhaler   Generic drug:  albuterol      Inhale 1-2 puffs into the lungs daily as needed       cyclobenzaprine 10 MG tablet    FLEXERIL    90 tablet    Take 1 tablet (10 mg) by mouth 3 times daily as needed for muscle spasms       dexamethasone 4 MG tablet    DECADRON    60 tablet    Take 1 tablet (4 mg) by mouth 2 times daily (with meals)       fentaNYL 50 mcg/hr 72 hr patch    DURAGESIC    10 patch    Place 1 patch onto the skin every 72 hours       FOLIC ACID PO      Take 800 mcg by mouth daily       IBUPROFEN PO      Take 1 tablet by mouth every 8 hours as needed       levETIRAcetam 100 MG/ML solution    KEPPRA     Take 10 mg/kg by mouth 2 times daily       lidocaine 5 % Patch    LIDODERM    30 patch    Apply up to 3 patches to painful area at once for up to 12 h within a 24 h period.  Remove after 12 hours.       LORazepam 0.5 MG tablet    ATIVAN    30 tablet    Take 1 tablet (0.5 mg) by mouth every 4  hours as needed (Anxiety, Nausea/Vomiting or Sleep)       magnesium hydroxide 2400 MG/10ML Susp    MOM     Take 5 mLs by mouth daily as needed for constipation Reported on 3/2/2017       ondansetron 8 MG tablet    ZOFRAN    90 tablet    Take 1 tablet (8 mg) by mouth every 8 hours as needed       OSCAL 500/200 D-3 PO          * oxyCODONE 10 MG 12 hr tablet    OxyCONTIN    30 tablet    Take 1 tablet (10 mg) by mouth At Bedtime maximum 1tablet(s) per day       * oxyCODONE 5 MG capsule    OXY-IR    200 capsule    Take 1-3 capsules (5-15 mg) by mouth every 4 hours as needed for moderate to severe pain       pantoprazole 40 MG EC tablet    PROTONIX    30 tablet    Take 1 tablet (40 mg) by mouth daily       * prochlorperazine 10 MG tablet    COMPAZINE    30 tablet    Take 1 tablet (10 mg) by mouth every 6 hours as needed (Nausea/Vomiting)       * prochlorperazine 10 MG tablet    COMPAZINE    30 tablet    Take 1 tablet (10 mg) by mouth every 6 hours as needed (Nausea/Vomiting)       SENNA CO          TYLENOL PO      Take 500 mg by mouth daily as needed       * Notice:  This list has 4 medication(s) that are the same as other medications prescribed for you. Read the directions carefully, and ask your doctor or other care provider to review them with you.

## 2017-03-23 NOTE — PROGRESS NOTES
Infusion Nursing Note:    Patient presents today for Cycle 1 Day 1 Navelbine, Gemzar.  Arrived with spouse.  Patient met with Dr. Bartholomew 3/21 prior to infusion.    Lab Results   Component Value Date    HGB 9.8 03/21/2017     Lab Results   Component Value Date    WBC 20.1 03/21/2017      Lab Results   Component Value Date    ANEU 16.7 03/21/2017     Lab Results   Component Value Date     03/21/2017      Lab Results   Component Value Date     03/21/2017                   Lab Results   Component Value Date    POTASSIUM 4.8 03/21/2017           Lab Results   Component Value Date    MAG 1.9 01/31/2017            Lab Results   Component Value Date    CR 0.63 03/21/2017                   Lab Results   Component Value Date    ROSENDO 9.0 03/21/2017                Lab Results   Component Value Date    BILITOTAL 0.3 03/21/2017           Lab Results   Component Value Date    ALBUMIN 2.9 03/21/2017                    Lab Results   Component Value Date    ALT 32 03/21/2017           Lab Results   Component Value Date    AST 31 03/21/2017       Note: Pt starting new regimen today. Pt has written information, verbally reviewed chemo and side effects.    Intravenous Access:  Peripheral IV placed per vascular access.    Post Infusion Assessment:  Patient tolerated infusion without incident.  Blood return noted pre and post infusion.  Blood return noted during administration every 2 cc during navelbine administration.  Site patent and intact, free from redness, edema or discomfort.  No evidence of extravasations.  Access discontinued per protocol.    Discharge Plan:   Prescription refills given for compazine and ativan.  Discharge instructions reviewed with: Patient.  Copy of AVS reviewed with patient and/or family.  Patient will return 3/30 for next appointment.  Patient discharged in stable condition accompanied by: wife.  Departure Mode: Ambulatory.

## 2017-03-23 NOTE — PATIENT INSTRUCTIONS
Contact Numbers    [facility] Main Line: [phone]  [facility] Triage:  [phone]    Call triage with chills and/or temperature greater than or equal to 100.5, uncontrolled nausea/vomiting, diarrhea, constipation, dizziness, shortness of breath, chest pain, bleeding, unexplained bruising, or any new/concerning symptoms, questions/concerns.     If you are having any concerning symptoms or wish to speak to a provider before your next infusion visit, please call your care coordinator or triage to notify them so we can adequately serve you.       After Hours: [phone]    If after hours, weekends, or holidays, call main hospital  and ask for Oncology doctor on call.           March 2017 Sunday Monday Tuesday Wednesday Thursday Friday Saturday                  1     2     [facility] LAB DRAW    9:00 AM   (15 min.)   [facility] LAB DRAW   [facility] Lab Draw     [facility] RETURN    9:15 AM   (50 min.)   Yulia Lepe APRN CNP   [facility] ONC INFUSION 120   11:00 AM   (120 min.)    ONCOLOGY INFUSION   [facility] 3     4       5     6     MR BRAIN W   11:00 AM   (60 min.)   UUMR1   [facility] [place], MRI 7     8     9     10     11       12     13     14     15     16     17     18       19     20     CT CHEST/ABDOMEN/PELVIS W    9:25 AM   (20 min.)   UCCT2   [facility] CT     [facility] GAMMA KNIFE CONSULT   12:00 PM   (90 min.)   Elian Fraser MD   [facility] [place], Radiation Oncology     [facility] NEW    3:00 PM   (30 min.)   John Frederick MD   [facility] 21     [facility] LAB DRAW    8:30 AM   (15 min.)    [facility] LAB DRAW   [facility] Lab Draw     [facility] RETURN    8:45 AM   (30 min.)   Nenita Bartholomew MD   [facility] 22     23     [facility] ONC INFUSION 60    9:00 AM   (60 min.)    ONCOLOGY INFUSION   [facility] 24     25       26     27     28     [facility] GAMMA TREATMENT    5:15 AM   (60 min.)    Elian Fraser MD   [facility], Radiation Oncology     [facility] GAMMA TREATMENT    6:30 AM   (60 min.)   John Frederick MD   [facility], Radiation Oncology     MR BRAIN W    7:00 AM   (30 min.)   [address], MRI     MR THORACIC SPINE WO    8:00 AM   (60 min.)   [address], MRI 29     30     31 April 2017 Sunday Monday Tuesday Wednesday Thursday Friday Saturday                                 1       2     3     4     5     6     7     8       9     10     11     [facility] LAB DRAW    8:00 AM   (15 min.)    [facility]   [facility]     [facility] RETURN    8:15 AM   (30 min.)   Nenita Bartholomew MD   [facility] 12     13     [facility] LAB DRAW    9:00 AM   (15 min.)    [facility] LAB DRAW   [facility] Lab Draw     [facility] ONC INFUSION 60    9:30 AM   (60 min.)    ONCOLOGY INFUSION   [facility] 14     15       16     17     18     19     20     21     22       23     24     25     26     27     28     29       30                                             No results found for this or any previous visit (from the past 24 hour(s)).

## 2017-03-27 NOTE — NURSING NOTE
Met with Valerio and his wife, to discuss chemotherapy change to Gemcitabine and Vinorelbine.  Provided patient [employer] [place] [employer] printouts.  Reviewed administration, side effects and ongoing symptom support management by APPs in clinic. Provided phone numbers for triage and after hours care. Patient stated understanding.     Also offered the patient and his wife to meet with a palliative care  to help with coping for them and their children.  Pt declines at this time.

## 2017-03-28 NOTE — MR AVS SNAPSHOT
After Visit Summary   3/28/2017    Naren Kimble    MRN: [id number]           Patient Information     Date Of Birth          1975        Visit Information        Provider Department      3/28/2017 5:15 AM Elian Fraser MD [facility], [place], Radiation Oncology        Today's Diagnoses     Metastasis to brain (H)    -  1       Follow-ups after your visit        Your next 10 appointments already scheduled     Apr 07, 2017  4:00 PM CDT   MR LUMBAR SPINE W/O & W CONTRAST with UUMR2   [facility], [place], MRI ([facility], [facility])    [address] [facility] [address] [phone]           Take your medicines as usual, unless your doctor tells you not to. Bring a list of your current medicines to your exam (including vitamins, minerals and over-the-counter drugs).  You will be given intravenous contrast for this exam. To prepare:   The day before your exam, drink extra fluids at least six 8-ounce glasses (unless your doctor tells you to restrict your fluids).   Have a blood test (creatinine test) within 30 days of your exam. Go to your clinic or Diagnostic Imaging Department for this test.  The MRI machine uses a strong magnet. Please wear clothes without metal (snaps, zippers). A sweatsuit works well, or we may give you a hospital gown.  Please remove any body piercings and hair extensions before you arrive. You will also remove watches, jewelry, hairpins, wallets, dentures, partial dental plates and hearing aids. You may wear contact lenses, and you may be able to wear your rings. We have a safe place to keep your personal items, but it is safer to leave them at home.   **IMPORTANT** THE INSTRUCTIONS BELOW ARE ONLY FOR THOSE PATIENTS WHO HAVE BEEN TOLD THEY WILL RECEIVE SEDATION OR GENERAL ANESTHESIA DURING THEIR MRI PROCEDURE:  IF YOU WILL RECEIVE SEDATION (take medicine to help you relax during your exam):   You must get the medicine from  your doctor before you arrive. Bring the medicine to the exam. Do not take it at home.   Arrive one hour early. Bring someone who can take you home after the test. Your medicine will make you sleepy. After the exam, you may not drive, take a bus or take a taxi by yourself.   No eating 8 hours before your exam. You may have clear liquids up until 4 hours before your exam. (Clear liquids include water, clear tea, black coffee and fruit juice without pulp.)  IF YOU WILL RECEIVE ANESTHESIA (be asleep for your exam):   Arrive 1 1/2 hours early. Bring someone who can take you home after the test. You may not drive, take a bus or take a taxi by yourself.   No eating 8 hours before your exam. You may have clear liquids up until 4 hours before your exam. (Clear liquids include water, clear tea, black coffee and fruit juice without pulp.)  Please call the Imaging Department at your exam site with any questions.            Apr 07, 2017  5:00 PM CDT   MR THORACIC SPINE W/O CONTRAST with UUMR2   [facility], [place], [facility] ([facility], [facility])    [address] [address] [phone]           Take your medicines as usual, unless your doctor tells you not to. Bring a list of your current medicines to your exam (including vitamins, minerals and over-the-counter drugs). Also bring the results of similar scans you may have had.  Please remove any body piercings and hair extensions before you arrive.  Follow your doctor s orders. If you do not, we may have to postpone your exam.  You will not have contrast for this exam. You do not need to do anything special to prepare.  The MRI machine uses a strong magnet. Please wear clothes without metal (snaps, zippers). A sweatsuit works well, or we may give you a hospital gown.   **IMPORTANT** THE INSTRUCTIONS BELOW ARE ONLY FOR THOSE PATIENTS WHO HAVE BEEN TOLD THEY WILL RECEIVE SEDATION OR GENERAL ANESTHESIA DURING THEIR MRI  PROCEDURE:  IF YOU WILL RECEIVE SEDATION (take medicine to help you relax during your exam):   You must get the medicine from your doctor before you arrive. Bring the medicine to the exam. Do not take it at home.   Arrive one hour early. Bring someone who can take you home after the test. Your medicine will make you sleepy. After the exam, you may not drive, take a bus or take a taxi by yourself.   No eating 8 hours before your exam. You may have clear liquids up until 4 hours before your exam. (Clear liquids include water, clear tea, black coffee and fruit juice without pulp.)  IF YOU WILL RECEIVE ANESTHESIA (be asleep for your exam):   Arrive 1 1/2 hours early. Bring someone who can take you home after the test. You may not drive, take a bus or take a taxi by yourself.   No eating 8 hours before your exam. You may have clear liquids up until 4 hours before your exam. (Clear liquids include water, clear tea, black coffee and fruit juice without pulp.)   You will spend four to five hours in the recovery room.  Please call the Imaging Department at your exam site with any questions.            Apr 11, 2017  8:00 AM CDT   Masonic Lab Draw with  MASONIC LAB DRAW   [facility] ([facility])    [address] [place] [id number]   [phone]            Apr 11, 2017  8:30 AM CDT   (Arrive by 8:15 AM)   Return Visit with Nenita Bartholomew MD   [facility] ([facility])    [address] [place] [address]   [phone]            Apr 13, 2017  9:00 AM CDT   Masonic Lab Draw with UC MASONIC LAB DRAW   [facility] Masonic Lab Draw ([facility])    [address] [address]   [phone]            Apr 13, 2017  9:30 AM CDT   Infusion 60 with  ONCOLOGY INFUSION, UC 22 ATC   [facility] ([facility])     [address] [address] [phone]            May 04, 2017  9:00 AM CDT   Masonic Lab Draw with  MASONIC LAB DRAW   [facility] Lab Draw ([facility])    [address] [id number] [phone]            May 04, 2017  9:30 AM CDT   Infusion 60 with UC ONCOLOGY INFUSION, UC 22 ATC   [facility] ([facility])    [address] [place] [id number] [phone]              Future tests that were ordered for you today     Open Future Orders        Priority Expected Expires Ordered    MRI Lumbar spine w & w/o contrast Routine  4/6/2018 4/6/2017            Who to contact     Please call your clinic at [phone] to:    Ask questions about your health    Make or cancel appointments    Discuss your medicines    Learn about your test results    Speak to your doctor   If you have compliments or concerns about an experience at your clinic, or if you wish to file a complaint, please contact [facility] Patient Relations at [phone] or email us at [email or web address]         Additional Information About Your Visit        [email or web address] Information     [email or web address] gives you secure access to your electronic health record. If you see a primary care provider, you can also send messages to your care team and make appointments. If you have questions, please call your primary care clinic.  If you do not have a primary care provider, please call [phone] and they will assist you.      [email or web address] is an electronic gateway that provides easy, online access to your medical records. With [email or web address], you can request a clinic appointment, read your test results, renew a prescription or communicate with your care team.     To access your existing account, please contact your [facility] Clinic or call [phone] for  assistance.        Care EveryWhere ID     This is your Care EveryWhere ID. This could be used by other organizations to access your [facility] medical records  [id number]        Your Vitals Were     Pulse Respirations Pulse Oximetry             119 16 100%          Blood Pressure from Last 3 Encounters:   04/06/17 126/82   03/30/17 133/84   03/28/17 134/88    Weight from Last 3 Encounters:   04/06/17 110.8 kg (244 lb 4.8 oz)   03/30/17 104.1 kg (229 lb 8 oz)   03/23/17 104.3 kg (230 lb)              We Performed the Following     Activity: Up ad peter     IV access: Insert Peripheral IV Catheter (needle no smaller than 22 gauge)     NPO Ice Chips (prior to head frame placement)     Vital Signs and Pain Assessment          Today's Medication Changes          These changes are accurate as of: 3/28/17 11:59 PM.  If you have any questions, ask your nurse or doctor.               These medicines have changed or have updated prescriptions.        Dose/Directions    dexamethasone 4 MG tablet   Commonly known as:  DECADRON   This may have changed:    - how much to take  - when to take this   Used for:  Non-small cell lung cancer, unspecified laterality (H)        Dose:  4 mg   Take 1 tablet (4 mg) by mouth 2 times daily (with meals)   Quantity:  60 tablet   Refills:  0                Primary Care Provider    None Specified       No primary provider on file.        Thank you!     Thank you for choosing [facility], RADIATION ONCOLOGY  for your care. Our goal is always to provide you with excellent care. Hearing back from our patients is one way we can continue to improve our services. Please take a few minutes to complete the written survey that you may receive in the mail after your visit with us. Thank you!             Your Updated Medication List - Protect others around you: Learn how to safely use, store and throw away your medicines at www.disposemymeds.org.          This list is accurate as of: 3/28/17 11:59 PM.   Always use your most recent med list.                   Brand Name Dispense Instructions for use    albuterol 108 (90 BASE) MCG/ACT Inhaler   Generic drug:  albuterol      Inhale 1-2 puffs into the lungs daily as needed       cyclobenzaprine 10 MG tablet    FLEXERIL    90 tablet    Take 1 tablet (10 mg) by mouth 3 times daily as needed for muscle spasms       dexamethasone 4 MG tablet    DECADRON    60 tablet    Take 1 tablet (4 mg) by mouth 2 times daily (with meals)       fentaNYL 50 mcg/hr 72 hr patch    DURAGESIC    10 patch    Place 1 patch onto the skin every 72 hours       FOLIC ACID PO      Take 800 mcg by mouth daily       IBUPROFEN PO      Take 1 tablet by mouth every 8 hours as needed       levETIRAcetam 100 MG/ML solution    KEPPRA     Take 10 mg/kg by mouth 2 times daily       lidocaine 5 % Patch    LIDODERM    30 patch    Apply up to 3 patches to painful area at once for up to 12 h within a 24 h period.  Remove after 12 hours.       LORazepam 0.5 MG tablet    ATIVAN    30 tablet    Take 1 tablet (0.5 mg) by mouth every 4 hours as needed (Anxiety, Nausea/Vomiting or Sleep)       magnesium hydroxide 2400 MG/10ML Susp    MOM     Take 5 mLs by mouth daily as needed for constipation Reported on 3/2/2017       ondansetron 8 MG tablet    ZOFRAN    90 tablet    Take 1 tablet (8 mg) by mouth every 8 hours as needed       OSCAL 500/200 D-3 PO          oxyCODONE 10 MG 12 hr tablet    OxyCONTIN    30 tablet    Take 1 tablet (10 mg) by mouth At Bedtime maximum 1tablet(s) per day       pantoprazole 40 MG EC tablet    PROTONIX    30 tablet    Take 1 tablet (40 mg) by mouth daily       * prochlorperazine 10 MG tablet    COMPAZINE    30 tablet    Take 1 tablet (10 mg) by mouth every 6 hours as needed (Nausea/Vomiting)       * prochlorperazine 10 MG tablet    COMPAZINE    30 tablet    Take 1 tablet (10 mg) by mouth every 6 hours as needed (Nausea/Vomiting)       SENNA CO          TYLENOL PO      Take 500 mg by mouth  daily as needed       * Notice:  This list has 2 medication(s) that are the same as other medications prescribed for you. Read the directions carefully, and ask your doctor or other care provider to review them with you.

## 2017-03-28 NOTE — LETTER
3/28/2017       RE: Naren Kimble  [address]  [place] [facility] [address]     Dear Colleague,    Thank you for referring your patient, Naren Kimble, to the [facility] [place], RADIATION ONCOLOGY. Please see a copy of my visit note below.    See mosaiq note      Again, thank you for allowing me to participate in the care of your patient.      Sincerely,    Elian Fraser MD

## 2017-03-28 NOTE — PROGRESS NOTES
Arrived on 2A, Head frame in place, puncture sites dry and intact. Denies pain. Eating PB toast and taking po fluids. Wife with him. Resting comfortably. Continue to monitor until patient goes for his Gamma Knife treatment.

## 2017-03-29 NOTE — OP NOTE
DATE OF SERVICE:  03/28/2017      PREOPERATIVE DIAGNOSIS:  Metastatic lung cancer.      POSTOPERATIVE DIAGNOSIS:  Metastatic lung cancer.      PROCEDURES:   1.  Placement of Leksell stereotactic head frame.   2.  Stereotactic MRI of the brain.   3.  Generation of stereotactic treatment plan.   4.  Gamma knife treatment.      ATTENDING SURGEON:  John Frederick MD      ASSISTANT:  Olesya Rosas MD ([place])      ANESTHESIA:  Local anesthetic.      INDICATIONS:  Naren Kimble is a 42-year-old male with metastatic lung cancer who was noted to have brain metastasis and was brought for radiosurgical treatment of the lesion.      PROCEDURE:  After obtaining informed consent, the patient was brought to the Gamma Knife suite and correctly identified.  Oral Ativan was administered for head frame placement.  Leksell stereotactic head frame was then secured to the patient's head using 4 pins after the intended pin sites were prepared in the usual sterile fashion and infiltrated with local anesthetic.  Care was taken not to over tighten the pins and therefore avoid torquing the frame.  Accurate and secure placement of the frame was verified.  Skull geometry measurements were obtained and verified and fiducial localizer box was affixed to the head frame.      The patient was then transferred to the MRI scanner and stereotactic MRI of the brain was obtained.  The MRI images were transferred to the gamma knife treatment planning station and 3-dimensional stereotactic treatment plan was generated as follows:  10 targets were identified and outlined 3 dimensionally.  Maximum diameter of any target was 0.9 cm.  We created a treatment plan for each target as identified in the treatment planning protocol.  Skull geometry measurements were obtained and verified.  The patient placed on the gamma knife couch and frame set to the appropriate X, Y and Z coordinates.  X, Y and Z coordinates were verified and the gamma knife treatment  carried out as follows:  Target 1 in the right parietofrontal region was treated to 20 Gy at the 60% isodose line.  A single shot using an 8 mm collimator was used to a treatment volume of 0.48 mL.  Target 2 in the right medial frontal region was treated to 20 Gy at the 50% isodose line.  A single shot using a 4 mm collimator was used to a treatment volume of 0.10 mL.  Target 3 in left posterior frontal region was treated to 20 Gy at the 80% isodose line.  A single shot using a 4 mm collimator was used to treatment volume of 0.17 mL.  Target 4 in the left frontal region was treated to 20 Gy at the 80% isodose line.  A single shot using an 8 mm collimator was used to treatment volume of 0.32 mL.  Target 5 in the left anterior frontal region was treated to 20 Gy at the 80% isodose line.  A single shot using a 4 mm collimator was used to treatment volume of 0.03 mL.  Target 6 in the right anterior temporal region was treated to 20 Gy at the 80% isodose line.  A single shot using an 8 mm collimator was used to treatment volume of 0.29 mL.  Target 7 in the right occipital region was treated to 20 Gy at the 80% isodose line.  A single shot using an 8 mm collimator was used to treatment volume of was 0.30 mL.  Target 8 in the right medial occipital region was treated to 20 Gy at 50% isodose line, 2 shots using a 4 mm collimator were used to treatment volume of 0.10 mL.  Target 9 in the right medial cerebellum was treated to 20 Gy at the 70% isodose line.  A single shot using a 40 mm collimator was used to treatment volume of 0.05 mL.  Target 10 in the right inferior cerebellar region was treated to 20 Gy at the 80% isodose line.  A single shot using a 4 mm collimator was used to treatment volume point of 0.03 mL.  At the completion of treatment, the patient was taken out of the treatment unit and stereotactic head frame removed.  Pin sites were inspected for integrity.  Sterile dressing was applied.  The patient was given  discharge instructions and plan for followup.  No immediate complications were noted.      Dr. Norris was present for the key neurosurgical portions of the procedure and immediately available throughout the entire procedure.         WILLIAM NORRIS MD             D: 2017 18:44   T: 2017 22:17   MT:       Name:     OBDULIA CHIANG   MRN:      [id number]        Account:        [id number]   :      1975           Procedure Date: 2017      Document: [id number]

## 2017-03-29 NOTE — TELEPHONE ENCOUNTER
A call was placed to Naren in follow up to his Gamma Knife treatment on 3/28/17.  Naren said the head wrap is off, he showered and shampooed and the pin sites look just fine.  Naren denied a headache, said he truly is feeling quite good today, no complaints.  Naren said he slept well and is eating.

## 2017-03-30 NOTE — PROGRESS NOTES
"Infusion Nursing Note:  Naren Kimble presents today for Cycle 1, day 8 Navelbine and Gemzar.    Patient seen by provider today: No    Note: Patient presents to clinic today feeling well with no questions.  Patient with c/o \"mild aching\" in forearm about 5-6 inches above PIV insertion site following Navelbine infusion.  BRBR noted prior to, throughout and after infusion.  No swelling, redness or streaks present.  Aching resolved with saline flush and warm compress.  Mild aching returned about 10 minutes into Gemzar infusion, resolved with slowing of infusion to 400cc/hr.  No c/o pain, no swelling or redness and discharge.  Patient will contact triage with any concerns.  Sent message to MD to sign Zometa orders.      Intravenous Access:  Peripheral IV placed by vascular access.    Treatment Conditions:  Lab Results   Component Value Date    HGB 8.6 03/30/2017     Lab Results   Component Value Date    WBC 5.8 03/30/2017      Lab Results   Component Value Date    ANEU 5.1 03/30/2017     Lab Results   Component Value Date     03/30/2017      Results reviewed, labs MET treatment parameters, ok to proceed with treatment.    Post Infusion Assessment:  Patient tolerated infusion.  Blood return noted pre and post infusion.  Site patent and intact, free from redness, edema or discomfort.  No evidence of extravasations.  Access discontinued per protocol.    Discharge Plan:   Prescription refills requested for Oxycodone IR; MD and PA not in clinic.  Discussed with RNCC, sent request to PA and requested RNCC to f/u.  Patient will be retuning to campus on Monday 4/3 for MRI (rescheduled from this afternoon).  Discharge instructions reviewed with: Patient.  Copy of AVS reviewed with patient and/or family.  Patient will return 4/3/2017 for next appointment.  Departure Mode: Ambulatory.  Face to Face time: 15 minutes.    Marlen Valentin RN                        "

## 2017-03-30 NOTE — PATIENT INSTRUCTIONS
Contact Numbers    [facility] Main Line: [phone]  [facility] Triage:  [phone]    Call triage with chills and/or temperature greater than or equal to 100.5, uncontrolled nausea/vomiting, diarrhea, constipation, dizziness, shortness of breath, chest pain, bleeding, unexplained bruising, or any new/concerning symptoms, questions/concerns.     If you are having any concerning symptoms or wish to speak to a provider before your next infusion visit, please call your care coordinator or triage to notify them so we can adequately serve you.       After Hours: [phone]    If after hours, weekends, or holidays, call main hospital  and ask for Oncology doctor on call.         March 2017 Sunday Monday Tuesday Wednesday Thursday Friday Saturday                  1     2     [facility] MASONIC LAB DRAW    9:00 AM   (15 min.)   [facility] DRAW   [facility] Lab Draw     [facility] RETURN    9:15 AM   (50 min.)   Yulia Lepe APRN CNP   [facility] ONC INFUSION 120   11:00 AM   (120 min.)    ONCOLOGY INFUSION   [facility] 3     4       5     6     MR BRAIN W   11:00 AM   (60 min.)   UUMR1   [facility], [place], MRI 7     8     9     10     11       12     13     14     15     16     17     18       19     20     CT CHEST/ABDOMEN/PELVIS W    9:25 AM   (20 min.)   UCCT2   [facility] CT     [facility] GAMMA KNIFE CONSULT   12:00 PM   (90 min.)   Elian Fraser MD   [facility], [place], Radiation Oncology     [facility] NEW    3:00 PM   (30 min.)   John Frederick MD   [facility] 21     [facility] LAB DRAW    8:30 AM   (15 min.)    [facility] LAB DRAW   [facility] Lab Draw     [facility] RETURN    8:45 AM   (30 min.)   Nenita Bartholomew MD   [facility] 22     23     [facility] ONC INFUSION 60    9:00 AM   (60 min.)    ONCOLOGY INFUSION   [facility] 24     25       26     27     28     [facility] GAMMA TREATMENT    5:15 AM   (60 min.)    Elian Fraser MD   [facility], Radiation Oncology     [facility] GAMMA TREATMENT    6:30 AM   (60 min.)   John Frederick MD   [facility], Radiation Oncology     MR BRAIN W    7:00 AM   (30 min.)   UUMR1   [facility], MRI     PROCEDURE - 2 HR    7:30 AM   (120 min.)   U2A ROOM 7   [address] [facility] 29     30     [facility] LAB DRAW    2:00 PM   (15 min.)    M[facility] LAB DRAW   [facility] Mason[facility] Lab Draw     P ONC INFUSION 60    2:30 PM   (60 min.)    ONCOLOGY INFUSION   [facility]     MR THORACIC SPINE WO    4:00 PM   (60 min.)   UUMR2   [facility], MRI 31 April 2017 Sunday Monday Tuesday Wednesday Thursday Friday Saturday                                 1       2     3     4     5     6     7     8       9     10     11     [facility] MAS[facility]IC LAB DRAW    8:00 AM   (15 min.)    MASONIC LAB DRAW   [facility] Masonic Lab Draw     [facility] RETURN    8:15 AM   (30 min.)   Nenita Bartholomew MD   [facility] 12     13     [facility] MAS[facility] LAB DRAW    9:00 AM   (15 min.)    MASONIC LAB DRAW   [facility] Masonic Lab Draw     P ONC INFUSION 60    9:30 AM   (60 min.)    ONCOLOGY INFUSION   [facility] 14     15       16     17     18     19     20     21     22       23     24     25     26     27     28     29       30                                                Lab Results:  Recent Results (from the past 12 hour(s))   CBC with platelets differential    Collection Time: 03/30/17  2:27 PM   Result Value Ref Range    WBC 5.8 4.0 - 11.0 10e9/L    RBC Count 3.29 (L) 4.4 - 5.9 10e12/L    Hemoglobin 8.6 (L) 13.3 - 17.7 g/dL    Hematocrit 27.5 (L) 40.0 - 53.0 %    MCV 84 78 - 100 fl    MCH 26.1 (L) 26.5 - 33.0 pg    MCHC 31.3 (L) 31.5 - 36.5 g/dL    RDW 19.9 (H) 10.0 - 15.0 %    Platelet Count 156 150 - 450 10e9/L    Diff Method Automated Method     % Neutrophils 88.0 %    % Lymphocytes 8.6 %    % Monocytes 2.2 %    % Eosinophils 0.2 %     % Basophils 0.0 %    % Immature Granulocytes 1.0 %    Nucleated RBCs 0 0 /100    Absolute Neutrophil 5.1 1.6 - 8.3 10e9/L    Absolute Lymphocytes 0.5 (L) 0.8 - 5.3 10e9/L    Absolute Monocytes 0.1 0.0 - 1.3 10e9/L    Absolute Eosinophils 0.0 0.0 - 0.7 10e9/L    Absolute Basophils 0.0 0.0 - 0.2 10e9/L    Abs Immature Granulocytes 0.1 0 - 0.4 10e9/L    Absolute Nucleated RBC 0.0

## 2017-03-30 NOTE — NURSING NOTE
Chief Complaint   Patient presents with     Blood Draw     vs by cma/piv-labs by vascular access RN   See flowsheets for details.  Ginger Holguin CMA

## 2017-03-30 NOTE — MR AVS SNAPSHOT
After Visit Summary   3/30/2017    Naren Kimble    MRN: [id number]           Patient Information     Date Of Birth          1975        Visit Information        Provider Department      3/30/2017 2:30 PM  29 ATC;  ONCOLOGY INFUSION [facility]        Today's Diagnoses     Non-small cell lung cancer (NSCLC) (H)    -  1      Care Instructions    Contact Numbers    [facility] Main Line: [phone]  [facility] Triage:  [phone]    Call triage with chills and/or temperature greater than or equal to 100.5, uncontrolled nausea/vomiting, diarrhea, constipation, dizziness, shortness of breath, chest pain, bleeding, unexplained bruising, or any new/concerning symptoms, questions/concerns.     If you are having any concerning symptoms or wish to speak to a provider before your next infusion visit, please call your care coordinator or triage to notify them so we can adequately serve you.       After Hours: [phone]    If after hours, weekends, or holidays, call main hospital  and ask for Oncology doctor on call.         March 2017 Sunday Monday Tuesday Wednesday Thursday Friday Saturday                  1     2     [facility] LAB DRAW    9:00 AM   (15 min.)   [facility] LAB DRAW   [facility] Lab Draw     [facility] RETURN    9:15 AM   (50 min.)   Yulia Lepe, APRN CNP   [facility] ONC INFUSION 120   11:00 AM   (120 min.)    ONCOLOGY INFUSION   [facility] 3     4       5     6     MR BRAIN W   11:00 AM   (60 min.)   UUMR1   [facility] [place], MRI 7     8     9     10     11       12     13     14     15     16     17     18       19     20     CT CHEST/ABDOMEN/PELVIS W    9:25 AM   (20 min.)   UCCT2   [facility] [place] CT     [facility] GAMMA KNIFE CONSULT   12:00 PM   (90 min.)   Elian Fraser MD   [facility] [place], Radiation Oncology     [facility] NEW    3:00 PM   (30 min.)   John Frederick MD   [facility]  Clinic 21     [facility] LAB DRAW    8:30 AM   (15 min.)    [facility]   [facility]     [facility] RETURN    8:45 AM   (30 min.)   Nenita Bartholomew MD   [facility] 22     23     [facility] ONC INFUSION 60    9:00 AM   (60 min.)    ONCOLOGY INFUSION   [facility] 24     25       26     27     28     [facility] GAMMA TREATMENT    5:15 AM   (60 min.)   Elian Fraser MD   [facility], Radiation Oncology     [facility] GAMMA TREATMENT    6:30 AM   (60 min.)   John Frederick MD   [facility], Radiation Oncology     MR BRAIN W    7:00 AM   (30 min.)   UUMR1   [facility], MRI     PROCEDURE - 2 HR    7:30 AM   (120 min.)   U2A ROOM 7   Unit 2A [facility] [place] 29     30     [facility] LAB DRAW    2:00 PM   (15 min.)    [facility] LAB DRAW   [facility]     [facility] ONC INFUSION 60    2:30 PM   (60 min.)    ONCOLOGY INFUSION   [facility]     MR THORACIC SPINE WO    4:00 PM   (60 min.)   UUMR2   [facility], MRI 31 April 2017 Sunday Monday Tuesday Wednesday Thursday Friday Saturday                                 1       2     3     4     5     6     7     8       9     10     11     [facility] LAB DRAW    8:00 AM   (15 min.)    MASONIC LAB DRAW   [facility] Masonic Lab Draw     [facility] RETURN    8:15 AM   (30 min.)   Nenita Bartholomew MD   [facility] 12     13     [facility] LAB DRAW    9:00 AM   (15 min.)    M[facility] LAB DRAW   [facility] Masonic Lab Draw     P ONC INFUSION 60    9:30 AM   (60 min.)    ONCOLOGY INFUSION   [facility] 14     15       16     17     18     19     20     21     22       23     24     25     26     27     28     29       30                                                Lab Results:  Recent Results (from the past 12 hour(s))   CBC with platelets differential    Collection Time: 03/30/17  2:27 PM   Result Value Ref Range    WBC 5.8 4.0 - 11.0 10e9/L    RBC  Count 3.29 (L) 4.4 - 5.9 10e12/L    Hemoglobin 8.6 (L) 13.3 - 17.7 g/dL    Hematocrit 27.5 (L) 40.0 - 53.0 %    MCV 84 78 - 100 fl    MCH 26.1 (L) 26.5 - 33.0 pg    MCHC 31.3 (L) 31.5 - 36.5 g/dL    RDW 19.9 (H) 10.0 - 15.0 %    Platelet Count 156 150 - 450 10e9/L    Diff Method Automated Method     % Neutrophils 88.0 %    % Lymphocytes 8.6 %    % Monocytes 2.2 %    % Eosinophils 0.2 %    % Basophils 0.0 %    % Immature Granulocytes 1.0 %    Nucleated RBCs 0 0 /100    Absolute Neutrophil 5.1 1.6 - 8.3 10e9/L    Absolute Lymphocytes 0.5 (L) 0.8 - 5.3 10e9/L    Absolute Monocytes 0.1 0.0 - 1.3 10e9/L    Absolute Eosinophils 0.0 0.0 - 0.7 10e9/L    Absolute Basophils 0.0 0.0 - 0.2 10e9/L    Abs Immature Granulocytes 0.1 0 - 0.4 10e9/L    Absolute Nucleated RBC 0.0              Follow-ups after your visit        Your next 10 appointments already scheduled     Apr 11, 2017  8:00 AM CDT   [facility] Lab Draw with  [facility] LAB DRAW   [facility]    [address] [place] [address] [id number] [phone]            Apr 11, 2017  8:30 AM CDT   (Arrive by 8:15 AM)   Return Visit with Nenita Bartholomew MD   [facility] ([facility])    [address] [place] [address] [id number] [phone]            Apr 13, 2017  9:00 AM CDT   Masonic Lab Draw with  MASONIC LAB DRAW   [facility] Masonic Lab Draw ([facility])    [address] [place] [id number] [phone]            Apr 13, 2017  9:30 AM CDT   Infusion 60 with  ONCOLOGY INFUSION, UC 22 ATC   [facility] ([facility])    [address] [place] [phone]   [phone]            May 04, 2017  9:00 AM CDT   Masonic Lab Draw with  MASONIC LAB DRAW   [facility] Masonic Lab Draw ([facility])    [address] [place] [address]  [facility]  2nd [facility] [id number] [phone]            May 04, 2017  9:30 AM CDT   Infusion 60 with UC ONCOLOGY INFUSION, UC 22 ATC   [facility] ([facility])    [address] [place] [id number] [phone]            May 25, 2017  9:30 AM CDT   Infusion 60 with UC ONCOLOGY INFUSION, UC 21 ATC   [facility] ([facility])    [address] [place] [id number] [phone]              Who to contact     If you have questions or need follow up information about today's clinic visit or your schedule please contact [facility] directly at [phone].  Normal or non-critical lab and imaging results will be communicated to you by Cardiahart, letter or phone within 4 business days after the clinic has received the results. If you do not hear from us within 7 days, please contact the clinic through Cardiahart or phone. If you have a critical or abnormal lab result, we will notify you by phone as soon as possible.  Submit refill requests through Epocrates or call your pharmacy and they will forward the refill request to us. Please allow 3 business days for your refill to be completed.          Additional Information About Your Visit        Epocrates Information     Epocrates gives you secure access to your electronic health record. If you see a primary care provider, you can also send messages to your care team and make appointments. If you have questions, please call your primary care clinic.  If you do not have a primary care provider, please call [phone] and they will assist you.        Care EveryWhere ID     This is your Care EveryWhere ID. This could be used by other organizations to access your [facility] medical records  [id number]        Your Vitals Were     Pulse Temperature Pulse Oximetry BMI (Body Mass Index)          107 97.9  F (36.6  C)  (Oral) 100% 29.47 kg/m2         Blood Pressure from Last 3 Encounters:   03/30/17 133/84   03/28/17 134/88   03/28/17 (!) 142/95    Weight from Last 3 Encounters:   03/30/17 104.1 kg (229 lb 8 oz)   03/23/17 104.3 kg (230 lb)   03/21/17 104.4 kg (230 lb 3.2 oz)              We Performed the Following     CBC with platelets differential     Treatment Conditions          Today's Medication Changes          These changes are accurate as of: 3/30/17  4:37 PM.  If you have any questions, ask your nurse or doctor.               These medicines have changed or have updated prescriptions.        Dose/Directions    dexamethasone 4 MG tablet   Commonly known as:  DECADRON   This may have changed:    - how much to take  - when to take this   Used for:  Non-small cell lung cancer, unspecified laterality (H)        Dose:  4 mg   Take 1 tablet (4 mg) by mouth 2 times daily (with meals)   Quantity:  60 tablet   Refills:  0                Primary Care Provider    None Specified       No primary provider on file.        Thank you!     Thank you for choosing [facility]  for your care. Our goal is always to provide you with excellent care. Hearing back from our patients is one way we can continue to improve our services. Please take a few minutes to complete the written survey that you may receive in the mail after your visit with us. Thank you!             Your Updated Medication List - Protect others around you: Learn how to safely use, store and throw away your medicines at www.disposemymeds.org.          This list is accurate as of: 3/30/17  4:37 PM.  Always use your most recent med list.                   Brand Name Dispense Instructions for use    albuterol 108 (90 BASE) MCG/ACT Inhaler   Generic drug:  albuterol      Inhale 1-2 puffs into the lungs daily as needed       cyclobenzaprine 10 MG tablet    FLEXERIL    90 tablet    Take 1 tablet (10 mg) by mouth 3 times daily as needed for muscle spasms        dexamethasone 4 MG tablet    DECADRON    60 tablet    Take 1 tablet (4 mg) by mouth 2 times daily (with meals)       fentaNYL 50 mcg/hr 72 hr patch    DURAGESIC    10 patch    Place 1 patch onto the skin every 72 hours       FOLIC ACID PO      Take 800 mcg by mouth daily       IBUPROFEN PO      Take 1 tablet by mouth every 8 hours as needed       levETIRAcetam 100 MG/ML solution    KEPPRA     Take 10 mg/kg by mouth 2 times daily       lidocaine 5 % Patch    LIDODERM    30 patch    Apply up to 3 patches to painful area at once for up to 12 h within a 24 h period.  Remove after 12 hours.       LORazepam 0.5 MG tablet    ATIVAN    30 tablet    Take 1 tablet (0.5 mg) by mouth every 4 hours as needed (Anxiety, Nausea/Vomiting or Sleep)       magnesium hydroxide 2400 MG/10ML Susp    MOM     Take 5 mLs by mouth daily as needed for constipation Reported on 3/2/2017       ondansetron 8 MG tablet    ZOFRAN    90 tablet    Take 1 tablet (8 mg) by mouth every 8 hours as needed       OSCAL 500/200 D-3 PO          * oxyCODONE 10 MG 12 hr tablet    OxyCONTIN    30 tablet    Take 1 tablet (10 mg) by mouth At Bedtime maximum 1tablet(s) per day       * oxyCODONE 5 MG capsule    OXY-IR    200 capsule    Take 1-3 capsules (5-15 mg) by mouth every 4 hours as needed for moderate to severe pain       pantoprazole 40 MG EC tablet    PROTONIX    30 tablet    Take 1 tablet (40 mg) by mouth daily       * prochlorperazine 10 MG tablet    COMPAZINE    30 tablet    Take 1 tablet (10 mg) by mouth every 6 hours as needed (Nausea/Vomiting)       * prochlorperazine 10 MG tablet    COMPAZINE    30 tablet    Take 1 tablet (10 mg) by mouth every 6 hours as needed (Nausea/Vomiting)       SENNA CO          TYLENOL PO      Take 500 mg by mouth daily as needed       * Notice:  This list has 4 medication(s) that are the same as other medications prescribed for you. Read the directions carefully, and ask your doctor or other care provider to review them with  you.

## 2017-03-31 NOTE — PROGRESS NOTES
Called Valerio to let him know we are refilling script for oxycodone.  He reports that he has enough medication to get to Monday when he will be back in [place].    Today the pain is not too bad.  He occasionally wears the Lidoderm patches- when he knows he will be having to sit for long periods of time.   They provide some relief.    Again offered Valerio the services of Palliative Care MD and ASHLYN.  Specifically discussed that the Palliative Care MD might be able to help get pain under better control.   Valerio agreed to Palliative MD appt, we will get that scheduled to match a future appt.

## 2017-04-06 NOTE — LETTER
"4/6/2017       RE: Naren Kimble  [address]  [facility] [address]     Dear Colleague,    Thank you for referring your patient, Naren Kimble, to the [facility] at [facility]. Please see a copy of my visit note below.    Palliative Care Outpatient Clinic Consultation Note    Patient:  Naren Kimble    Chief Complaint:   Naren Kimble 42 year old male who is presenting to the palliative medicine clinic today with his wife at the request of oncology for a palliative care consultation secondary to NSCLC and pain. The patient's primary care provider is:  No primary care provider on file..     History of Present Illness:    Valerio Kimble is a 41 yo male with widely metastatic NSCLC. He was diagnosed in July 2016 after presenting with a brain mass, now s/p resection. He has undergone chemoradiation and unfortunately has progressive disease. He was recently started on Gemcitabine and Vinorelbine and underwent gamma knife radiation to multiple intracranial metastases. He also has significant metastases throughout his spine with known compression fx.     Today, Valerio is most interested in discussing his back pain. He describes having pain in his low back and hips since last October, but pain has been worse in the past 1 week. His pain is worse with walking up steps. Laying down helps the pain. His activity has significantly decreased in the past week due to pain, and he is requesting a wheelchair to help get around when he's out of the house for longer periods of time. Prior to 1 week ago, his low back pain pain would \"come and go\" without leg numbness, and was overall fairly tolerable. He has no back pain in the mid or upper back, even though he has known thoracic compression fx. He also has new LE weakness, primarily in the left leg, as well as left anterior and posterior thigh numbess. He's been taking oxcodone 15 mg q 3 hours, sometimes even at night. " "He's also using a fentanyl patch 50 mcg/hr and lidocaine patches (although, lidocaine has not been helpful). He also has OxyContin 10 mg for nightly use which he's not really using due to morning drowsiness. He's been on fentanyl since ~November 2016 and overall it's been quite helpful with minimal side effects. He is also using Flexeril BID, and Ibuprofen 800 mg TID PRN (which is helpful). He is interested in trying medical cannabis for pain.    He denies constipation or bowel problems, and has been using MOM every 3 days to keep his bowels regular. Denies nausea or vomiting, chest pain, or trouble breathing.    Patient's Disease Understanding: Valerio understands his cancer is incurable, and has heard he may live for \"1-2 years\". He wants to \"keep fighting\" as much as he can.    Coping:  Overall, Valerio says he's coping well, but would like to be more active with his kids. He says his cancer diagnosis \"sucks\".    Social History  Living Situation: Lives with wife, Jhonatan, and children in [place], [place].  Children: 2 children, 13 and 10  Actual/Potential Caregiver(s): Wife, other family  Support System: Family (mom 3 hours away). Valerio getting some help with Jhonatan with IADLs.  Occupation: Worked up until 2 weeks ago. Jhonatan working when she can.   Hobbies: Spending time with family, being physically active and social  Substance Use/History of misuse: No  Financial Concerns: No  Spiritual Background: Yes, has adequate support through local [employer]    Social History   Substance Use Topics     Smoking status: Never Smoker     Smokeless tobacco: Not on file     Alcohol use 0.0 oz/week     0 Standard drinks or equivalent per week      Comment: occasional       Family History  Patient's Involvement with Prior History of Serious Illness in Family:   No family history on file.      Advance Care Planning:  Advance Directive: Has an AD filled out from Oct 2016 (in [employer] system) that is up-to-date. Will bring in a copy at next " visit.  Health Care Agent Contact Information: Wife, Jhonatan      REVIEW OF SYSTEMS:   ROS: 10 point ROS neg other than the symptoms noted above in the HPI and here:  Palliative Symptom Review       Pain: yes       Fatigue: yes, especially today      Nausea: no      Constipation: No, using MOM occassionally      Diarrhea: no      Depressive Symptoms: PHQ-9: 2 (no SI).       Anxiety: no      Drowsiness: no      Poor Appetite: yes      Shortness of Breath: no      Insomnia: no, using occasional lorazepam. Sleeping on the couch at night due to stiffness and pain           Physical Exam:   Vitals were reviewed  /82 (BP Location: Right arm, Patient Position: Chair, Cuff Size: Adult Large)  Pulse 127  Temp 96.7  F (35.9  C) (Oral)  Resp 18  Ht 1.829 m (6')  Wt 110.8 kg (244 lb 4.8 oz)  SpO2 99%  BMI 33.13 kg/m2  General: Alert,uncomfortable appearing male frequently shifting his weight in the chair due to discomfort  Eyes: Pupils equal, sclera clear.   ENT: MMM. No ulcerations or plaques.   Cardiac: Tachycardic, regular rhythm, no murmurs.    Resp: CTAB, unlabored on room air.   Abd: Soft, nontender   Ext: Warm and well perfused.  No pedal edema.   Neuro: No facial asymmetry.  Spontaneous movements grossly non-focal.  Muscle strength 4/5 in LE high flexion and knee extention, 5/5 in RLE; sensation in LEs grossly intact, bilaterall patellar reflexes 1/4, gait guarded due to pain  Neuropsych: Alert, appropriately interactive, full affect.     Data Reviewed:  LABS:   Lab Results   Component Value Date    WBC 5.8 03/30/2017    HGB 8.6 (L) 03/30/2017    HCT 27.5 (L) 03/30/2017     03/30/2017     03/21/2017    POTASSIUM 4.8 03/21/2017    CHLORIDE 103 03/21/2017    CO2 25 03/21/2017    BUN 33 (H) 03/21/2017    CR 0.63 (L) 03/21/2017    GLC 92 03/21/2017    AST 31 03/21/2017    ALT 32 03/21/2017    ALKPHOS 107 03/21/2017    BILITOTAL 0.3 03/21/2017     IMAGING: Reviewed today    Images personally  "reviewed: Yes, no additions to the above interpretation by Radiologist    [place]  - Reviewed    Impressions:  Palliative Performance Score:  80%  Decision Making Capacity:  Intact and appropriate      Recommendations & Counseling:    Valerio Kimble is a 43 yo male who is establishing with palliative care due to widely metastatic NSCLC and significant back pain and leg weakness. Valerio and his wife Jhonatan were oriented to palliative clinic, and we addressed the following today:    1. Low back pain with radiculopathy: Valerio's pain has been getting progressively worse in the past week, and he has new LE weakness. His lumbar MRI was reviewed from January 2017 which showed significant disc bulging and foraminal narrowing with diffuse spinal mets. His symptoms are concerning for nerve root impingement or leptomeningeal disease, but may also be due to worsening disc disease.  -     MRI Lumbar spine w & w/o contrast.Will add on to thoracic MRI that's already ordered. Valerio is planning to come back on 4/7 for these. Oncology notified.        -     If MRI does not show significant change from January or new cord compressions, will consider referal to interventional pain for possible lumbar CSI injections   -     order for DME; Equipment being ordered: Wheelchair  -     gabapentin (NEURONTIN) 300 MG capsule; Take 1 capsule (300 mg) by mouth 3 times daily Start with 300 mg at night for first 2 nights, then increase to prescribed dose  -     fentaNYL (DURAGESIC) 25 mcg/hr 72 hr patch; Place 1 patch onto the skin every 72 hours Please up with 50 mcg patch (total of 75 mcg)  -     Increase oxycodone to 10-20 mg q 3 hours PRN breakthrough pain  -     Will certify for medical cannabis program    2. Metastatic NSCLC: Valerio seems to have a good understanding of his prognosis, and wants to keep \"fighting\" as much as he can, especially since he has two younger children. He feels well supported in his community, and says his children see a " counselor at their school. I offered palliative social work services in the future.    3. Advance Care Planning: Valerio has an AD filled out from last year, and will bring a copy to our next visit or his upcoming visit with Dr. Bartholomew. We did not talk further about this today.    Thank you for involving me in the care of this patient.     -- RTC in 3-4 weeks    Patient seen and examined by Dr. Vega.      Tarcy Cervantes DO  Hospice and Palliative Medicine Fellow  [facility]          Additional History Reviewed:   Allergies   Allergen Reactions     Animal Dander Shortness Of Breath     Cats Shortness Of Breath     Current Outpatient Prescriptions   Medication Sig Dispense Refill     oxyCODONE (OXY-IR) 5 MG capsule Take 1-3 capsules (5-15 mg) by mouth every 4 hours as needed for moderate to severe pain 200 capsule 0     LORazepam (ATIVAN) 0.5 MG tablet Take 1 tablet (0.5 mg) by mouth every 4 hours as needed (Anxiety, Nausea/Vomiting or Sleep) 30 tablet 2     prochlorperazine (COMPAZINE) 10 MG tablet Take 1 tablet (10 mg) by mouth every 6 hours as needed (Nausea/Vomiting) 30 tablet 2     pantoprazole (PROTONIX) 40 MG EC tablet Take 1 tablet (40 mg) by mouth daily 30 tablet 3     fentaNYL (DURAGESIC) 50 mcg/hr 72 hr patch Place 1 patch onto the skin every 72 hours 10 patch 0     dexamethasone (DECADRON) 4 MG tablet Take 1 tablet (4 mg) by mouth 2 times daily (with meals) (Patient taking differently: Take 2 mg by mouth daily (with breakfast) ) 60 tablet 0     cyclobenzaprine (FLEXERIL) 10 MG tablet Take 1 tablet (10 mg) by mouth 3 times daily as needed for muscle spasms 90 tablet 0     prochlorperazine (COMPAZINE) 10 MG tablet Take 1 tablet (10 mg) by mouth every 6 hours as needed (Nausea/Vomiting) 30 tablet 5     ondansetron (ZOFRAN) 8 MG tablet Take 1 tablet (8 mg) by mouth every 8 hours as needed 90 tablet 3     lidocaine (LIDODERM) 5 % Patch Apply up to 3 patches to painful area at once for up to 12 h  within a 24 h period.  Remove after 12 hours. 30 patch 0     albuterol (ALBUTEROL) 108 (90 BASE) MCG/ACT Inhaler Inhale 1-2 puffs into the lungs daily as needed       oxyCODONE (OXYCONTIN) 10 MG 12 hr tablet Take 1 tablet (10 mg) by mouth At Bedtime maximum 1tablet(s) per day 30 tablet 0     IBUPROFEN PO Take 1 tablet by mouth every 8 hours as needed        magnesium hydroxide (MOM) 2400 MG/10ML SUSP Take 5 mLs by mouth daily as needed for constipation Reported on 3/2/2017       SENNA CO        Calcium Carbonate-Vitamin D (OSCAL 500/200 D-3 PO)        Acetaminophen (TYLENOL PO) Take 500 mg by mouth daily as needed        levETIRAcetam (KEPPRA) 100 MG/ML solution Take 10 mg/kg by mouth 2 times daily       FOLIC ACID PO Take 800 mcg by mouth daily       Past Medical History:   Diagnosis Date     Lung cancer (H)     adenocarcinoma, metastatic to brain, bone     Sleep apnea     CPAP     Past Surgical History:   Procedure Laterality Date     BIOPSY  8/8/2016    bronchoscopy FNA lymph node, hilar  node, paratracheal node     CRANIOTOMY  7/27/2016    left frontal craniotomy     No family history on file.    Attending Physician Attestation    Patient seen & evaluated with Dr Cervantes. I agree with and confirm leroy findings in her note. Pt presents to clinic today with acute on chronic low back pain, now with some hip flexor weakness on the left.  Will add on lumbar MRI to evaluate for metastatic nerve root impingement.  No bowel or bladder dysfunction.  Reviewed with Dr. Bartholomew and team, they will plan to f/u on results from both the thoracic and lumbar MRI's.  Will escalate pain medications as noted above.     Marlena Vega MD / Palliative Medicine / Pager [phone] / After-Hours Answering Service [phone] / Main Palliative Clinic - [facility] [phone] / [facility] Inpatient Team Consult Pager [phone] (answered 8am-430pm M-F)      Again, thank you for allowing me to participate in the care of your  patient.      Sincerely,    Tracy Cervantes, , DO

## 2017-04-06 NOTE — MR AVS SNAPSHOT
After Visit Summary   4/6/2017    Naren Kimble    MRN: [id number]           Patient Information     Date Of Birth          1975        Visit Information        Provider Department      4/6/2017 12:45 PM Tracy Cervantes DO M [facility]        Today's Diagnoses     Acute left-sided low back pain with left-sided sciatica    -  1    Non-small cell lung cancer (NSCLC) (H)          Care Instructions    Please use 75 mcg fentanyl (50 mcg + 25 mcg patch)  Use 10-20 mg oxycodone (2-4 tabs) every 3 hours as needed for pain  Take gabapentin as prescribed for pain  We will call with MRI results if there acute concerns. If no acute concerns with the MRI, I will refer to intervention pain for consideration of a back injection    Please come back in 4 weeks  We are happy to refill your pain medications moving foward        Follow-ups after your visit        Your next 10 appointments already scheduled     Apr 06, 2017  2:00 PM CDT   MR LUMBAR SPINE W/O & W CONTRAST with UUMR2   [facility], [place], [facility] ([facility], [facility])    [address] [facility] [address] [phone]           Take your medicines as usual, unless your doctor tells you not to. Bring a list of your current medicines to your exam (including vitamins, minerals and over-the-counter drugs).  You will be given intravenous contrast for this exam. To prepare:   The day before your exam, drink extra fluids at least six 8-ounce glasses (unless your doctor tells you to restrict your fluids).   Have a blood test (creatinine test) within 30 days of your exam. Go to your clinic or Diagnostic Imaging Department for this test.  The MRI machine uses a strong magnet. Please wear clothes without metal (snaps, zippers). A sweatsuit works well, or we may give you a hospital gown.  Please remove any body piercings and hair extensions before you arrive. You will also remove watches,  jewelry, hairpins, wallets, dentures, partial dental plates and hearing aids. You may wear contact lenses, and you may be able to wear your rings. We have a safe place to keep your personal items, but it is safer to leave them at home.   **IMPORTANT** THE INSTRUCTIONS BELOW ARE ONLY FOR THOSE PATIENTS WHO HAVE BEEN TOLD THEY WILL RECEIVE SEDATION OR GENERAL ANESTHESIA DURING THEIR MRI PROCEDURE:  IF YOU WILL RECEIVE SEDATION (take medicine to help you relax during your exam):   You must get the medicine from your doctor before you arrive. Bring the medicine to the exam. Do not take it at home.   Arrive one hour early. Bring someone who can take you home after the test. Your medicine will make you sleepy. After the exam, you may not drive, take a bus or take a taxi by yourself.   No eating 8 hours before your exam. You may have clear liquids up until 4 hours before your exam. (Clear liquids include water, clear tea, black coffee and fruit juice without pulp.)  IF YOU WILL RECEIVE ANESTHESIA (be asleep for your exam):   Arrive 1 1/2 hours early. Bring someone who can take you home after the test. You may not drive, take a bus or take a taxi by yourself.   No eating 8 hours before your exam. You may have clear liquids up until 4 hours before your exam. (Clear liquids include water, clear tea, black coffee and fruit juice without pulp.)  Please call the Imaging Department at your exam site with any questions.            Apr 06, 2017  3:00 PM CDT   MR THORACIC SPINE W/O CONTRAST with UUMR2   [facility], [place], [facility] ([facility])    [address] [facility] [address] [phone]           Take your medicines as usual, unless your doctor tells you not to. Bring a list of your current medicines to your exam (including vitamins, minerals and over-the-counter drugs). Also bring the results of similar scans you may have had.  Please remove any body piercings and  hair extensions before you arrive.  Follow your doctor s orders. If you do not, we may have to postpone your exam.  You will not have contrast for this exam. You do not need to do anything special to prepare.  The MRI machine uses a strong magnet. Please wear clothes without metal (snaps, zippers). A sweatsuit works well, or we may give you a hospital gown.   **IMPORTANT** THE INSTRUCTIONS BELOW ARE ONLY FOR THOSE PATIENTS WHO HAVE BEEN TOLD THEY WILL RECEIVE SEDATION OR GENERAL ANESTHESIA DURING THEIR MRI PROCEDURE:  IF YOU WILL RECEIVE SEDATION (take medicine to help you relax during your exam):   You must get the medicine from your doctor before you arrive. Bring the medicine to the exam. Do not take it at home.   Arrive one hour early. Bring someone who can take you home after the test. Your medicine will make you sleepy. After the exam, you may not drive, take a bus or take a taxi by yourself.   No eating 8 hours before your exam. You may have clear liquids up until 4 hours before your exam. (Clear liquids include water, clear tea, black coffee and fruit juice without pulp.)  IF YOU WILL RECEIVE ANESTHESIA (be asleep for your exam):   Arrive 1 1/2 hours early. Bring someone who can take you home after the test. You may not drive, take a bus or take a taxi by yourself.   No eating 8 hours before your exam. You may have clear liquids up until 4 hours before your exam. (Clear liquids include water, clear tea, black coffee and fruit juice without pulp.)   You will spend four to five hours in the recovery room.  Please call the Imaging Department at your exam site with any questions.            Apr 11, 2017  8:00 AM SABRINAT   CRV Lab Draw with  STEPHANE LAB DRAW   [facility] [place] [facility] ([facility] [place])    [address] [address] [phone]            Apr 11, 2017  8:30 AM CDT   (Arrive by 8:15 AM)   Return Visit with MD RAMÓN [facility] ([facility])    [address] [place] [address]   [phone]            Apr 13, 2017  9:00 AM CDT   Masonic Lab Draw with UC MASONIC LAB DRAW   [facility] Draw ([facility])    [address] [place] [address]   [phone]            Apr 13, 2017  9:30 AM CDT   Infusion 60 with UC ONCOLOGY INFUSION, UC 22 ATC   [facility] ([facility])    [address] [place] [address]   [phone]            May 04, 2017  9:00 AM CDT   Masonic Lab Draw with UC MASONIC LAB DRAW   [facility] Lab Draw ([facility])    [address] [place] [address]   [phone]            May 04, 2017  9:30 AM CDT   Infusion 60 with UC ONCOLOGY INFUSION, UC 22 ATC   [facility] ([facility])    [address] [place] [address]   [phone]              Future tests that were ordered for you today     Open Future Orders        Priority Expected Expires Ordered    MRI Lumbar spine w & w/o contrast Routine  4/6/2018 4/6/2017            Who to contact     If you have questions or need follow up information about today's clinic visit or your schedule please contact [facility] directly at [phone].  Normal or non-critical lab and imaging results will be communicated to you by MyChart, letter or phone within 4 business days after the clinic has received the results. If you do not hear from us within 7 days, please contact the clinic through [employer] or phone. If you have a critical or abnormal lab result, we will notify you by phone as soon as possible.  Submit refill requests through [employer] or call your pharmacy and they will forward the refill request to us. Please allow 3 business days for your refill  to be completed.          Additional Information About Your Visit        [employer] Information     SeeMe gives you secure access to your electronic health record. If you see a primary care provider, you can also send messages to your care team and make appointments. If you have questions, please call your primary care clinic.  If you do not have a primary care provider, please call [phone] and they will assist you.        Care EveryWhere ID     This is your Care EveryWhere ID. This could be used by other organizations to access your [place] medical records  [id number]        Your Vitals Were     Pulse Temperature Respirations Height Pulse Oximetry BMI (Body Mass Index)    127 96.7  F (35.9  C) (Oral) 18 1.829 m (6') 99% 33.13 kg/m2       Blood Pressure from Last 3 Encounters:   04/06/17 126/82   03/30/17 133/84   03/28/17 134/88    Weight from Last 3 Encounters:   04/06/17 110.8 kg (244 lb 4.8 oz)   03/30/17 104.1 kg (229 lb 8 oz)   03/23/17 104.3 kg (230 lb)                 Today's Medication Changes          These changes are accurate as of: 4/6/17  1:59 PM.  If you have any questions, ask your nurse or doctor.               Start taking these medicines.        Dose/Directions    gabapentin 300 MG capsule   Commonly known as:  NEURONTIN   Used for:  Non-small cell lung cancer (NSCLC) (H), Acute left-sided low back pain with left-sided sciatica   Started by:  Tracy Cervantes DO        Dose:  300 mg   Take 1 capsule (300 mg) by mouth 3 times daily Start with 300 mg at night for first 2 nights, then increase to prescribed dose   Quantity:  90 capsule   Refills:  1       order for DME   Used for:  Acute left-sided low back pain with left-sided sciatica, Non-small cell lung cancer (NSCLC) (H)   Started by:  Tracy Cervantes DO        Equipment being ordered: Wheelchair   Quantity:  1 Units   Refills:  0         These medicines have changed or have updated prescriptions.        Dose/Directions    * fentaNYL  50 mcg/hr 72 hr patch   Commonly known as:  DURAGESIC   This may have changed:  Another medication with the same name was added. Make sure you understand how and when to take each.   Used for:  Left-sided low back pain without sciatica, unspecified chronicity, Brain metastasis (H), Non-small cell lung cancer (NSCLC) (H)   Changed by:  Nenita Bartholomew MD        Dose:  1 patch   Place 1 patch onto the skin every 72 hours   Quantity:  10 patch   Refills:  0       * fentaNYL 25 mcg/hr 72 hr patch   Commonly known as:  DURAGESIC   This may have changed:  You were already taking a medication with the same name, and this prescription was added. Make sure you understand how and when to take each.   Used for:  Non-small cell lung cancer (NSCLC) (H), Acute left-sided low back pain with left-sided sciatica   Changed by:  Tracy Cervantes DO        Dose:  1 patch   Place 1 patch onto the skin every 72 hours Please use with 50 mcg patch (total of 75 mcg)   Quantity:  10 patch   Refills:  0       prochlorperazine 10 MG tablet   Commonly known as:  COMPAZINE   This may have changed:  Another medication with the same name was removed. Continue taking this medication, and follow the directions you see here.   Used for:  Non-small cell lung cancer (NSCLC) (H), Non-small cell lung cancer, unspecified laterality (H)   Changed by:  Yulia Lepe APRN CNP        Dose:  10 mg   Take 1 tablet (10 mg) by mouth every 6 hours as needed (Nausea/Vomiting)   Quantity:  30 tablet   Refills:  5       * Notice:  This list has 2 medication(s) that are the same as other medications prescribed for you. Read the directions carefully, and ask your doctor or other care provider to review them with you.         Where to get your medicines      These medications were sent to [facility] [address] [facility], [place] - [address]  [address] , [facility] [address]     Phone:  [phone]     gabapentin 300 MG capsule          Some of these will need a paper prescription and others can be bought over the counter.  Ask your nurse if you have questions.     Bring a paper prescription for each of these medications     fentaNYL 25 mcg/hr 72 hr patch    order for DME                Primary Care Provider    None Specified       No primary provider on file.        Thank you!     Thank you for choosing [facility]  for your care. Our goal is always to provide you with excellent care. Hearing back from our patients is one way we can continue to improve our services. Please take a few minutes to complete the written survey that you may receive in the mail after your visit with us. Thank you!             Your Updated Medication List - Protect others around you: Learn how to safely use, store and throw away your medicines at www.disposemymeds.org.          This list is accurate as of: 4/6/17  1:59 PM.  Always use your most recent med list.                   Brand Name Dispense Instructions for use    albuterol 108 (90 BASE) MCG/ACT Inhaler   Generic drug:  albuterol      Inhale 1-2 puffs into the lungs daily as needed       cyclobenzaprine 10 MG tablet    FLEXERIL    90 tablet    Take 1 tablet (10 mg) by mouth 3 times daily as needed for muscle spasms       dexamethasone 4 MG tablet    DECADRON    60 tablet    Take 1 tablet (4 mg) by mouth 2 times daily (with meals)       * fentaNYL 50 mcg/hr 72 hr patch    DURAGESIC    10 patch    Place 1 patch onto the skin every 72 hours       * fentaNYL 25 mcg/hr 72 hr patch    DURAGESIC    10 patch    Place 1 patch onto the skin every 72 hours Please use with 50 mcg patch (total of 75 mcg)       FOLIC ACID PO      Take 800 mcg by mouth daily       gabapentin 300 MG capsule    NEURONTIN    90 capsule    Take 1 capsule (300 mg) by mouth 3 times daily Start with 300 mg at night for first 2 nights, then increase to prescribed dose       IBUPROFEN PO      Take 1 tablet by mouth every 8 hours as  needed       levETIRAcetam 100 MG/ML solution    KEPPRA     Take 10 mg/kg by mouth 2 times daily       lidocaine 5 % Patch    LIDODERM    30 patch    Apply up to 3 patches to painful area at once for up to 12 h within a 24 h period.  Remove after 12 hours.       LORazepam 0.5 MG tablet    ATIVAN    30 tablet    Take 1 tablet (0.5 mg) by mouth every 4 hours as needed (Anxiety, Nausea/Vomiting or Sleep)       magnesium hydroxide 2400 MG/10ML Susp    MOM     Take 5 mLs by mouth daily as needed for constipation Reported on 3/2/2017       ondansetron 8 MG tablet    ZOFRAN    90 tablet    Take 1 tablet (8 mg) by mouth every 8 hours as needed       order for DME     1 Units    Equipment being ordered: Wheelchair       OSCAL 500/200 D-3 PO          * oxyCODONE 10 MG 12 hr tablet    OxyCONTIN    30 tablet    Take 1 tablet (10 mg) by mouth At Bedtime maximum 1tablet(s) per day       * oxyCODONE 5 MG capsule    OXY-IR    200 capsule    Take 1-3 capsules (5-15 mg) by mouth every 4 hours as needed for moderate to severe pain       pantoprazole 40 MG EC tablet    PROTONIX    30 tablet    Take 1 tablet (40 mg) by mouth daily       prochlorperazine 10 MG tablet    COMPAZINE    30 tablet    Take 1 tablet (10 mg) by mouth every 6 hours as needed (Nausea/Vomiting)       SENNA CO          TYLENOL PO      Take 500 mg by mouth daily as needed       * Notice:  This list has 4 medication(s) that are the same as other medications prescribed for you. Read the directions carefully, and ask your doctor or other care provider to review them with you.

## 2017-04-06 NOTE — NURSING NOTE
Naren Kimble is a 42 year old male who presents for:  Chief Complaint   Patient presents with     Oncology Clinic Visit     Non-small cell lung cancer         Initial Vitals:  /82 (BP Location: Right arm, Patient Position: Chair, Cuff Size: Adult Large)  Pulse 127  Temp 96.7  F (35.9  C) (Oral)  Resp 18  Ht 1.829 m (6')  Wt 110.8 kg (244 lb 4.8 oz)  SpO2 99%  BMI 33.13 kg/m2 Estimated body mass index is 33.13 kg/(m^2) as calculated from the following:    Height as of this encounter: 1.829 m (6').    Weight as of this encounter: 110.8 kg (244 lb 4.8 oz).. Body surface area is 2.37 meters squared. BP completed using cuff size: large  Extreme Pain (8) No LMP for male patient. Allergies and medications reviewed.     Medications: Medication refills not needed today.  Pharmacy name entered into [facility]:    [facility] [place], [place] - [address] [place] [address] [address] [place] [address] DRUG STORE [address] [place] [address] [address] AT [facility] [address]    Comments: pain level of an 8 in low back, hips, and legs     6 minutes for nursing intake (face to face time)   Darya Crespo CMA

## 2017-04-06 NOTE — PROGRESS NOTES
"Palliative Care Outpatient Clinic Consultation Note    Patient:  Naren Kimble    Chief Complaint:   Naren Kimble 42 year old male who is presenting to the palliative medicine clinic today with his wife at the request of oncology for a palliative care consultation secondary to NSCLC and pain. The patient's primary care provider is:  No primary care provider on file..     History of Present Illness:    Valerio Kimble is a 43 yo male with widely metastatic NSCLC. He was diagnosed in July 2016 after presenting with a brain mass, now s/p resection. He has undergone chemoradiation and unfortunately has progressive disease. He was recently started on Gemcitabine and Vinorelbine and underwent gamma knife radiation to multiple intracranial metastases. He also has significant metastases throughout his spine with known compression fx.     Today, Valerio is most interested in discussing his back pain. He describes having pain in his low back and hips since last October, but pain has been worse in the past 1 week. His pain is worse with walking up steps. Laying down helps the pain. His activity has significantly decreased in the past week due to pain, and he is requesting a wheelchair to help get around when he's out of the house for longer periods of time. Prior to 1 week ago, his low back pain pain would \"come and go\" without leg numbness, and was overall fairly tolerable. He has no back pain in the mid or upper back, even though he has known thoracic compression fx. He also has new LE weakness, primarily in the left leg, as well as left anterior and posterior thigh numbess. He's been taking oxcodone 15 mg q 3 hours, sometimes even at night. He's also using a fentanyl patch 50 mcg/hr and lidocaine patches (although, lidocaine has not been helpful). He also has OxyContin 10 mg for nightly use which he's not really using due to morning drowsiness. He's been on fentanyl since ~November 2016 and overall it's been quite helpful " "with minimal side effects. He is also using Flexeril BID, and Ibuprofen 800 mg TID PRN (which is helpful). He is interested in trying medical cannabis for pain.    He denies constipation or bowel problems, and has been using MOM every 3 days to keep his bowels regular. Denies nausea or vomiting, chest pain, or trouble breathing.    Patient's Disease Understanding: Valerio understands his cancer is incurable, and has heard he may live for \"1-2 years\". He wants to \"keep fighting\" as much as he can.    Coping:  Overall, Valerio says he's coping well, but would like to be more active with his kids. He says his cancer diagnosis \"sucks\".    Social History  Living Situation: Lives with wife, Jhonatan, and children in [place], [place].  Children: 2 children, 13 and 10  Actual/Potential Caregiver(s): Wife, other family  Support System: Family (mom 3 hours away). Valerio getting some help with Jhonatan with IADLs.  Occupation: Worked up until 2 weeks ago. Jhonatan working when she can.   Hobbies: Spending time with family, being physically active and social  Substance Use/History of misuse: No  Financial Concerns: No  Spiritual Background: Yes, has adequate support through local [employer]    Social History   Substance Use Topics     Smoking status: Never Smoker     Smokeless tobacco: Not on file     Alcohol use 0.0 oz/week     0 Standard drinks or equivalent per week      Comment: occasional       Family History  Patient's Involvement with Prior History of Serious Illness in Family:   No family history on file.      Advance Care Planning:  Advance Directive: Has an AD filled out from Oct 2016 (in [facility] system) that is up-to-date. Will bring in a copy at next visit.  Health Care Agent Contact Information: WifeJhonatan      REVIEW OF SYSTEMS:   ROS: 10 point ROS neg other than the symptoms noted above in the HPI and here:  Palliative Symptom Review       Pain: yes       Fatigue: yes, especially today      Nausea: no      Constipation: No, using " MOM occassionally      Diarrhea: no      Depressive Symptoms: PHQ-9: 2 (no SI).       Anxiety: no      Drowsiness: no      Poor Appetite: yes      Shortness of Breath: no      Insomnia: no, using occasional lorazepam. Sleeping on the couch at night due to stiffness and pain           Physical Exam:   Vitals were reviewed  /82 (BP Location: Right arm, Patient Position: Chair, Cuff Size: Adult Large)  Pulse 127  Temp 96.7  F (35.9  C) (Oral)  Resp 18  Ht 1.829 m (6')  Wt 110.8 kg (244 lb 4.8 oz)  SpO2 99%  BMI 33.13 kg/m2  General: Alert,uncomfortable appearing male frequently shifting his weight in the chair due to discomfort  Eyes: Pupils equal, sclera clear.   ENT: MMM. No ulcerations or plaques.   Cardiac: Tachycardic, regular rhythm, no murmurs.    Resp: CTAB, unlabored on room air.   Abd: Soft, nontender   Ext: Warm and well perfused.  No pedal edema.   Neuro: No facial asymmetry.  Spontaneous movements grossly non-focal.  Muscle strength 4/5 in LE high flexion and knee extention, 5/5 in RLE; sensation in LEs grossly intact, bilaterall patellar reflexes 1/4, gait guarded due to pain  Neuropsych: Alert, appropriately interactive, full affect.     Data Reviewed:  LABS:   Lab Results   Component Value Date    WBC 5.8 03/30/2017    HGB 8.6 (L) 03/30/2017    HCT 27.5 (L) 03/30/2017     03/30/2017     03/21/2017    POTASSIUM 4.8 03/21/2017    CHLORIDE 103 03/21/2017    CO2 25 03/21/2017    BUN 33 (H) 03/21/2017    CR 0.63 (L) 03/21/2017    GLC 92 03/21/2017    AST 31 03/21/2017    ALT 32 03/21/2017    ALKPHOS 107 03/21/2017    BILITOTAL 0.3 03/21/2017     IMAGING: Reviewed today    Images personally reviewed: Yes, no additions to the above interpretation by Radiologist    [place]  - Reviewed    Impressions:  Palliative Performance Score:  80%  Decision Making Capacity:  Intact and appropriate      Recommendations & Counseling:    Valerio Kimble is a 41 yo male who is establishing with palliative  "care due to widely metastatic NSCLC and significant back pain and leg weakness. Valerio and his wife Jhonatan were oriented to palliative clinic, and we addressed the following today:    1. Low back pain with radiculopathy: Valerio's pain has been getting progressively worse in the past week, and he has new LE weakness. His lumbar MRI was reviewed from January 2017 which showed significant disc bulging and foraminal narrowing with diffuse spinal mets. His symptoms are concerning for nerve root impingement or leptomeningeal disease, but may also be due to worsening disc disease.  -     MRI Lumbar spine w & w/o contrast.Will add on to thoracic MRI that's already ordered. Valerio is planning to come back on 4/7 for these. Oncology notified.        -     If MRI does not show significant change from January or new cord compressions, will consider referal to interventional pain for possible lumbar CSI injections   -     order for DME; Equipment being ordered: Wheelchair  -     gabapentin (NEURONTIN) 300 MG capsule; Take 1 capsule (300 mg) by mouth 3 times daily Start with 300 mg at night for first 2 nights, then increase to prescribed dose  -     fentaNYL (DURAGESIC) 25 mcg/hr 72 hr patch; Place 1 patch onto the skin every 72 hours Please up with 50 mcg patch (total of 75 mcg)  -     Increase oxycodone to 10-20 mg q 3 hours PRN breakthrough pain  -     Will certify for medical cannabis program    2. Metastatic NSCLC: Valerio seems to have a good understanding of his prognosis, and wants to keep \"fighting\" as much as he can, especially since he has two younger children. He feels well supported in his community, and says his children see a counselor at their school. I offered palliative social work services in the future.    3. Advance Care Planning: Valerio has an AD filled out from last year, and will bring a copy to our next visit or his upcoming visit with Dr. Bartholomew. We did not talk further about this today.    Thank you for involving me " in the care of this patient.     -- RTC in 3-4 weeks    Patient seen and examined by Dr. Vega.      Tracy Cervantes DO  Hospice and Palliative Medicine Fellow  [facility]          Additional History Reviewed:   Allergies   Allergen Reactions     Animal Dander Shortness Of Breath     Cats Shortness Of Breath     Current Outpatient Prescriptions   Medication Sig Dispense Refill     oxyCODONE (OXY-IR) 5 MG capsule Take 1-3 capsules (5-15 mg) by mouth every 4 hours as needed for moderate to severe pain 200 capsule 0     LORazepam (ATIVAN) 0.5 MG tablet Take 1 tablet (0.5 mg) by mouth every 4 hours as needed (Anxiety, Nausea/Vomiting or Sleep) 30 tablet 2     prochlorperazine (COMPAZINE) 10 MG tablet Take 1 tablet (10 mg) by mouth every 6 hours as needed (Nausea/Vomiting) 30 tablet 2     pantoprazole (PROTONIX) 40 MG EC tablet Take 1 tablet (40 mg) by mouth daily 30 tablet 3     fentaNYL (DURAGESIC) 50 mcg/hr 72 hr patch Place 1 patch onto the skin every 72 hours 10 patch 0     dexamethasone (DECADRON) 4 MG tablet Take 1 tablet (4 mg) by mouth 2 times daily (with meals) (Patient taking differently: Take 2 mg by mouth daily (with breakfast) ) 60 tablet 0     cyclobenzaprine (FLEXERIL) 10 MG tablet Take 1 tablet (10 mg) by mouth 3 times daily as needed for muscle spasms 90 tablet 0     prochlorperazine (COMPAZINE) 10 MG tablet Take 1 tablet (10 mg) by mouth every 6 hours as needed (Nausea/Vomiting) 30 tablet 5     ondansetron (ZOFRAN) 8 MG tablet Take 1 tablet (8 mg) by mouth every 8 hours as needed 90 tablet 3     lidocaine (LIDODERM) 5 % Patch Apply up to 3 patches to painful area at once for up to 12 h within a 24 h period.  Remove after 12 hours. 30 patch 0     albuterol (ALBUTEROL) 108 (90 BASE) MCG/ACT Inhaler Inhale 1-2 puffs into the lungs daily as needed       oxyCODONE (OXYCONTIN) 10 MG 12 hr tablet Take 1 tablet (10 mg) by mouth At Bedtime maximum 1tablet(s) per day 30 tablet 0     IBUPROFEN  PO Take 1 tablet by mouth every 8 hours as needed        magnesium hydroxide (MOM) 2400 MG/10ML SUSP Take 5 mLs by mouth daily as needed for constipation Reported on 3/2/2017       SENNA CO        Calcium Carbonate-Vitamin D (OSCAL 500/200 D-3 PO)        Acetaminophen (TYLENOL PO) Take 500 mg by mouth daily as needed        levETIRAcetam (KEPPRA) 100 MG/ML solution Take 10 mg/kg by mouth 2 times daily       FOLIC ACID PO Take 800 mcg by mouth daily       Past Medical History:   Diagnosis Date     Lung cancer (H)     adenocarcinoma, metastatic to brain, bone     Sleep apnea     CPAP     Past Surgical History:   Procedure Laterality Date     BIOPSY  8/8/2016    bronchoscopy FNA lymph node, hilar  node, paratracheal node     CRANIOTOMY  7/27/2016    left frontal craniotomy     No family history on file.    Attending Physician Attestation    Patient seen & evaluated with Dr Cervantes. I agree with and confirm leroy findings in her note. Pt presents to clinic today with acute on chronic low back pain, now with some hip flexor weakness on the left.  Will add on lumbar MRI to evaluate for metastatic nerve root impingement.  No bowel or bladder dysfunction.  Reviewed with Dr. Bartholomew and team, they will plan to f/u on results from both the thoracic and lumbar MRI's.  Will escalate pain medications as noted above.     Marlena Vega MD / Palliative Medicine / Pager [phone] / After-Hours Answering Service [phone] / Main Palliative Clinic - [facility] [phone] / [facility] Inpatient Team Consult Pager [phone] (answered 8am-430pm M-F)

## 2017-04-06 NOTE — PATIENT INSTRUCTIONS
Please use 75 mcg fentanyl (50 mcg + 25 mcg patch)  Use 10-20 mg oxycodone (2-4 tabs) every 3 hours as needed for pain  Take gabapentin as prescribed for pain  We will call with MRI results if there acute concerns. If no acute concerns with the MRI, I will refer to intervention pain for consideration of a back injection    Please come back in 4 weeks  We are happy to refill your pain medications moving foward

## 2017-04-10 NOTE — PROGRESS NOTES
Pavan Shin,   He has multiple areas of metastasis in lumbar spine which are getting worse. I will call him today. Please have him see radiation oncology and spine surgery.   Please also have him start dexamethasone 4 mg TID.    Thanks

## 2017-04-10 NOTE — TELEPHONE ENCOUNTER
Valerio called clinic to change his appts this week- he is not able to get to clinic until 1pm.  Scheduling will move his [facility] visit and chemo this week to accommodate.    I asked Valerio if he had talked with Dr. Bartholomew, and he stated no.  I reviewed that Dr. Bartholomew was attempting to get in touch with him to review his MRI results.  But that due to the increase pain he is experiencing that we want to have him take Dexamethasone 4mg TID.  Valerio has some at home and will try to get two doses in today.    I let Valerio know that we would also like to see if Radiation could help with pain management, and that I would follow up on the spine surgery referral from his previous visit.   We will see Valerio in clinic tomorrow 4/11 and review the above appts.

## 2017-04-11 NOTE — NURSING NOTE
Patient is here today for labs to be drawn, however no opens are scheduled labs placed. Vitals charted and patient checked into next visit.

## 2017-04-11 NOTE — PROGRESS NOTES
[facility] PHYSICIANS  HEMATOLOGY ONCOLOGY    ONCOLOGY FOLLOWUP NOTE      DIAGNOSIS:  Stage IV non-small cell lung cancer.  Initially presented in 07/2016 with a brain mass, which was resected and was consistent with metastatic adenocarcinoma of the lung.  Negative EGFR and ALK rearrangement studies. CT scan in 07/2016 did not have any definitive mass in the lung.  PET/CT 08/2015 showed 1 cm mass in the left lower lobe.  [facility] with [facility].       TREATMENT:  He proceeded with cisplatin and Alimta for 6 cycles after stereotactic radiation to resection bed and started chemotherapy 09/19/2016.  Imaging after 2 cycles of chemotherapy were consistent with metastatic disease in the bone.  He was started on Zometa and has had 3 cycles of adjuvant cisplatin and Alimta.     [id number] with Nivolumab first He proceeded with cisplatin and Alimta for 6 cycles after stereotactic radiation to resection bed and started chemotherapy 09/19/2016.  Imaging after 2 cycles of chemotherapy were consistent with metastatic disease in the bone.  He was started on Zometa and has had 3 cycles of adjuvant cisplatin and Alimta.treatment 11/23/16. Progression on scan 2/6/17.  2/7/2017 Txotere, completed 2 cycles.   3/21/2017 Vinorelbine/gemcitabine     SUBJECTIVE:  The patient was seen as a followup today. He has left leg weakness and numbness. He is on steroids. His wife and mother came along.     REVIEW OF SYSTEMS:  A complete review of systems was performed and found to be negative other than pertinent positives mentioned in history of present illness.     Past medical, social histories reviewed.    Meds- Reviewed.     PHYSICAL EXAMINATION:   VITAL SIGNS: /90  Pulse 118  Temp 97.9  F (36.6  C)  Wt 104.8 kg (231 lb)  SpO2 95%  BMI 31.33 kg/m2  GENERAL: Sitting comfortably.   HEENT: Pupils are equal. Oropharynx is clear.   NECK: Right cervical adenopathy is stable.   LUNGS: Clear  bilaterally.   HEART: S1, S2, regular.   ABDOMEN: Soft, nontender, nondistended, no hepatosplenomegaly.   EXTREMITIES: Warm, well perfused.   NEUROLOGIC: Alert, awake.   SKIN:No rash  LYMPHATICS: No edema.      LABORATORY DATA:    Recent Labs   Lab Test  03/21/17   0855  03/02/17   0921  01/31/17   0957  01/24/17   1321   NA  136   --   134  134   POTASSIUM  4.8   --   4.2  4.4   CHLORIDE  103   --   100  100   CO2  25   --   26  26   ANIONGAP  9   --   8  8   BUN  33*   --   13  16   CR  0.63*  0.63*  0.66  0.66   GLC  92   --   95  61*   ROSENDO  9.0  9.0  9.1  8.9   MAG   --    --   1.9  2.3   PHOS   --    --   3.9  4.0     Recent Labs   Lab Test  03/30/17   1427  03/21/17   0855  03/02/17   0921   WBC  5.8  20.1*  7.3   HGB  8.6*  9.8*  8.5*   PLT  156  341  395   MCV  84  84  81   NEUTROPHIL  88.0  83.2  83.4     Recent Labs   Lab Test  03/21/17   0855  03/02/17   0921  02/09/17   1006  01/31/17   0957  01/24/17   1321  01/17/17   0830   BILITOTAL  0.3  0.3  0.2  0.3  0.3  0.3   ALKPHOS  107  69  66  58  60  61   ALT  32  13  18  17  17  18   AST  31  20  22  20  22  21   ALBUMIN  2.9*  3.0*  3.3*  3.0*  3.1*  3.2*   LDH   --    --    --   402*  489*  407*         Results for orders placed or performed during the hospital encounter of 04/07/17   MR Thoracic Spine w/o & w Contrast    Narrative    Thoracic and Lumbar spine MRI without and with contrast    History: please evaluate for metastatic disease, Lumbago with  sciatica, left side.    Comparison:    Technique:  Axial T2-weighted, and sagittal T1 and T2-weighted images  of the lumbar spine without intravenous contrast. Sagittal T1 and  T2-weighted images of the thoracic spine without contrast and post  intravenous contrast  (using gadolinium) sagittal T1-weighted images  were obtained with fat saturation and axial T1-weighted images with  fat-saturation through selected areas of interest.  Contrast: Gadolinium-based intravenous contrast    Findings:  Thoracic  Spine:  The external marker is at the level of T11. Notably,  on  images, there is diffuse replacement of numerous cervical  vertebra, regarding the vertebral marrow signal, as on the prior  examination, although there is not a dedicated cervical MRI  examination on today's study. The tip of the conus medullaris is at  approximately T12-L1. A depressed T5 vertebra is new compared to the  prior examination which previously had abnormal marrow signal, likely  representing a pathologic fracture with internal edema. While there  were diffuse metastatic lesions in the prior examination on STIR  images with edema and enhancement, these have increased, and now  replaces nearly the entire T1, T3, T5, T7, T8, T9, T10, T11, and T12  vertebra, whereas T8-T10 were not entirely replaced previously. There  is corresponding abnormal enhancement of these vertebra nearly  entirely as well, with abnormal diffusion imaging.  There is no definite abnormal signal in the thoracic spinal cord at  any level. Alignment of the thoracic vertebra appears within normal  limits, although there is increasing kyphosis. Regarding spinal canal  patency, there is epidural involvement that is new at the level  posteriorly at the level of T5 with contrast enhancement, which mild  to moderately displaces the spinal cord to the right of midline; this  lesion measures 1.1 x 0.5 x 1.5 cm without evidence of epidural  involvement previously. There are also transverse process and rib  lesions at several levels, as well as paraspinous enhancing tumor at  several levels as well, most notably at the level of T7-8 on the right  partially involving the right neural foramen and at T8-9, and on the  right at the thoracic-lumbar junction between T11 down to L1 which may  extend into the anterior epidural space and right transverse processes  at those levels. Additionally, at the level of T2-3, there is also  apparent anterior epidural involvement as well with  mild spinal canal  stenosis.    Lumbar Spine:  There are 5 type lumbar vertebra, used for the purposes of this  dictation.  The alignment of the lumbar spine is unremarkable.   As  far as the bone marrow signal intensity, there has been worsening of  the diffuse metastatic lesions in the lumbar spine, as was the case in  the thoracic spine. Likely adrenal mass is also noted bilaterally at  the top of the images. Post-contrast images demonstrate that there is  increasing wedging of the L2 vertebra with now about 30% loss of  vertebral body height, replacement of nearly all of the marrow signal  on STIR images by enhancing lesions and edema, and epidural  involvement at the following levels: On the left at T12-L1, almost  circumferentially causing moderate spinal canal stenosis at the level  of L1-2, moderate to severe spinal canal stenosis at L2-3, a large  paraspinous mass greater than 9 cm maximum axial diameter at the level  of L2-L3 on the left with left neural foraminal involvement, and  numerous sacroiliac metastases that are new or worsened.  All of the above-mentioned findings are new or worsened compared to  prior examinations, in particularly the neural foraminal involvement  on the left at L2-3 with the spinal canal stenosis at that level.      Impression    Impression:   1. Thoracic spine: Worsening diffuse metastatic lesions, with new  pathologic compression deformity of T5, and new epidural involvement  at the 2 levels described above in particular, with spinal canal  stenosis due to the epidural involvement at approximately the T5  level. Numerous rib and paraspinous lesions also identified, most  notably with right neural foraminal involvement at the level of T8-9.   2. Lumbar spine: Worsening diffuse metastatic lesions, with new  pathologic compression deformity of L2, multilevel spinal canal and  epidural narrowing (most notably the T12-L1 neural foraminal and  epidural space involvement with also  circumferential involvement  causing moderate spinal canal stenosis at the level of L1 and L2-3),  and a large new paraspinous mass (over 9 cm maximum diameter) at the  level of L2-3 that is contiguous with epidural involvement, spinal  canal stenosis and bony lesions.   3. Large adrenal masses bilaterally; recommend correlation with recent  abdomen and pelvis CT scans.  4. Numerous new or enlarged iliac and sacral metastases as well.     PETE SMITH MD       ECOG performance status 1.      ASSESSMENT:   A 41-year-old gentleman with stage IV adenocarcinoma of the lung, initially presented with a left frontal brain mass which was resected and had radiation, eventually was diagnosed with bone metastases after 2 cycles of adjuvant cisplatin and pemetrexed and has had 3 cycles of cisplatin and pemetrexed and was started on ometa as well.   He is on [id number] trial which is testing nivolumab in combination with an IL-15 super agonist.    PET scan was performed 12/23/16 showed progression at multiple sites. He had palliative radiation to painful bone metastasis.   He was screened for MIRATI and STARTRK study as well. He was not a candidate for Mirati and very less likely to be candidate for STARTRK.  PET scan and images from 2/6/17- progression at multiple sites.  CT scan 3/20/17 showed progression at multiple sites including new vertebral fracture. His recent MRI showed new brain metastasi  We switched chemotherapy to combination of gemcitabine and vinorelbine.  - MRI of spine 4/7/17 has shown multiple areas of disease progression and fracture. He has left leg numbness and weakness. He is to see radiation oncology and spine surgery.   - I had a detailed discussion with him and his family. He has continued disease progression through multiple treatment options. I think it is reasonable to give current chemotherapy some more time. The other option may include Topotecan with Avastin. I suspect he has short survival due  to refractory cancer. He was introduced to the concept of hospice care.     PLAN:  Continue chemotherapy  Radiation oncology this week  Epidural injection lumbar spine  RTC MD first week of May  EZEQUIEL MORGAN MD    4/11/2017

## 2017-04-11 NOTE — NURSING NOTE
Naren Kimble is a 42 year old male who presents for:  Chief Complaint   Patient presents with     Oncology Clinic Visit     lung cancer        Initial Vitals:  /90  Pulse 118  Temp 97.9  F (36.6  C)  Wt 104.8 kg (231 lb)  SpO2 95%  BMI 31.33 kg/m2 Estimated body mass index is 31.33 kg/(m^2) as calculated from the following:    Height as of 4/6/17: 1.829 m (6').    Weight as of this encounter: 104.8 kg (231 lb).. Body surface area is 2.31 meters squared. BP completed using cuff size: NA (Not Taken)  Severe Pain (7) No LMP for male patient. Allergies and medications reviewed.     Medications: MEDICATION REFILLS NEEDED TODAY.  Pharmacy name entered into [employer]:    [facility] [place], [place] - [address] [place] [address] [address] [place] [address] DRUG STORE [address] [place] [address] AT [address]    Comments: Patient is at a pain level of 7 today, L leg pain.     6 minutes for nursing intake (face to face time)   Isaura Romero, [facility]

## 2017-04-11 NOTE — MR AVS SNAPSHOT
After Visit Summary   4/11/2017    Naren Kimble    MRN: [id number]           Patient Information     Date Of Birth          1975        Visit Information        Provider Department      4/11/2017 1:30 PM Nenita Bartholomew MD [facility]        Today's Diagnoses     Non-small cell lung cancer (NSCLC) (H)    -  1    Left-sided low back pain without sciatica, unspecified chronicity          Care Instructions    Continue chemotherapy  Radiation oncology this week  Epidural injection lumbar spine  RTC MD first week of May        Follow-ups after your visit        Your next 10 appointments already scheduled     Apr 18, 2017  2:00 PM CDT   EXTERNAL RADIATION TREATMENT with [facility] RAD ONC ELEKTA   Radiation Oncology Clinic ([facility])    [facility]  1st Floor  [address] [facility] [address]   [phone]            Apr 19, 2017  2:00 PM CDT   EXTERNAL RADIATION TREATMENT with [facility] RAD ONC ELEKTA   Radiation Oncology Clinic ([facility])    [facility]  1st Floor  [address] [facility] [address]   [phone]            Apr 20, 2017  2:00 PM CDT   EXTERNAL RADIATION TREATMENT with [facility] RAD ONC ELEKTA   Radiation Oncology Clinic ([facility])    [facility]  1st Floor  [address] [facility] [address]   [phone]            Apr 20, 2017  2:15 PM CDT   ON TREATMENT VISIT with Eden Kovacs MD   Radiation Oncology Clinic ([facility])    [facility]  1st Floor  [address] [facility] [address]   [phone]            Apr 21, 2017  2:00 PM CDT   EXTERNAL RADIATION TREATMENT with [facility] RAD ONC ELEKTA   Radiation Oncology Clinic ([facility])    [facility]  1st Floor  [address] [facility] [address]   [phone]            Apr 24, 2017  2:00 PM CDT   EXTERNAL  RADIATION TREATMENT with [facility] RAD ONC ELEKTA   Radiation Oncology Clinic ([facility])    [facility]  1st Floor  [address] [facility] [address]   [phone]            Apr 25, 2017  2:00 PM CDT   EXTERNAL RADIATION TREATMENT with [facility] RAD ONC ELEKTA   Radiation Oncology Clinic ([facility])    [facility]  1st Floor  [address] [facility] [address]   [phone]            May 04, 2017  9:00 AM CDT   [facility] Lab Draw with  [facility] DRAW   [facility] Lab Draw ([facility])    [address] [facility]  2nd [facility] [address] [phone]            May 04, 2017  9:30 AM CDT   (Arrive by 9:15 AM)   Return Visit with DANIA Stallworth CNP   [facility] ([facility])    [address] [id number] [phone]            May 04, 2017 12:45 PM CDT   (Arrive by 12:30 PM)   Return Visit with Tracy Cervantes DO   [facility] ([facility])    [address] [place] [id number] [phone]              Who to contact     If you have questions or need follow up information about today's clinic visit or your schedule please contact [facility] directly at [phone].  Normal or non-critical lab and imaging results will be communicated to you by MyChart, letter or phone within 4 business days after the clinic has received the results. If you do not hear from us within 7 days, please contact the clinic through MyChart or phone. If you have a critical or abnormal lab result, we will notify you by phone as soon as possible.  Submit refill requests through [employer] or call your pharmacy and they will forward the refill request to us. Please allow 3 business days for your refill to be completed.          Additional Information  About Your Visit        [employer] Information     T[employer] gives you secure access to your electronic health record. If you see a primary care provider, you can also send messages to your care team and make appointments. If you have questions, please call your primary care clinic.  If you do not have a primary care provider, please call [phone] and they will assist you.        Care EveryWhere ID     This is your Care EveryWhere ID. This could be used by other organizations to access your [facility] medical records  [id number]        Your Vitals Were     Pulse Temperature Pulse Oximetry BMI (Body Mass Index)          118 97.9  F (36.6  C) 95% 31.33 kg/m2         Blood Pressure from Last 3 Encounters:   04/17/17 103/66   04/14/17 (!) 150/91   04/13/17 (!) 147/96    Weight from Last 3 Encounters:   04/17/17 100.6 kg (221 lb 11.2 oz)   04/13/17 105.7 kg (233 lb)   04/11/17 104.8 kg (231 lb)                 Where to get your medicines      Some of these will need a paper prescription and others can be bought over the counter.  Ask your nurse if you have questions.     Bring a paper prescription for each of these medications     oxyCODONE 5 MG capsule          Primary Care Provider    None Specified       No primary provider on file.        Thank you!     Thank you for choosing [facility] CLINIC  for your care. Our goal is always to provide you with excellent care. Hearing back from our patients is one way we can continue to improve our services. Please take a few minutes to complete the written survey that you may receive in the mail after your visit with us. Thank you!             Your Updated Medication List - Protect others around you: Learn how to safely use, store and throw away your medicines at www.disposemymeds.org.          This list is accurate as of: 4/11/17 11:59 PM.  Always use your most recent med list.                   Brand Name Dispense Instructions for use    albuterol 108 (90 BASE)  MCG/ACT Inhaler   Generic drug:  albuterol      Inhale 1-2 puffs into the lungs daily as needed       dexamethasone 4 MG tablet    DECADRON    60 tablet    Take 1 tablet (4 mg) by mouth 2 times daily (with meals)       * fentaNYL 50 mcg/hr 72 hr patch    DURAGESIC    10 patch    Place 1 patch onto the skin every 72 hours       * fentaNYL 25 mcg/hr 72 hr patch    DURAGESIC    10 patch    Place 1 patch onto the skin every 72 hours Please use with 50 mcg patch (total of 75 mcg)       FOLIC ACID PO      Take 800 mcg by mouth daily       gabapentin 300 MG capsule    NEURONTIN    90 capsule    Take 1 capsule (300 mg) by mouth 3 times daily Start with 300 mg at night for first 2 nights, then increase to prescribed dose       IBUPROFEN PO      Take 1 tablet by mouth every 8 hours as needed       levETIRAcetam 100 MG/ML solution    KEPPRA     Take 10 mg/kg by mouth 2 times daily       lidocaine 5 % Patch    LIDODERM    30 patch    Apply up to 3 patches to painful area at once for up to 12 h within a 24 h period.  Remove after 12 hours.       LORazepam 0.5 MG tablet    ATIVAN    30 tablet    Take 1 tablet (0.5 mg) by mouth every 4 hours as needed (Anxiety, Nausea/Vomiting or Sleep)       magnesium hydroxide 2400 MG/10ML Susp    MOM     Take 5 mLs by mouth daily as needed for constipation Reported on 3/2/2017       ondansetron 8 MG tablet    ZOFRAN    90 tablet    Take 1 tablet (8 mg) by mouth every 8 hours as needed       order for DME     1 Units    Equipment being ordered: Wheelchair       OSCAL 500/200 D-3 PO          oxyCODONE 5 MG capsule    OXY-IR    200 capsule    Take 1-3 capsules (5-15 mg) by mouth every 4 hours as needed for moderate to severe pain       pantoprazole 40 MG EC tablet    PROTONIX    30 tablet    Take 1 tablet (40 mg) by mouth daily       prochlorperazine 10 MG tablet    COMPAZINE    30 tablet    Take 1 tablet (10 mg) by mouth every 6 hours as needed (Nausea/Vomiting)       SENNA CO          TYLENOL PO       Take 500 mg by mouth daily as needed       * Notice:  This list has 2 medication(s) that are the same as other medications prescribed for you. Read the directions carefully, and ask your doctor or other care provider to review them with you.

## 2017-04-11 NOTE — LETTER
4/11/2017       RE: Naren Kimble  [address]  [place] [address]     Dear Colleague,    Thank you for referring your patient, Naren Kimble, to the [facility]. Please see a copy of my visit note below.    [facility]  HEMATOLOGY ONCOLOGY    ONCOLOGY FOLLOWUP NOTE      DIAGNOSIS:  Stage IV non-small cell lung cancer.  Initially presented in 07/2016 with a brain mass, which was resected and was consistent with metastatic adenocarcinoma of the lung.  Negative EGFR and ALK rearrangement studies. CT scan in 07/2016 did not have any definitive mass in the lung.  PET/CT 08/2015 showed 1 cm mass in the left lower lobe.  [facility].       TREATMENT:  He proceeded with cisplatin and Alimta for 6 cycles after stereotactic radiation to resection bed and started chemotherapy 09/19/2016.  Imaging after 2 cycles of chemotherapy were consistent with metastatic disease in the bone.  He was started on Zometa and has had 3 cycles of adjuvant cisplatin and Alimta.     XHA285 with Nivolumab first He proceeded with cisplatin and Alimta for 6 cycles after stereotactic radiation to resection bed and started chemotherapy 09/19/2016.  Imaging after 2 cycles of chemotherapy were consistent with metastatic disease in the bone.  He was started on Zometa and has had 3 cycles of adjuvant cisplatin and Alimta.treatment 11/23/16. Progression on scan 2/6/17.  2/7/2017 Txotere, completed 2 cycles.   3/21/2017 Vinorelbine/gemcitabine     SUBJECTIVE:  The patient was seen as a followup today. He has left leg weakness and numbness. He is on steroids. His wife and mother came along.     REVIEW OF SYSTEMS:  A complete review of systems was performed and found to be negative other than pertinent positives mentioned in history of present illness.     Past medical, social histories reviewed.    Meds- Reviewed.     PHYSICAL EXAMINATION:   VITAL SIGNS: BP  135/90  Pulse 118  Temp 97.9  F (36.6  C)  Wt 104.8 kg (231 lb)  SpO2 95%  BMI 31.33 kg/m2  GENERAL: Sitting comfortably.   HEENT: Pupils are equal. Oropharynx is clear.   NECK: Right cervical adenopathy is stable.   LUNGS: Clear bilaterally.   HEART: S1, S2, regular.   ABDOMEN: Soft, nontender, nondistended, no hepatosplenomegaly.   EXTREMITIES: Warm, well perfused.   NEUROLOGIC: Alert, awake.   SKIN:No rash  LYMPHATICS: No edema.      LABORATORY DATA:    Recent Labs   Lab Test  03/21/17   0855  03/02/17   0921  01/31/17   0957  01/24/17   1321   NA  136   --   134  134   POTASSIUM  4.8   --   4.2  4.4   CHLORIDE  103   --   100  100   CO2  25   --   26  26   ANIONGAP  9   --   8  8   BUN  33*   --   13  16   CR  0.63*  0.63*  0.66  0.66   GLC  92   --   95  61*   ROSENDO  9.0  9.0  9.1  8.9   MAG   --    --   1.9  2.3   PHOS   --    --   3.9  4.0     Recent Labs   Lab Test  03/30/17   1427  03/21/17   0855  03/02/17   0921   WBC  5.8  20.1*  7.3   HGB  8.6*  9.8*  8.5*   PLT  156  341  395   MCV  84  84  81   NEUTROPHIL  88.0  83.2  83.4     Recent Labs   Lab Test  03/21/17   0855  03/02/17   0921  02/09/17   1006  01/31/17   0957  01/24/17   1321  01/17/17   0830   BILITOTAL  0.3  0.3  0.2  0.3  0.3  0.3   ALKPHOS  107  69  66  58  60  61   ALT  32  13  18  17  17  18   AST  31  20  22  20  22  21   ALBUMIN  2.9*  3.0*  3.3*  3.0*  3.1*  3.2*   LDH   --    --    --   402*  489*  407*         Results for orders placed or performed during the hospital encounter of 04/07/17   MR Thoracic Spine w/o & w Contrast    Narrative    Thoracic and Lumbar spine MRI without and with contrast    History: please evaluate for metastatic disease, Lumbago with  sciatica, left side.    Comparison:    Technique:  Axial T2-weighted, and sagittal T1 and T2-weighted images  of the lumbar spine without intravenous contrast. Sagittal T1 and  T2-weighted images of the thoracic spine without contrast and post  intravenous contrast  (using  gadolinium) sagittal T1-weighted images  were obtained with fat saturation and axial T1-weighted images with  fat-saturation through selected areas of interest.  Contrast: Gadolinium-based intravenous contrast    Findings:  Thoracic Spine:  The external marker is at the level of T11. Notably,  on  images, there is diffuse replacement of numerous cervical  vertebra, regarding the vertebral marrow signal, as on the prior  examination, although there is not a dedicated cervical MRI  examination on today's study. The tip of the conus medullaris is at  approximately T12-L1. A depressed T5 vertebra is new compared to the  prior examination which previously had abnormal marrow signal, likely  representing a pathologic fracture with internal edema. While there  were diffuse metastatic lesions in the prior examination on STIR  images with edema and enhancement, these have increased, and now  replaces nearly the entire T1, T3, T5, T7, T8, T9, T10, T11, and T12  vertebra, whereas T8-T10 were not entirely replaced previously. There  is corresponding abnormal enhancement of these vertebra nearly  entirely as well, with abnormal diffusion imaging.  There is no definite abnormal signal in the thoracic spinal cord at  any level. Alignment of the thoracic vertebra appears within normal  limits, although there is increasing kyphosis. Regarding spinal canal  patency, there is epidural involvement that is new at the level  posteriorly at the level of T5 with contrast enhancement, which mild  to moderately displaces the spinal cord to the right of midline; this  lesion measures 1.1 x 0.5 x 1.5 cm without evidence of epidural  involvement previously. There are also transverse process and rib  lesions at several levels, as well as paraspinous enhancing tumor at  several levels as well, most notably at the level of T7-8 on the right  partially involving the right neural foramen and at T8-9, and on the  right at the thoracic-lumbar  junction between T11 down to L1 which may  extend into the anterior epidural space and right transverse processes  at those levels. Additionally, at the level of T2-3, there is also  apparent anterior epidural involvement as well with mild spinal canal  stenosis.    Lumbar Spine:  There are 5 type lumbar vertebra, used for the purposes of this  dictation.  The alignment of the lumbar spine is unremarkable.   As  far as the bone marrow signal intensity, there has been worsening of  the diffuse metastatic lesions in the lumbar spine, as was the case in  the thoracic spine. Likely adrenal mass is also noted bilaterally at  the top of the images. Post-contrast images demonstrate that there is  increasing wedging of the L2 vertebra with now about 30% loss of  vertebral body height, replacement of nearly all of the marrow signal  on STIR images by enhancing lesions and edema, and epidural  involvement at the following levels: On the left at T12-L1, almost  circumferentially causing moderate spinal canal stenosis at the level  of L1-2, moderate to severe spinal canal stenosis at L2-3, a large  paraspinous mass greater than 9 cm maximum axial diameter at the level  of L2-L3 on the left with left neural foraminal involvement, and  numerous sacroiliac metastases that are new or worsened.  All of the above-mentioned findings are new or worsened compared to  prior examinations, in particularly the neural foraminal involvement  on the left at L2-3 with the spinal canal stenosis at that level.      Impression    Impression:   1. Thoracic spine: Worsening diffuse metastatic lesions, with new  pathologic compression deformity of T5, and new epidural involvement  at the 2 levels described above in particular, with spinal canal  stenosis due to the epidural involvement at approximately the T5  level. Numerous rib and paraspinous lesions also identified, most  notably with right neural foraminal involvement at the level of T8-9.   2.  Lumbar spine: Worsening diffuse metastatic lesions, with new  pathologic compression deformity of L2, multilevel spinal canal and  epidural narrowing (most notably the T12-L1 neural foraminal and  epidural space involvement with also circumferential involvement  causing moderate spinal canal stenosis at the level of L1 and L2-3),  and a large new paraspinous mass (over 9 cm maximum diameter) at the  level of L2-3 that is contiguous with epidural involvement, spinal  canal stenosis and bony lesions.   3. Large adrenal masses bilaterally; recommend correlation with recent  abdomen and pelvis CT scans.  4. Numerous new or enlarged iliac and sacral metastases as well.     PETE SMITH MD       ECOG performance status 1.      ASSESSMENT:   A 41-year-old gentleman with stage IV adenocarcinoma of the lung, initially presented with a left frontal brain mass which was resected and had radiation, eventually was diagnosed with bone metastases after 2 cycles of adjuvant cisplatin and pemetrexed and has had 3 cycles of cisplatin and pemetrexed and was started on ometa as well.   He is on [id number] trial which is testing nivolumab in combination with an IL-15 super agonist.    PET scan was performed 12/23/16 showed progression at multiple sites. He had palliative radiation to painful bone metastasis.   He was screened for MIRATI and STARTRK study as well. He was not a candidate for Mirati and very less likely to be candidate for STARTRK.  PET scan and images from 2/6/17- progression at multiple sites.  CT scan 3/20/17 showed progression at multiple sites including new vertebral fracture. His recent MRI showed new brain metastasi  We switched chemotherapy to combination of gemcitabine and vinorelbine.  - MRI of spine 4/7/17 has shown multiple areas of disease progression and fracture. He has left leg numbness and weakness. He is to see radiation oncology and spine surgery.   - I had a detailed discussion with him and his  family. He has continued disease progression through multiple treatment options. I think it is reasonable to give current chemotherapy some more time. The other option may include Topotecan with Avastin. I suspect he has short survival due to refractory cancer. He was introduced to the concept of hospice care.     PLAN:  Continue chemotherapy  Radiation oncology this week  Epidural injection lumbar spine  RTC MD first week of May  EZEQUIEL MORGAN MD    4/11/2017

## 2017-04-13 NOTE — TELEPHONE ENCOUNTER
Called patient to check in regarding pain control following appt last week. He tells me that the leg pain has improved some with the addition of gabapentin - he notes he is currently taking 300mg TID. Denies side effects from that dose. He states he did get the new fentanyl patch to wear with his old one for a fentanyl dose increase and this is going well. He states that his back pain and leg pain is now more controlled and he is more comfortable. He states that he is still taking 3 tabs oxycodone (15mg) Q4H while he is awake, he reports he is sleeping find through the night, waking up only once to reposition self. He denies side effects and tells me he is having normal BMs with taking milk of magnesia 2-3 times per week. Reminded patient of palliative contact info for arising needs prior to appointment in a few weeks.

## 2017-04-13 NOTE — MR AVS SNAPSHOT
After Visit Summary   4/13/2017    Naren Kimble    MRN: [id number]           Patient Information     Date Of Birth          1975        Visit Information        Provider Department      4/13/2017 4:00 PM  12 ATC;  ONCOLOGY INFUSION [facility]        Today's Diagnoses     Non-small cell lung cancer (NSCLC) (H)    -  1      Care Instructions    Contact Numbers    [facility] Main Line: [phone]  [facility] Triage:  [phone]    Call triage with chills and/or temperature greater than or equal to 100.5, uncontrolled nausea/vomiting, diarrhea, constipation, dizziness, shortness of breath, chest pain, bleeding, unexplained bruising, or any new/concerning symptoms, questions/concerns.     If you are having any concerning symptoms or wish to speak to a provider before your next infusion visit, please call your care coordinator or triage to notify them so we can adequately serve you.       After Hours: [phone]    If after hours, weekends, or holidays, call main hospital  and ask for Oncology doctor on call.           April 2017 Sunday Monday Tuesday Wednesday Thursday Friday Saturday                                 1       2     3     4     5     6     UMP NEW   12:30 PM   (75 min.)   Tracy Cervantes DO   [facility] 7     MR LUMBAR SPINE WWO    4:00 PM   (60 min.)   [address], [facility]     MR THORACIC SPINE WO    5:00 PM   (60 min.)   [address], [facility] 8       9     10     11     P [facility] DRAW    1:00 PM   (15 min.)    [facility] DRAW   [facility] Lab Draw     UMP RETURN    1:15 PM   (30 min.)   Nenita Bartholomew MD   [facility] 12     13     XR LUM/SAC TRANSFOR RUSS SINGLE    2:30 PM   (30 min.)   UCXR3   [facility] Xray     [facility] DRAW    3:30 PM   (15 min.)    [facility] DRAW   [facility] Lab Draw     [facility] ONC INFUSION 60    4:00 PM   (60 min.)    ONCOLOGY INFUSION   M  [facility] 14     UMP ONC INFUSION 360    9:00 AM   (360 min.)   UC ONCOLOGY INFUSION   M [facility] 15       16     17     UMP RETURN    2:00 PM   (60 min.)   Eden Kovacs MD   [facility] 18     19     20     21     22       23     24     25     26     27     28     29       30                                              May 2017   Jayce Monday Tuesday Wednesday Thursday Friday Saturday        1     2     3     4     [facility] DRAW    9:00 AM   (15 min.)   [facility] DRAW   [facility] Lab Draw     UMP RETURN    9:15 AM   (50 min.)   Yulia Lepe APRN CNP   [facility]     UMP ONC INFUSION 60   11:00 AM   (60 min.)   UC ONCOLOGY INFUSION   [facility]     UMP RETURN   12:30 PM   (75 min.)   Tracy Cervantes DO M [facility] 5     6       7     8     9     10     11     12     13       14     15     16     17     18     19     20       21     22     23     24     25     UMP ONC INFUSION 60    9:30 AM   (60 min.)   UC ONCOLOGY INFUSION   [facility] 26     27       28     29     30     31                                Recent Results (from the past 24 hour(s))   CBC with platelets differential    Collection Time: 04/13/17  3:36 PM   Result Value Ref Range    WBC 10.7 4.0 - 11.0 10e9/L    RBC Count 2.44 (L) 4.4 - 5.9 10e12/L    Hemoglobin 6.4 (LL) 13.3 - 17.7 g/dL    Hematocrit 21.6 (L) 40.0 - 53.0 %    MCV 89 78 - 100 fl    MCH 26.2 (L) 26.5 - 33.0 pg    MCHC 29.6 (L) 31.5 - 36.5 g/dL    RDW 21.8 (H) 10.0 - 15.0 %    Platelet Count 395 150 - 450 10e9/L    Diff Method Manual Differential     % Neutrophils 87.7 %    % Lymphocytes 3.5 %    % Monocytes 8.8 %    % Eosinophils 0.0 %    % Basophils 0.0 %    Nucleated RBCs 1 (H) 0 /100    Absolute Neutrophil 9.4 (H) 1.6 - 8.3 10e9/L    Absolute Lymphocytes 0.4 (L) 0.8 - 5.3 10e9/L    Absolute Monocytes 0.9 0.0 - 1.3  10e9/L    Absolute Eosinophils 0.0 0.0 - 0.7 10e9/L    Absolute Basophils 0.0 0.0 - 0.2 10e9/L    Absolute Nucleated RBC 0.1     Anisocytosis Moderate     Poikilocytosis Slight     Polychromasia Moderate     Ovalocytes Slight    Bilirubin  total    Collection Time: 04/13/17  3:36 PM   Result Value Ref Range    Bilirubin Total 0.6 0.2 - 1.3 mg/dL   ABO/Rh type and screen    Collection Time: 04/13/17  3:41 PM   Result Value Ref Range    ABO A     RH(D)  Pos     Antibody Screen Neg     Test Valid Only At       [facility]    Specimen Expires 04/16/2017     Blood Bank Comment       This specimen was not labeled according to requirements for establishing a blood   type for transfusion purposes.  The patient can be transfused with type O red   cells until a new specimen is obtained to confirm the patient's blood type, at   which time type-specific blood can be transfused.                 Follow-ups after your visit        Your next 10 appointments already scheduled     Apr 14, 2017  9:00 AM CDT   Infusion 360 with  ONCOLOGY INFUSION, UC 10 ATC   [facility] ([facility])    [address] [place] [id number]   [phone]            Apr 17, 2017  2:00 PM CDT   Return Visit with Eden Kovacs MD   [facility] ([facility])    [facility]  1st Floor  [address] [facility] [address]   [phone]            May 04, 2017  9:00 AM CDT   [facility] Draw with [facility] LAB DRAW   [facility] Lab Draw ([facility])    [address] [place] [address]   [phone]            May 04, 2017  9:30 AM CDT   (Arrive by 9:15 AM)   Return Visit with DANIA Stallworth CNP   [facility] ([facility])    [address] [place] [address]  [address]   [phone]            May 04, 2017 11:00 AM CDT   Infusion 60 with UC ONCOLOGY INFUSION, UC 29 ATC   [facility] ([facility])    [address] [place] [id number] [phone]            May 04, 2017 12:45 PM CDT   (Arrive by 12:30 PM)   Return Visit with Tracy Cervantes DO   [facility] ([facility])    [address] [place] [id number] [phone]            May 25, 2017  9:30 AM CDT   Infusion 60 with UC ONCOLOGY INFUSION, UC 21 ATC   [facility] ([facility])    [address] [place] [id number] [phone]              Future tests that were ordered for you today     Open Standing Orders        Priority Remaining Interval Expires Ordered    Red blood cell prepare order unit Routine 99/100 CONDITIONAL (SPECIFY) BLOOD  4/13/2017            Who to contact     If you have questions or need follow up information about today's clinic visit or your schedule please contact [facility] directly at [phone].  Normal or non-critical lab and imaging results will be communicated to you by [employer], letter or phone within 4 business days after the clinic has received the results. If you do not hear from us within 7 days, please contact the clinic through [employer] or phone. If you have a critical or abnormal lab result, we will notify you by phone as soon as possible.  Submit refill requests through [employer] or call your pharmacy and they will forward the refill request to us. Please allow 3 business days for your refill to be completed.          Additional Information About Your Visit        [employer] Information     [employer] gives you secure access to your electronic health record. If you see a primary care provider, you can also send messages to your care team and make  appointments. If you have questions, please call your primary care clinic.  If you do not have a primary care provider, please call [phone] and they will assist you.        Care EveryWhere ID     This is your Care EveryWhere ID. This could be used by other organizations to access your [place] medical records  [id number]        Your Vitals Were     Pulse Temperature Pulse Oximetry BMI (Body Mass Index)          106 98  F (36.7  C) 100% 31.6 kg/m2         Blood Pressure from Last 3 Encounters:   04/13/17 (!) 147/96   04/13/17 149/82   04/11/17 135/90    Weight from Last 3 Encounters:   04/13/17 105.7 kg (233 lb)   04/11/17 104.8 kg (231 lb)   04/06/17 110.8 kg (244 lb 4.8 oz)              We Performed the Following     ABO/Rh type and screen     Bilirubin  total     CBC with platelets differential        Primary Care Provider    None Specified       No primary provider on file.        Thank you!     Thank you for choosing [facility]  for your care. Our goal is always to provide you with excellent care. Hearing back from our patients is one way we can continue to improve our services. Please take a few minutes to complete the written survey that you may receive in the mail after your visit with us. Thank you!             Your Updated Medication List - Protect others around you: Learn how to safely use, store and throw away your medicines at www.disposemymeds.org.          This list is accurate as of: 4/13/17  6:49 PM.  Always use your most recent med list.                   Brand Name Dispense Instructions for use    albuterol 108 (90 BASE) MCG/ACT Inhaler   Generic drug:  albuterol      Inhale 1-2 puffs into the lungs daily as needed       cyclobenzaprine 10 MG tablet    FLEXERIL    90 tablet    Take 1 tablet (10 mg) by mouth 3 times daily as needed for muscle spasms       dexamethasone 4 MG tablet    DECADRON    60 tablet    Take 1 tablet (4 mg) by mouth 2 times daily (with meals)       *  fentaNYL 50 mcg/hr 72 hr patch    DURAGESIC    10 patch    Place 1 patch onto the skin every 72 hours       * fentaNYL 25 mcg/hr 72 hr patch    DURAGESIC    10 patch    Place 1 patch onto the skin every 72 hours Please use with 50 mcg patch (total of 75 mcg)       FOLIC ACID PO      Take 800 mcg by mouth daily       gabapentin 300 MG capsule    NEURONTIN    90 capsule    Take 1 capsule (300 mg) by mouth 3 times daily Start with 300 mg at night for first 2 nights, then increase to prescribed dose       IBUPROFEN PO      Take 1 tablet by mouth every 8 hours as needed       levETIRAcetam 100 MG/ML solution    KEPPRA     Take 10 mg/kg by mouth 2 times daily       lidocaine 5 % Patch    LIDODERM    30 patch    Apply up to 3 patches to painful area at once for up to 12 h within a 24 h period.  Remove after 12 hours.       LORazepam 0.5 MG tablet    ATIVAN    30 tablet    Take 1 tablet (0.5 mg) by mouth every 4 hours as needed (Anxiety, Nausea/Vomiting or Sleep)       magnesium hydroxide 2400 MG/10ML Susp    MOM     Take 5 mLs by mouth daily as needed for constipation Reported on 3/2/2017       ondansetron 8 MG tablet    ZOFRAN    90 tablet    Take 1 tablet (8 mg) by mouth every 8 hours as needed       order for [facility]     1 Units    Equipment being ordered: Wheelchair       OSCAL 500/200 D-3 PO          * oxyCODONE 10 MG 12 hr tablet    OxyCONTIN    30 tablet    Take 1 tablet (10 mg) by mouth At Bedtime maximum 1tablet(s) per day       * oxyCODONE 5 MG capsule    OXY-IR    200 capsule    Take 1-3 capsules (5-15 mg) by mouth every 4 hours as needed for moderate to severe pain       pantoprazole 40 MG EC tablet    PROTONIX    30 tablet    Take 1 tablet (40 mg) by mouth daily       prochlorperazine 10 MG tablet    COMPAZINE    30 tablet    Take 1 tablet (10 mg) by mouth every 6 hours as needed (Nausea/Vomiting)       SENNA CO          TYLENOL PO      Take 500 mg by mouth daily as needed       * Notice:  This list has 4  medication(s) that are the same as other medications prescribed for you. Read the directions carefully, and ask your doctor or other care provider to review them with you.

## 2017-04-13 NOTE — PATIENT INSTRUCTIONS
Contact Numbers    [facility] [place]: [phone]  [facility] Triage:  [phone]    Call triage with chills and/or temperature greater than or equal to 100.5, uncontrolled nausea/vomiting, diarrhea, constipation, dizziness, shortness of breath, chest pain, bleeding, unexplained bruising, or any new/concerning symptoms, questions/concerns.     If you are having any concerning symptoms or wish to speak to a provider before your next infusion visit, please call your care coordinator or triage to notify them so we can adequately serve you.       After Hours: [phone]    If after hours, weekends, or holidays, call main hospital  and ask for Oncology doctor on call.           April 2017 Sunday Monday Tuesday Wednesday Thursday Friday Saturday                                 1       2     3     4     5     6     UMP NEW   12:30 PM   (75 min.)   Tracy Cervantes DO   [facility] 7     MR LUMBAR SPINE WWO    4:00 PM   (60 min.)   UUMR2   [facility], [facility]     MR THORACIC SPINE WO    5:00 PM   (60 min.)   UUMR2   [facility], [facility] 8       9     10     11     P [facility] DRAW    1:00 PM   (15 min.)    [facility] LAB DRAW   [facility] Lab Draw     UMP RETURN    1:15 PM   (30 min.)   Nenita Bartholomew MD   [facility] 12     13     XR LUM/SAC TRANSFOR RUSS SINGLE    2:30 PM   (30 min.)   UCXR3   [facility] Xray     P [facility] LAB DRAW    3:30 PM   (15 min.)    [facility] LAB DRAW   [facility] Lab Draw     P ONC INFUSION 60    4:00 PM   (60 min.)   UC ONCOLOGY INFUSION   [facility] 14     UMP ONC INFUSION 360    9:00 AM   (360 min.)   UC ONCOLOGY INFUSION   [facility] 15       16     17     UMP RETURN    2:00 PM   (60 min.)   Eden Kovacs MD   [facility] 18     19     20     21     22       23     24     25     26     27     28     29       30                                              May  2017   Sunday Monday Tuesday Wednesday Thursday Friday Saturday        1     2     3     4     [facility] DRAW    9:00 AM   (15 min.)   [facility] LAB DRAW   [facility] Lab Draw     UMP RETURN    9:15 AM   (50 min.)   Yulia Lepe APRN CNP   [facility]     UMP ONC INFUSION 60   11:00 AM   (60 min.)   UC ONCOLOGY INFUSION   [facility]     UMP RETURN   12:30 PM   (75 min.)   Tracy Cervantes DO   [facility] 5     6       7     8     9     10     11     12     13       14     15     16     17     18     19     20       21     22     23     24     25     UMP ONC INFUSION 60    9:30 AM   (60 min.)   UC ONCOLOGY INFUSION   [facility] 26     27       28     29     30     31                                Recent Results (from the past 24 hour(s))   CBC with platelets differential    Collection Time: 04/13/17  3:36 PM   Result Value Ref Range    WBC 10.7 4.0 - 11.0 10e9/L    RBC Count 2.44 (L) 4.4 - 5.9 10e12/L    Hemoglobin 6.4 (LL) 13.3 - 17.7 g/dL    Hematocrit 21.6 (L) 40.0 - 53.0 %    MCV 89 78 - 100 fl    MCH 26.2 (L) 26.5 - 33.0 pg    MCHC 29.6 (L) 31.5 - 36.5 g/dL    RDW 21.8 (H) 10.0 - 15.0 %    Platelet Count 395 150 - 450 10e9/L    Diff Method Manual Differential     % Neutrophils 87.7 %    % Lymphocytes 3.5 %    % Monocytes 8.8 %    % Eosinophils 0.0 %    % Basophils 0.0 %    Nucleated RBCs 1 (H) 0 /100    Absolute Neutrophil 9.4 (H) 1.6 - 8.3 10e9/L    Absolute Lymphocytes 0.4 (L) 0.8 - 5.3 10e9/L    Absolute Monocytes 0.9 0.0 - 1.3 10e9/L    Absolute Eosinophils 0.0 0.0 - 0.7 10e9/L    Absolute Basophils 0.0 0.0 - 0.2 10e9/L    Absolute Nucleated RBC 0.1     Anisocytosis Moderate     Poikilocytosis Slight     Polychromasia Moderate     Ovalocytes Slight    Bilirubin  total    Collection Time: 04/13/17  3:36 PM   Result Value Ref Range    Bilirubin Total 0.6 0.2 - 1.3 mg/dL   ABO/Rh type and screen    Collection Time:  04/13/17  3:41 PM   Result Value Ref Range    ABO A     RH(D)  Pos     Antibody Screen Neg     Test Valid Only At       [facility]    Specimen Expires 04/16/2017     Blood Bank Comment       This specimen was not labeled according to requirements for establishing a blood   type for transfusion purposes.  The patient can be transfused with type O red   cells until a new specimen is obtained to confirm the patient's blood type, at   which time type-specific blood can be transfused.

## 2017-04-13 NOTE — NURSING NOTE
Chief Complaint   Patient presents with     Blood Draw     Patient is here today for labs to be drawn via IV placement by Vascular Access. Labs collected,vitals charted, and patient checked into next appt.

## 2017-04-13 NOTE — PROGRESS NOTES
Infusion Nursing Note:  Naren Kimble presents today for Cycle 2 Day 1 Navelbine/Gemzar. Chemotherapy delayed until tomorrow. See TORB.     Patient seen by provider today: No   present during visit today: Not Applicable.    Note: pt feeling well today with no new complaints since visit with Dr. Bartholomew on 4/11. Pt had epidural placed prior to infusion, and reports that chronic back pain is better at this time. Pt's hemoglobin today: 6.4. Pt denies any dizziness/lightheadedness, SOB, excessive fatigue, or any other new or concerning symptoms. Pt has never had a blood transfusion in the past.    TORB 4/13/17 1610 Dr. Bartholomew/Batsheva Helms, CASSANDRA  1. Delay chemotherapy until tomorrow.  2. Pt to receive PRBCs 2 units tomorrow along with chemotherapy.  3. No additional labs needed tomorrow.    Pt verbalized understanding of this plan and is agreeable. Type and Cross added on to blood drawn today; blood prepared. LONG Hugo, to infusion room to obtain blood consent.    Pt has current, signed orders for Zometa and is due. Zometa labs were not drawn today, and pt discharged home before this RN pilo them. PIV placed today left intact for tomorrow.    Intravenous Access:  Peripheral IV placed per Vascular Access in lab today.    Treatment Conditions:  Lab Results   Component Value Date    HGB 6.4 04/13/2017     Lab Results   Component Value Date    WBC 10.7 04/13/2017      Lab Results   Component Value Date    ANEU 9.4 04/13/2017     Lab Results   Component Value Date     04/13/2017      Lab Results   Component Value Date                  Lab Results   Component Value Date    BILITOTAL 0.6 04/13/2017           Lab Results   Component Value Date       See TORB above for plan.       Post Infusion Assessment:  N/A  PIV flushed and left intact for infusion tomorrow. Catheter is positional, but brisk blood return obtained and flushes with ease.    Discharge Plan:   Patient declined prescription refills.  Copy of  AVS reviewed with patient and/or family.  Patient will return tomorrow for next appointment.  Patient discharged in stable condition accompanied by: wife.  Departure Mode: Ambulatory.  Face to Face time: 15 minutes.    EVE MIGUEL RN

## 2017-04-14 NOTE — MR AVS SNAPSHOT
After Visit Summary   4/14/2017    Naren Kimble    MRN: [id number]           Patient Information     Date Of Birth          1975        Visit Information        Provider Department      4/14/2017 9:00 AM UC 10 ATC;  ONCOLOGY INFUSION [facility]        Today's Diagnoses     Non-small cell lung cancer (NSCLC) (H)    -  1      Care Instructions    Contact Numbers    [facility] Main Line: [phone]  [facility] Triage:  [phone]    Call triage with chills and/or temperature greater than or equal to 100.5, uncontrolled nausea/vomiting, diarrhea, constipation, dizziness, shortness of breath, chest pain, bleeding, unexplained bruising, or any new/concerning symptoms, questions/concerns.     If you are having any concerning symptoms or wish to speak to a provider before your next infusion visit, please call your care coordinator or triage to notify them so we can adequately serve you.       After Hours: [phone]    If after hours, weekends, or holidays, call main hospital  and ask for Oncology doctor on call.        April 2017 Sunday Monday Tuesday Wednesday Thursday Friday Saturday                                 1       2     3     4     5     6     UMP NEW   12:30 PM   (75 min.)   Tracy Cervantes DO   [facility] 7     MR LUMBAR SPINE WWO    4:00 PM   (60 min.)   [address], [facility]     MR THORACIC SPINE WO    5:00 PM   (60 min.)   [address], [facility] 8       9     10     11     P [facility] LAB DRAW    1:00 PM   (15 min.)    [facility] LAB DRAW   [facility] Lab Draw     UMP RETURN    1:15 PM   (30 min.)   Nenita Bartholomew MD   [facility] 12     13     XR LUM/SAC TRANSFOR RUSS SINGLE    2:30 PM   (30 min.)   UCXR3   [facility] Xray     P [facility] LAB DRAW    3:30 PM   (15 min.)    [facility] LAB DRAW   [facility] Lab Draw     [facility] ONC INFUSION 60    4:00 PM   (60 min.)    ONCOLOGY INFUSION   M  [facility] 14     [facility] ONC INFUSION 360    9:00 AM   (360 min.)   UC ONCOLOGY INFUSION   [facility] 15       16     17     UMP RETURN    2:00 PM   (60 min.)   Eden Kovacs MD   [facility] 18     19     20     21     22       23     24     25     26     27     28     29       30                                              May 2017   Jayce Monday Tuesday Wednesday Thursday Friday Saturday        1     2     3     4     [facility]    9[facility]00 AM   (15 min.)    [facility] LAB DRAW   [facility] Mason[facility] Lab Draw     UMP RETURN    9:15 AM   (50 min.)   Yulia Lepe APRN CNP [facility] ONC INFUSION 60   11:00 AM   (60 min.)    ONCOLOGY INFUSION   [facility]     UMP RETURN   12:30 PM   (75 min.)   Tracy eCrvantes DO   [facility] 5     6       7     8     9     10     11     12     13       14     15     16     17     18     19     20       21     22     23     24     25     UMP ONC INFUSION 60    9:30 AM   (60 min.)   UC ONCOLOGY INFUSION   [facility] 26     27       28     29     30     31                                 Lab Results:  No results found for this or any previous visit (from the past 12 hour(s)).          Follow-ups after your visit        Your next 10 appointments already scheduled     Apr 17, 2017  2:00 PM CDT   Return Visit with Eden Kovacs MD   Radiation Oncology Clinic ([facility])    [facility]  1st Floor  [address] [address]   [phone]            May 04, 2017  9:00 AM CDT   [facility] Lab Draw with  [facility] LAB DRAW   [facility] ([facility]    [address] [facility]  [address]nd [facility] [id number] [phone]            May 04, 2017  9:30 AM CDT   (Arrive by 9:15 AM)   Return Visit with DANIA Stallworth CNP [facility]  [facility] ([facility])    [address] [place] [address]   [phone]            May 04, 2017 11:00 AM CDT   Infusion 60 with UC ONCOLOGY INFUSION, UC 29 ATC   [facility] ([facility])    [address] [place] [address]   [phone]            May 04, 2017 12:45 PM CDT   (Arrive by 12:30 PM)   Return Visit with Tracy Cervantes DO   [facility] ([facility])    [address] [place] [address] [phone]            May 25, 2017  9:30 AM CDT   Infusion 60 with UC ONCOLOGY INFUSION, UC 21 ATC   [facility] ([facility])    [address] [place] [address] [phone]              Future tests that were ordered for you today     Open Standing Orders        Priority Remaining Interval Expires Ordered    Red blood cell prepare order unit Routine 99/100 CONDITIONAL (SPECIFY) BLOOD  4/13/2017            Who to contact     If you have questions or need follow up information about today's clinic visit or your schedule please contact [facility] directly at [phone].  Normal or non-critical lab and imaging results will be communicated to you by [employer], letter or phone within 4 business days after the clinic has received the results. If you do not hear from us within 7 days, please contact the clinic through MyChart or phone. If you have a critical or abnormal lab result, we will notify you by phone as soon as possible.  Submit refill requests through [employer] or call your pharmacy and they will forward the refill request to us. Please allow 3 business days for your refill to be completed.          Additional Information About Your Visit        [employer] Information     [employer] gives you secure access to your electronic health  record. If you see a primary care provider, you can also send messages to your care team and make appointments. If you have questions, please call your primary care clinic.  If you do not have a primary care provider, please call [phone] and they will assist you.        Care EveryWhere ID     This is your Care EveryWhere ID. This could be used by other organizations to access your [place] medical records  [id number]         Blood Pressure from Last 3 Encounters:   04/13/17 (!) 147/96   04/13/17 149/82   04/11/17 135/90    Weight from Last 3 Encounters:   04/13/17 105.7 kg (233 lb)   04/11/17 104.8 kg (231 lb)   04/06/17 110.8 kg (244 lb 4.8 oz)              We Performed the Following     ABO/Rh type and screen     Red blood cell prepare order unit     Treatment Conditions        Primary Care Provider    None Specified       No primary provider on file.        Thank you!     Thank you for choosing [facility]  for your care. Our goal is always to provide you with excellent care. Hearing back from our patients is one way we can continue to improve our services. Please take a few minutes to complete the written survey that you may receive in the mail after your visit with us. Thank you!             Your Updated Medication List - Protect others around you: Learn how to safely use, store and throw away your medicines at www.disposemymeds.org.          This list is accurate as of: 4/14/17  9:39 AM.  Always use your most recent med list.                   Brand Name Dispense Instructions for use    albuterol 108 (90 BASE) MCG/ACT Inhaler   Generic drug:  albuterol      Inhale 1-2 puffs into the lungs daily as needed       cyclobenzaprine 10 MG tablet    FLEXERIL    90 tablet    Take 1 tablet (10 mg) by mouth 3 times daily as needed for muscle spasms       dexamethasone 4 MG tablet    DECADRON    60 tablet    Take 1 tablet (4 mg) by mouth 2 times daily (with meals)       * fentaNYL 50 mcg/hr 72 hr  patch    DURAGESIC    10 patch    Place 1 patch onto the skin every 72 hours       * fentaNYL 25 mcg/hr 72 hr patch    DURAGESIC    10 patch    Place 1 patch onto the skin every 72 hours Please use with 50 mcg patch (total of 75 mcg)       FOLIC ACID PO      Take 800 mcg by mouth daily       gabapentin 300 MG capsule    NEURONTIN    90 capsule    Take 1 capsule (300 mg) by mouth 3 times daily Start with 300 mg at night for first 2 nights, then increase to prescribed dose       IBUPROFEN PO      Take 1 tablet by mouth every 8 hours as needed       levETIRAcetam 100 MG/ML solution    KEPPRA     Take 10 mg/kg by mouth 2 times daily       lidocaine 5 % Patch    LIDODERM    30 patch    Apply up to 3 patches to painful area at once for up to 12 h within a 24 h period.  Remove after 12 hours.       LORazepam 0.5 MG tablet    ATIVAN    30 tablet    Take 1 tablet (0.5 mg) by mouth every 4 hours as needed (Anxiety, Nausea/Vomiting or Sleep)       magnesium hydroxide 2400 MG/10ML Susp    MOM     Take 5 mLs by mouth daily as needed for constipation Reported on 3/2/2017       ondansetron 8 MG tablet    ZOFRAN    90 tablet    Take 1 tablet (8 mg) by mouth every 8 hours as needed       order for DME     1 Units    Equipment being ordered: Wheelchair       OSCAL 500/200 D-3 PO          * oxyCODONE 10 MG 12 hr tablet    OxyCONTIN    30 tablet    Take 1 tablet (10 mg) by mouth At Bedtime maximum 1tablet(s) per day       * oxyCODONE 5 MG capsule    OXY-IR    200 capsule    Take 1-3 capsules (5-15 mg) by mouth every 4 hours as needed for moderate to severe pain       pantoprazole 40 MG EC tablet    PROTONIX    30 tablet    Take 1 tablet (40 mg) by mouth daily       prochlorperazine 10 MG tablet    COMPAZINE    30 tablet    Take 1 tablet (10 mg) by mouth every 6 hours as needed (Nausea/Vomiting)       SENNA CO          TYLENOL PO      Take 500 mg by mouth daily as needed       * Notice:  This list has 4 medication(s) that are the same  as other medications prescribed for you. Read the directions carefully, and ask your doctor or other care provider to review them with you.

## 2017-04-14 NOTE — PATIENT INSTRUCTIONS
Contact Numbers    [facility] Main Line: [phone]  [facility] Triage:  [phone]    Call triage with chills and/or temperature greater than or equal to 100.5, uncontrolled nausea/vomiting, diarrhea, constipation, dizziness, shortness of breath, chest pain, bleeding, unexplained bruising, or any new/concerning symptoms, questions/concerns.     If you are having any concerning symptoms or wish to speak to a provider before your next infusion visit, please call your care coordinator or triage to notify them so we can adequately serve you.       After Hours: [phone]    If after hours, weekends, or holidays, call main hospital  and ask for Oncology doctor on call.        April 2017 Sunday Monday Tuesday Wednesday Thursday Friday Saturday                                 1       2     3     4     5     6     UMP NEW   12:30 PM   (75 min.)   Tracy Cervantes DO   [facility] 7     MR LUMBAR SPINE WWO    4:00 PM   (60 min.)   UUMR2   [facility]     MR THORACIC SPINE WO    5:00 PM   (60 min.)   UUMR2   [facility] 8       9     10     11     P [facility] LAB DRAW    1:00 PM   (15 min.)    [facility] LAB DRAW   [facility] Lab Draw     UMP RETURN    1:15 PM   (30 min.)   Nenita Bartholomew MD   [facility] 12     13     XR LUM/SAC TRANSFOR RUSS SINGLE    2:30 PM   (30 min.)   UCXR3   [facility] Xray     P [facility] LAB DRAW    3:30 PM   (15 min.)    [facility] LAB DRAW   [facility] Lab Draw     P ONC INFUSION 60    4:00 PM   (60 min.)   UC ONCOLOGY INFUSION   [facility] 14     UMP ONC INFUSION 360    9:00 AM   (360 min.)   UC ONCOLOGY INFUSION   [facility] 15       16     17     UMP RETURN    2:00 PM   (60 min.)   Eden Kovacs MD   [facility] 18     19     20     21     22       23     24     25     26     27     28     29       30                                              May 2017    Sunday Monday Tuesday Wednesday Thursday Friday Saturday        1     2     3     4     [facility] MAS[facility] LAB DRAW    9:00 AM   (15 min.)   [facility] LAB DRAW   [facility] Lab Draw     UMP RETURN    9:15 AM   (50 min.)   Yulia Lepe APRN CNP   [facility]     UMP ONC INFUSION 60   11:00 AM   (60 min.)    ONCOLOGY INFUSION   [facility]     UMP RETURN   12:30 PM   (75 min.)   Tracy Cervantes DO   [facility] 5     6       7     8     9     10     11     12     13       14     15     16     17     18     19     20       21     22     23     24     25     UMP ONC INFUSION 60    9:30 AM   (60 min.)    ONCOLOGY INFUSION   [facility] 26     27       28     29     30     31                                 Lab Results:  No results found for this or any previous visit (from the past 12 hour(s)).

## 2017-04-14 NOTE — PROGRESS NOTES
Infusion Nursing Note:  Naren Kimble presents today for Cycle 2 Day 1 Navelbine, Gemzar and 2 units of PRBCs.    Patient seen by provider today: No   present during visit today: Not Applicable.    Note: JAS Bartholomew/Meghan Li, RN Please reorder Flexeril for patient.     Intravenous Access:  Peripheral IV in place from 4/13. Positive blood return noted before infusions started.     Treatment Conditions:  Lab Results   Component Value Date    HGB 6.4 04/13/2017     Lab Results   Component Value Date    WBC 10.7 04/13/2017      Lab Results   Component Value Date    ANEU 9.4 04/13/2017     Lab Results   Component Value Date     04/13/2017      Lab Results   Component Value Date     03/21/2017                   Lab Results   Component Value Date    POTASSIUM 4.8 03/21/2017           Lab Results   Component Value Date    MAG 1.9 01/31/2017            Lab Results   Component Value Date    CR 0.63 03/21/2017                   Lab Results   Component Value Date    ROSENDO 9.0 03/21/2017                Lab Results   Component Value Date    BILITOTAL 0.6 04/13/2017           Lab Results   Component Value Date    ALBUMIN 2.9 03/21/2017                    Lab Results   Component Value Date    ALT 32 03/21/2017           Lab Results   Component Value Date    AST 31 03/21/2017     Results reviewed, labs MET treatment parameters, ok to proceed with treatment.      Post Infusion Assessment:  Patient tolerated infusion without incident.  Blood return noted pre and post infusion.  Site patent and intact, free from redness, edema or discomfort.  No evidence of extravasations.  Access discontinued per protocol.    Discharge Plan:   Prescription refills given for Flexeril.  Discharge instructions reviewed with: Patient and wife Jhonatan.  Patient and family verbalized understanding of discharge instructions and all questions answered.  Copy of AVS reviewed with patient and family.  Patient will return 5/4/17 for  next appointment.  Patient discharged in stable condition accompanied by: self and wife.  Departure Mode: Ambulatory.    Lucy Li RN

## 2017-04-17 NOTE — MR AVS SNAPSHOT
After Visit Summary   4/17/2017    Naren Kimble    MRN: [id number]           Patient Information     Date Of Birth          1975        Visit Information        Provider Department      4/17/2017 2:00 PM Eden Kovacs MD Radiation Oncology Clinic        Today's Diagnoses     Non-small cell lung cancer (NSCLC) (H)    -  1       Follow-ups after your visit        Your next 10 appointments already scheduled     Apr 20, 2017  2:00 PM CDT   EXTERNAL RADIATION TREATMENT with [facility] RAD ONC ELEKTA   Radiation Oncology Clinic ([facility])    [facility]  1st Floor  [address] [address]   [phone]            Apr 20, 2017  2:15 PM CDT   ON TREATMENT VISIT with Eden Kovacs MD   Radiation Oncology Clinic ([facility])    [facility]  1st Floor  [address] [address]   [phone]            Apr 21, 2017  2:00 PM CDT   EXTERNAL RADIATION TREATMENT with [facility] RAD ONC ELEKTA   Radiation Oncology Clinic ([facility])    [facility]  1st Floor  [address] [address]   [phone]            Apr 24, 2017  2:00 PM CDT   EXTERNAL RADIATION TREATMENT with [facility] RAD ONC ELEKTA   Radiation Oncology Clinic ([facility])    [facility]  1st Floor  [address] [address]   [phone]            Apr 25, 2017  2:00 PM CDT   EXTERNAL RADIATION TREATMENT with [facility] RAD ONC ELEKTA   Radiation Oncology Clinic ([facility])    [facility]  1st Floor  [address] [address]   [phone]            May 04, 2017  9:00 AM CDT   [facility] Lab Draw with  M[facility]ONIC LAB DRAW   [facility] Lab Draw ([facility])    [address]  2nd Floor  [facility] [address]   [phone]            May 04, 2017  9:30  AM CDT   (Arrive by 9:15 AM)   Return Visit with DANIA Stallworth CNP   [facility]    [address] [place] [address] [phone]            May 04, 2017 11:00 AM CDT   Infusion 60 with UC ONCOLOGY INFUSION, UC 29 ATC   [facility]    [address] [place] [address] [phone]            May 04, 2017 12:45 PM CDT   (Arrive by 12:30 PM)   Return Visit with Tracy Cervantes DO   [facility]    [address] [place] [address] [phone]              Who to contact     Please call your clinic at [phone] to:    Ask questions about your health    Make or cancel appointments    Discuss your medicines    Learn about your test results    Speak to your doctor   If you have compliments or concerns about an experience at your clinic, or if you wish to file a complaint, please contact [facility] Patient Relations at [phone] or email us at [email or web address]         Additional Information About Your Visit        [employer] Information     [employer] gives you secure access to your electronic health record. If you see a primary care provider, you can also send messages to your care team and make appointments. If you have questions, please call your primary care clinic.  If you do not have a primary care provider, please call [phone] and they will assist you.      [employer] is an electronic gateway that provides easy, online access to your medical records. With [employer], you can request a clinic appointment, read your test results, renew a prescription or communicate with your care team.     To access your existing account, please contact your [facility] or call [phone] for assistance.         Care EveryWhere ID     This is your Care EveryWhere ID. This could be used by other organizations to access your [place] medical records  [id number]        Your Vitals Were     Pulse Pulse Oximetry BMI (Body Mass Index)             112 99% 30.07 kg/m2          Blood Pressure from Last 3 Encounters:   04/17/17 103/66   04/14/17 (!) 150/91   04/13/17 (!) 147/96    Weight from Last 3 Encounters:   04/17/17 100.6 kg (221 lb 11.2 oz)   04/13/17 105.7 kg (233 lb)   04/11/17 104.8 kg (231 lb)              Today, you had the following     No orders found for display       Primary Care Provider    None Specified       No primary provider on file.        Thank you!     Thank you for choosing RADIATION ONCOLOGY CLINIC  for your care. Our goal is always to provide you with excellent care. Hearing back from our patients is one way we can continue to improve our services. Please take a few minutes to complete the written survey that you may receive in the mail after your visit with us. Thank you!             Your Updated Medication List - Protect others around you: Learn how to safely use, store and throw away your medicines at www.disposemymeds.org.          This list is accurate as of: 4/17/17 11:59 PM.  Always use your most recent med list.                   Brand Name Dispense Instructions for use    albuterol 108 (90 BASE) MCG/ACT Inhaler   Generic drug:  albuterol      Inhale 1-2 puffs into the lungs daily as needed       cyclobenzaprine 10 MG tablet    FLEXERIL    90 tablet    Take 1 tablet (10 mg) by mouth 3 times daily as needed for muscle spasms       dexamethasone 4 MG tablet    DECADRON    60 tablet    Take 1 tablet (4 mg) by mouth 2 times daily (with meals)       * fentaNYL 50 mcg/hr 72 hr patch    DURAGESIC    10 patch    Place 1 patch onto the skin every 72 hours       * fentaNYL 25 mcg/hr 72 hr patch    DURAGESIC    10 patch    Place 1 patch onto the skin every 72 hours Please use with 50 mcg patch (total  of 75 mcg)       FOLIC ACID PO      Take 800 mcg by mouth daily       gabapentin 300 MG capsule    NEURONTIN    90 capsule    Take 1 capsule (300 mg) by mouth 3 times daily Start with 300 mg at night for first 2 nights, then increase to prescribed dose       IBUPROFEN PO      Take 1 tablet by mouth every 8 hours as needed       levETIRAcetam 100 MG/ML solution    KEPPRA     Take 10 mg/kg by mouth 2 times daily       lidocaine 5 % Patch    LIDODERM    30 patch    Apply up to 3 patches to painful area at once for up to 12 h within a 24 h period.  Remove after 12 hours.       LORazepam 0.5 MG tablet    ATIVAN    30 tablet    Take 1 tablet (0.5 mg) by mouth every 4 hours as needed (Anxiety, Nausea/Vomiting or Sleep)       magnesium hydroxide 2400 MG/10ML Susp    MOM     Take 5 mLs by mouth daily as needed for constipation Reported on 3/2/2017       ondansetron 8 MG tablet    ZOFRAN    90 tablet    Take 1 tablet (8 mg) by mouth every 8 hours as needed       order for DME     1 Units    Equipment being ordered: Wheelchair       OSCAL 500/200 D-3 PO          oxyCODONE 5 MG capsule    OXY-IR    200 capsule    Take 1-3 capsules (5-15 mg) by mouth every 4 hours as needed for moderate to severe pain       pantoprazole 40 MG EC tablet    PROTONIX    30 tablet    Take 1 tablet (40 mg) by mouth daily       prochlorperazine 10 MG tablet    COMPAZINE    30 tablet    Take 1 tablet (10 mg) by mouth every 6 hours as needed (Nausea/Vomiting)       SENNA CO          TYLENOL PO      Take 500 mg by mouth daily as needed       * Notice:  This list has 2 medication(s) that are the same as other medications prescribed for you. Read the directions carefully, and ask your doctor or other care provider to review them with you.

## 2017-04-17 NOTE — PROGRESS NOTES
RADIATION ONCOLOGY FOLLOW-UP VISIT  DATE: Apr 17, 2017    NAME: Naren Kimble  MRN: [id number]      DISEASE TREATED: Metastatic NSCLC    RADIATION THERAPY DELIVERED:   1. 3000 cGy in 5 fractions to left frontal tumor resection bed, completed 09/16/2016 (Dr. Hartmann)   2. 2000 cGy in 5 fractions to the Thoracic spine / Pelvis / Left hip, completed 01/20/2017 (Dr. Kovacs)  3. Gamma knife SRS to 10 different intracranial lesions, completed 03/28/2017 (Dr. Fraser)    INTERVAL SINCE COMPLETION OF RT: 3 weeeks since most recent GK SRS on 03/28/2017    SUBJECTIVE:  Mr. Kimble is a 41yo gentleman with a history of metastatic NSCLC. He is well known to our clinic as we have treated him with a course of palliative radiotherapy in the past, as well as a recent course of Gamma Knife SRS completed just 3 weeks ago as outlined above.     He has continued to have close followup with Dr. Bartholomew in medical oncology. Most recently, he switched over to Vinorelbine/Gemcitabine for palliative chemotherapy. Unfortunately, over the past several weeks, he has been complaining of increasing lower extremity weakness and numbness, in addition to significant lower back pain. He is currently being followed by palliative care for pain management and using a fentanyl patch, oxycodone, neurontin, ibuprofen, and tylenol. The pain is controlled with theses medications, but not well. Occasionally, his pain will get up to a 9/10 in his mid-low back.     Recent MRI of the lumbar and thoracic spine demonstrated worsening diffuse metastatic lesions, with new pathologic compression deformity of T5, and new epidural involvement. There were also numerous rib and paraspinous lesions identified. Furthermore, there was worsening diffuse metastatic lesions in the lumbar spine, with new pathologic compression deformity of L2, and multilevel spinal canal and epidural narrowing (most notably the T12-L1 neural foraminal and epidural space). Additionally, there was a  large new paraspinous mass (over 9 cm) at the level of L2-L3 that was contiguous with epidural involvement, spinal canal stenosis, and bony lesions. He now presents today for consideration of an additional course of palliative radiotherapy.    PHYSICAL EXAM:  VITALS: /66  Pulse 112  Wt 100.6 kg (221 lb 11.2 oz)  SpO2 99%  BMI 30.07 kg/m2  GEN: Appears well, alert, oriented, and in NAD  HEENT: NCAT, EOMI, normal conjunctiva  CV: RRR, no murmurs/rubs/gallops, warm and well-perfused  RESP: CTAB, no wheezes/rales/rhonchi, good aeration throughout all lung fields, breathing comfortably on room air  MSK:     LABS AND IMAGING: Reviewed. Recent MRI results as mentioned above.     IMPRESSION:   Mr. Kimble is a 42 year old male with metastatic NSCLC with progressive disease. Recent TL spine MRI demonstrating worsening diffuse metastatic disease. He is having significant lower back pain as well as progressive neurologic symptoms in LEs, which correlate to lumbar MRI findings.     PLAN:  1. Recommend palliative RT to T12-L4 (previuosly received tx to L5, so will avoid any overlap)  2. Informed consent obtained and patient proceeded to CT sim for RT planning purposes.   3. Plan to start RT tomorrow, 2000 cGy in 5 fractions (Tue, Wed, Thu, Mon, Tue).    Mr. Kimble was seen and discussed with staff, Dr. Kovacs.    Foreign Fraser MD  Resident Radiation Oncology  [facility]    lung cancer    CC  Patient Care Team:  Nenita Bartholomew MD as MD (Oncology)  Aleida Aguilar, CASSANDRA as Nurse Coordinator (Oncology)  NENITA BARTHOLOMEW

## 2017-04-17 NOTE — PROGRESS NOTES
A simulation was done for radiotherapy planning. Detailed technical note regarding this procedure is located in the [facility] record and verify system.  We will treat T12-L4    L. Eden Kovacs M.D.  Department of Radiation Oncology  [facility]

## 2017-04-17 NOTE — PROGRESS NOTES
HPI  FOLLOW-UP VISIT    Patient Name: Naren Kimble      : 1975     Age: 42 year old        ______________________________________________________________________________     Chief Complaint   Patient presents with     Cancer     radiation consult for new met     /66  Pulse 112  Wt 100.6 kg (221 lb 11.2 oz)  SpO2 99%  BMI 30.07 kg/m2     Date Radiation Completed: Thoracic 2000 cGy, Pelvis 2000 cGy, Left Hip 2000 cGy completed on 17    Pain  Current history of pain associated with this visit:   Intensity: 9/10  Current: dull and aching  Location: mid-low back  Treatment: Fentanyl patch, oxycodone, decadron, Neurontin, ibuprofen and tylenol.   Pt states he has some control with these medication but not good control. He does have contact with palliative care for help with monitoring pain medications      Labs  Other Labs: Blood work per med onc after this appointment    Imaging  None    Other Appointments: No    MD Name:  Appointment Date:    MD Name: Appointment Date:   MD Name: Appointment Date:   Other Appointment Note:      Review of Systems   Constitutional: Positive for malaise/fatigue (increased in the last couple weeks) and weight loss (on chemo with decreased appetite). Negative for chills, diaphoresis and fever.   HENT: Negative for ear pain, hearing loss, nosebleeds and sore throat.    Eyes: Negative for blurred vision, double vision and pain.   Respiratory: Negative for cough and shortness of breath.    Cardiovascular: Negative for chest pain and leg swelling.   Gastrointestinal: Positive for constipation (With pain meds, under control). Negative for blood in stool, diarrhea, nausea and vomiting.   Genitourinary: Positive for dysuria (stream not as strong, started with increased pain meds). Negative for frequency, hematuria and urgency.   Musculoskeletal: Positive for back pain (see pain note). Negative for joint pain and neck pain.   Skin: Negative for rash.   Neurological:  Positive for tingling (Rt hand and bilater feet r/t chemo) and weakness (Bilateral legs Lt>Rt). Negative for dizziness, seizures and headaches.   Endo/Heme/Allergies: Does not bruise/bleed easily (recent treansfussion r/t low HGB. 2 units).   Psychiatric/Behavioral: Negative for depression. The patient is not nervous/anxious and does not have insomnia.

## 2017-04-17 NOTE — PROGRESS NOTES
"Valerio received 2units PRBC on Friday, 4/14/17 for a Hgb of 6.4 (4/13).  Called to see how he was doing and request repeat labs today.    Valerio states that he couldn't really feel a difference after the blood transfusion.  He denies being more fatigued than usual or having increased SOB.    Valerio also received an epidural to help with back pain on 4/13/17.  He states that it is helping \"a little bit\".    Today Valerio meets with Dr. Kovacs and will have labs drawn on the 3rd floor of [facility] after.      "

## 2017-04-17 NOTE — MR AVS SNAPSHOT
After Visit Summary   4/17/2017    Naren Kimble    MRN: [id number]           Patient Information     Date Of Birth          1975        Visit Information        Provider Department      4/17/2017 3:00 PM Eden Kovacs MD Radiation Oncology Clinic        Today's Diagnoses     Bone metastasis (H)    -  1       Follow-ups after your visit        Your next 10 appointments already scheduled     Apr 17, 2017  3:00 PM CDT   LAB with ACUTE CARE LAB [facility] [place], Lab ([facility])    [address] [address]              Patient must bring picture ID.  Patient should be prepared to give a urine specimen  Please do not eat 10-12 hours before your appointment if you are coming in fasting for labs on lipids, cholesterol, or glucose (sugar).  Pregnant women should follow their Care Team instructions. Water with medications is okay. Do not drink coffee or other fluids.   If you have concerns about taking  your medications, please ask at office or if scheduling via [employer], send a message by clicking on Secure Messaging, Message Your Care Team.            Apr 17, 2017  3:00 PM CDT   TCT/SIM Suite Visit with Eden Kovacs MD   Radiation Oncology Clinic ([facility])    [facility]  1st Floor  [address] [address] [phone]            May 04, 2017  9:00 AM CDT   [facility] Draw with [facility] LAB DRAW   [facility] Lab Draw ([facility])    [address]  2nd [facility] [id number] [phone]            May 04, 2017  9:30 AM CDT   (Arrive by 9:15 AM)   Return Visit with DANIA Stallworth [facility] ([facility])    [address]  2nd Floor  [facility] [id number] [phone]            May 04, 2017 11:00 AM CDT   Infusion 60 with  ONCOLOGY  INFUSION, UC 29 ATC   [facility] ([facility])    [address]  [address] [facility] [address] [phone]            May 04, 2017 12:45 PM CDT   (Arrive by 12:30 PM)   Return Visit with Tracy Cervantes DO   [facility] ([facility])    [address]  [address] [facility] [address] [phone]            May 25, 2017  9:30 AM CDT   Infusion 60 with UC ONCOLOGY INFUSION, UC 21 ATC   [facility] ([facility])    [address]  2nd Floor  [facility] [address] [phone]              Who to contact     Please call your clinic at [phone] to:    Ask questions about your health    Make or cancel appointments    Discuss your medicines    Learn about your test results    Speak to your doctor   If you have compliments or concerns about an experience at your clinic, or if you wish to file a complaint, please contact [facility] Patient Relations at [phone] or email us at [email or web address]         Additional Information About Your Visit        [employer] [facility] Information     [employer] gives you secure access to your electronic health record. If you see a primary care provider, you can also send messages to your care team and make appointments. If you have questions, please call your primary care clinic.  If you do not have a primary care provider, please call [phone] and they will assist you.      [employer] is an electronic gateway that provides easy, online access to your medical records. With [employer], you can request a clinic appointment, read your test results, renew a prescription or communicate with your care team.     To access your existing account, please contact your [facility] or call [phone] for assistance.        Care EveryWhere ID     This is your Care  EveryWhere ID. This could be used by other organizations to access your [facility] medical records  [id number]         Blood Pressure from Last 3 Encounters:   04/17/17 103/66   04/14/17 (!) 150/91   04/13/17 (!) 147/96    Weight from Last 3 Encounters:   04/17/17 100.6 kg (221 lb 11.2 oz)   04/13/17 105.7 kg (233 lb)   04/11/17 104.8 kg (231 lb)              Today, you had the following     No orders found for display       Primary Care Provider    None Specified       No primary provider on file.        Thank you!     Thank you for choosing RADIATION ONCOLOGY CLINIC  for your care. Our goal is always to provide you with excellent care. Hearing back from our patients is one way we can continue to improve our services. Please take a few minutes to complete the written survey that you may receive in the mail after your visit with us. Thank you!             Your Updated Medication List - Protect others around you: Learn how to safely use, store and throw away your medicines at www.disposemymeds.org.          This list is accurate as of: 4/17/17  2:45 PM.  Always use your most recent med list.                   Brand Name Dispense Instructions for use    albuterol 108 (90 BASE) MCG/ACT Inhaler   Generic drug:  albuterol      Inhale 1-2 puffs into the lungs daily as needed       cyclobenzaprine 10 MG tablet    FLEXERIL    90 tablet    Take 1 tablet (10 mg) by mouth 3 times daily as needed for muscle spasms       dexamethasone 4 MG tablet    DECADRON    60 tablet    Take 1 tablet (4 mg) by mouth 2 times daily (with meals)       * fentaNYL 50 mcg/hr 72 hr patch    DURAGESIC    10 patch    Place 1 patch onto the skin every 72 hours       * fentaNYL 25 mcg/hr 72 hr patch    DURAGESIC    10 patch    Place 1 patch onto the skin every 72 hours Please use with 50 mcg patch (total of 75 mcg)       FOLIC ACID PO      Take 800 mcg by mouth daily       gabapentin 300 MG capsule    NEURONTIN    90 capsule    Take 1 capsule (300 mg)  by mouth 3 times daily Start with 300 mg at night for first 2 nights, then increase to prescribed dose       IBUPROFEN PO      Take 1 tablet by mouth every 8 hours as needed       levETIRAcetam 100 MG/ML solution    KEPPRA     Take 10 mg/kg by mouth 2 times daily       lidocaine 5 % Patch    LIDODERM    30 patch    Apply up to 3 patches to painful area at once for up to 12 h within a 24 h period.  Remove after 12 hours.       LORazepam 0.5 MG tablet    ATIVAN    30 tablet    Take 1 tablet (0.5 mg) by mouth every 4 hours as needed (Anxiety, Nausea/Vomiting or Sleep)       magnesium hydroxide 2400 MG/10ML Susp    MOM     Take 5 mLs by mouth daily as needed for constipation Reported on 3/2/2017       ondansetron 8 MG tablet    ZOFRAN    90 tablet    Take 1 tablet (8 mg) by mouth every 8 hours as needed       order for DME     1 Units    Equipment being ordered: Wheelchair       OSCAL 500/200 D-3 PO          oxyCODONE 5 MG capsule    OXY-IR    200 capsule    Take 1-3 capsules (5-15 mg) by mouth every 4 hours as needed for moderate to severe pain       pantoprazole 40 MG EC tablet    PROTONIX    30 tablet    Take 1 tablet (40 mg) by mouth daily       prochlorperazine 10 MG tablet    COMPAZINE    30 tablet    Take 1 tablet (10 mg) by mouth every 6 hours as needed (Nausea/Vomiting)       SENNA CO          TYLENOL PO      Take 500 mg by mouth daily as needed       * Notice:  This list has 2 medication(s) that are the same as other medications prescribed for you. Read the directions carefully, and ask your doctor or other care provider to review them with you.

## 2017-04-17 NOTE — LETTER
4/17/2017       RE: Naren Kimble  [address]  [facility] [address]     Dear Colleague,    Thank you for referring your patient, Naren Kimble, to the RADIATION ONCOLOGY CLINIC. Please see a copy of my visit note below.    RADIATION ONCOLOGY FOLLOW-UP VISIT  DATE: Apr 17, 2017    NAME: Naren Kimble  MRN: [id number]      DISEASE TREATED: Metastatic NSCLC    RADIATION THERAPY DELIVERED:   1. 3000 cGy in 5 fractions to left frontal tumor resection bed, completed 09/16/2016 (Dr. Hartmann)   2. 2000 cGy in 5 fractions to the Thoracic spine / Pelvis / Left hip, completed 01/20/2017 (Dr. Kovacs)  3. Gamma knife SRS to 10 different intracranial lesions, completed 03/28/2017 (Dr. Fraser)    INTERVAL SINCE COMPLETION OF RT: 3 weeeks since most recent GK SRS on 03/28/2017    SUBJECTIVE:  Mr. Kimble is a 41yo gentleman with a history of metastatic NSCLC. He is well known to our clinic as we have treated him with a course of palliative radiotherapy in the past, as well as a recent course of Gamma Knife SRS completed just 3 weeks ago as outlined above.     He has continued to have close followup with Dr. Bartholomew in medical oncology. Most recently, he switched over to Vinorelbine/Gemcitabine for palliative chemotherapy. Unfortunately, over the past several weeks, he has been complaining of increasing lower extremity weakness and numbness, in addition to significant lower back pain. He is currently being followed by palliative care for pain management and using a fentanyl patch, oxycodone, neurontin, ibuprofen, and tylenol. The pain is controlled with theses medications, but not well. Occasionally, his pain will get up to a 9/10 in his mid-low back.     Recent MRI of the lumbar and thoracic spine demonstrated worsening diffuse metastatic lesions, with new pathologic compression deformity of T5, and new epidural involvement. There were also numerous rib and paraspinous lesions identified. Furthermore, there was worsening diffuse  metastatic lesions in the lumbar spine, with new pathologic compression deformity of L2, and multilevel spinal canal and epidural narrowing (most notably the T12-L1 neural foraminal and epidural space). Additionally, there was a large new paraspinous mass (over 9 cm) at the level of L2-L3 that was contiguous with epidural involvement, spinal canal stenosis, and bony lesions. He now presents today for consideration of an additional course of palliative radiotherapy.    PHYSICAL EXAM:  VITALS: /66  Pulse 112  Wt 100.6 kg (221 lb 11.2 oz)  SpO2 99%  BMI 30.07 kg/m2  GEN: Appears well, alert, oriented, and in NAD  HEENT: NCAT, EOMI, normal conjunctiva  CV: RRR, no murmurs/rubs/gallops, warm and well-perfused  RESP: CTAB, no wheezes/rales/rhonchi, good aeration throughout all lung fields, breathing comfortably on room air  MSK:     LABS AND IMAGING: Reviewed. Recent MRI results as mentioned above.     IMPRESSION:   Mr. Kimble is a 42 year old male with metastatic NSCLC with progressive disease. Recent TL spine MRI demonstrating worsening diffuse metastatic disease. He is having significant lower back pain as well as progressive neurologic symptoms in LEs, which correlate to lumbar MRI findings.     PLAN:  1. Recommend palliative RT to T12-L4 (previuosly received tx to L5, so will avoid any overlap)  2. Informed consent obtained and patient proceeded to CT sim for RT planning purposes.   3. Plan to start RT tomorrow, 2000 cGy in 5 fractions (Tue, Wed, Thu, Mon, Tue).    Mr. Kimble was seen and discussed with staff, Dr. Kovacs.    Foreign Fraser MD  Resident Radiation Oncology  [facility]    lung cancer    CC  Patient Care Team:  Nenita Bartholomew MD as MD (Oncology)  Aleida Aguilar, CASSANDRA as Nurse Coordinator (Oncology)        HPI  FOLLOW-UP VISIT    Patient Name: Naren Kimble      : 1975     Age: 42 year old        ______________________________________________________________________________      Chief Complaint   Patient presents with     Cancer     radiation consult for new met     /66  Pulse 112  Wt 100.6 kg (221 lb 11.2 oz)  SpO2 99%  BMI 30.07 kg/m2     Date Radiation Completed: Thoracic 2000 cGy, Pelvis 2000 cGy, Left Hip 2000 cGy completed on 1/20/17    Pain  Current history of pain associated with this visit:   Intensity: 9/10  Current: dull and aching  Location: mid-low back  Treatment: Fentanyl patch, oxycodone, decadron, Neurontin, ibuprofen and tylenol.   Pt states he has some control with these medication but not good control. He does have contact with palliative care for help with monitoring pain medications      Labs  Other Labs: Blood work per med onc after this appointment    Imaging  None    Other Appointments: No    MD Name:  Appointment Date:    MD Name: Appointment Date:   MD Name: Appointment Date:   Other Appointment Note:      Review of Systems   Constitutional: Positive for malaise/fatigue (increased in the last couple weeks) and weight loss (on chemo with decreased appetite). Negative for chills, diaphoresis and fever.   HENT: Negative for ear pain, hearing loss, nosebleeds and sore throat.    Eyes: Negative for blurred vision, double vision and pain.   Respiratory: Negative for cough and shortness of breath.    Cardiovascular: Negative for chest pain and leg swelling.   Gastrointestinal: Positive for constipation (With pain meds, under control). Negative for blood in stool, diarrhea, nausea and vomiting.   Genitourinary: Positive for dysuria (stream not as strong, started with increased pain meds). Negative for frequency, hematuria and urgency.   Musculoskeletal: Positive for back pain (see pain note). Negative for joint pain and neck pain.   Skin: Negative for rash.   Neurological: Positive for tingling (Rt hand and bilater feet r/t chemo) and weakness (Bilateral legs Lt>Rt). Negative for dizziness, seizures and headaches.   Endo/Heme/Allergies: Does not bruise/bleed  easily (recent treansfussion r/t low HGB. 2 units).   Psychiatric/Behavioral: Negative for depression. The patient is not nervous/anxious and does not have insomnia.      Again, thank you for allowing me to participate in the care of your patient.      Sincerely,    Eden Kovacs MD

## 2017-04-20 NOTE — MR AVS SNAPSHOT
After Visit Summary   4/20/2017    Naren Kimble    MRN: [id number]           Patient Information     Date Of Birth          1975        Visit Information        Provider Department      4/20/2017 2:15 PM Eden Kovacs MD Radiation Oncology Clinic        Today's Diagnoses     Brain metastasis (H)    -  1       Follow-ups after your visit        Your next 10 appointments already scheduled     Apr 21, 2017  2:00 PM CDT   EXTERNAL RADIATION TREATMENT with P RAD ONC ELEKTA   Radiation Oncology Clinic ([facility])    [facility]  1st Floor  [address] [address]   [phone]            Apr 24, 2017  2:00 PM CDT   EXTERNAL RADIATION TREATMENT with [facility] RAD ONC ELEKTA   Radiation Oncology Clinic ([facility])    [facility]  1st Floor  [address] [address]   [phone]            Apr 25, 2017  2:00 PM CDT   EXTERNAL RADIATION TREATMENT with [facility] RAD ONC ELEKTA   Radiation Oncology Clinic ([facility])    [facility]  1st Floor  [address] [address]   [phone]            May 04, 2017  9:00 AM CDT   [facility] Draw with  [facility] DRAW   [facility] Lab Draw ([facility])    [address]  2nd [facility] [address]   [phone]            May 04, 2017  9:30 AM CDT   (Arrive by 9:15 AM)   Return Visit with DANIA Stallworth [facility] ([facility])    [address]  [address] [facility] [address]   [phone]            May 04, 2017 11:00 AM CDT   Infusion 60 with UC ONCOLOGY INFUSION, UC 29 ATC   [facility] ([facility])    [address]  2nd Floor  [facility] [address]   [phone]            May 04, 2017 12:45 PM CDT   (Arrive by 12:30 PM)    Return Visit with Tracy Cervantes DO   [facility] ([facility])    [address]  2nd Floor  [facility] [address] [phone]            May 25, 2017  9:30 AM CDT   Infusion 60 with UC ONCOLOGY INFUSION, UC 21 ATC   [facility] ([facility])    [address]  2nd [facility] [address] [phone]              Who to contact     Please call your clinic at [phone] to:    Ask questions about your health    Make or cancel appointments    Discuss your medicines    Learn about your test results    Speak to your doctor   If you have compliments or concerns about an experience at your clinic, or if you wish to file a complaint, please contact [facility] Patient Relations at [phone] or email us at [email or web address]         Additional Information About Your Visit        [employer] Information     [employer] gives you secure access to your electronic health record. If you see a primary care provider, you can also send messages to your care team and make appointments. If you have questions, please call your primary care clinic.  If you do not have a primary care provider, please call [phone] and they will assist you.      [employer] is an electronic gateway that provides easy, online access to your medical records. With [employer], you can request a clinic appointment, read your test results, renew a prescription or communicate with your care team.     To access your existing account, please contact your [facility] or call [phone] for assistance.        Care EveryWhere ID     This is your Care EveryWhere ID. This could be used by other organizations to access your [place] medical records  [id number]        Your Vitals Were     BMI (Body Mass Index)                   29.16 kg/m2            Blood Pressure from Last 3  Encounters:   04/17/17 103/66   04/14/17 (!) 150/91   04/13/17 (!) 147/96    Weight from Last 3 Encounters:   04/20/17 97.5 kg (215 lb)   04/17/17 100.6 kg (221 lb 11.2 oz)   04/13/17 105.7 kg (233 lb)              Today, you had the following     No orders found for display       Primary Care Provider    None Specified       No primary provider on file.        Thank you!     Thank you for choosing RADIATION ONCOLOGY CLINIC  for your care. Our goal is always to provide you with excellent care. Hearing back from our patients is one way we can continue to improve our services. Please take a few minutes to complete the written survey that you may receive in the mail after your visit with us. Thank you!             Your Updated Medication List - Protect others around you: Learn how to safely use, store and throw away your medicines at www.disposemymeds.org.          This list is accurate as of: 4/20/17  2:20 PM.  Always use your most recent med list.                   Brand Name Dispense Instructions for use    albuterol 108 (90 BASE) MCG/ACT Inhaler   Generic drug:  albuterol      Inhale 1-2 puffs into the lungs daily as needed       cyclobenzaprine 10 MG tablet    FLEXERIL    90 tablet    Take 1 tablet (10 mg) by mouth 3 times daily as needed for muscle spasms       dexamethasone 4 MG tablet    DECADRON    60 tablet    Take 1 tablet (4 mg) by mouth 2 times daily (with meals)       * fentaNYL 50 mcg/hr 72 hr patch    DURAGESIC    10 patch    Place 1 patch onto the skin every 72 hours       * fentaNYL 25 mcg/hr 72 hr patch    DURAGESIC    10 patch    Place 1 patch onto the skin every 72 hours Please use with 50 mcg patch (total of 75 mcg)       FOLIC ACID PO      Take 800 mcg by mouth daily       gabapentin 300 MG capsule    NEURONTIN    90 capsule    Take 1 capsule (300 mg) by mouth 3 times daily Start with 300 mg at night for first 2 nights, then increase to prescribed dose       IBUPROFEN PO      Take 1 tablet by  mouth every 8 hours as needed       levETIRAcetam 100 MG/ML solution    KEPPRA     Take 10 mg/kg by mouth 2 times daily       lidocaine 5 % Patch    LIDODERM    30 patch    Apply up to 3 patches to painful area at once for up to 12 h within a 24 h period.  Remove after 12 hours.       LORazepam 0.5 MG tablet    ATIVAN    30 tablet    Take 1 tablet (0.5 mg) by mouth every 4 hours as needed (Anxiety, Nausea/Vomiting or Sleep)       magnesium hydroxide 2400 MG/10ML Susp    MOM     Take 5 mLs by mouth daily as needed for constipation Reported on 3/2/2017       ondansetron 8 MG tablet    ZOFRAN    90 tablet    Take 1 tablet (8 mg) by mouth every 8 hours as needed       order for DME     1 Units    Equipment being ordered: Wheelchair       OSCAL 500/200 D-3 PO          oxyCODONE 5 MG capsule    OXY-IR    200 capsule    Take 1-3 capsules (5-15 mg) by mouth every 4 hours as needed for moderate to severe pain       pantoprazole 40 MG EC tablet    PROTONIX    30 tablet    Take 1 tablet (40 mg) by mouth daily       prochlorperazine 10 MG tablet    COMPAZINE    30 tablet    Take 1 tablet (10 mg) by mouth every 6 hours as needed (Nausea/Vomiting)       SENNA CO          TYLENOL PO      Take 500 mg by mouth daily as needed       * Notice:  This list has 2 medication(s) that are the same as other medications prescribed for you. Read the directions carefully, and ask your doctor or other care provider to review them with you.

## 2017-04-20 NOTE — LETTER
4/20/2017       RE: Naren Kimble  [address] [facility] [address]     Dear Colleague,    Thank you for referring your patient, Naren Kimble, to the RADIATION ONCOLOGY CLINIC. Please see a copy of my visit note below.    WEEKLY MANAGEMENT NOTE  Radiation Oncology          Patient Name: Naren Kimble  MRN: [id number]    Treatment Site:T12-L4 lumbar spine Current Dose: 1200/2000 cGy Fractions: 3/5       DAILY DOSE:     400  cGy/ day,  5 times/week    DISEASE UNDER TREATMENT:      SUBJECTIVE:    Treatment Related Toxicities (CTCAE V4.0 Toxicity Criteria)    Fatigue: Grade 1: Fatigue relieved by rest  Pain Score: 3 /10       GI:  Diarrhea:Grade 0  No change over baseline  [place] Grade 0: No toxicity  Comment:      OBJECTIVE:  Wt 97.5 kg (215 lb)  BMI 29.16 kg/m2      IMPRESSION: The patient is tolerating the treatment.  The patient set up, dose, and cone beam CT images were reviewed.      PLAN: The patient will continue radiotherapy    PAIN MANAGEMENT PLAN:  The patient will continue current pain medication      TEQUILA Kovacs M.D.  Department of Radiation Oncology  [facility]

## 2017-04-20 NOTE — PROGRESS NOTES
WEEKLY MANAGEMENT NOTE  Radiation Oncology          Patient Name: Naren Kimble  MRN: [id number]    Treatment Site:T12-L4 lumbar spine Current Dose: 1200/2000 cGy Fractions: 3/5       DAILY DOSE:     400  cGy/ day,  5 times/week    DISEASE UNDER TREATMENT:      SUBJECTIVE:    Treatment Related Toxicities (CTCAE V4.0 Toxicity Criteria)    Fatigue: Grade 1: Fatigue relieved by rest  Pain Score: 3 /10       GI:  Diarrhea:Grade 0  No change over baseline  [place] Grade 0: No toxicity  Comment:      OBJECTIVE:  Wt 97.5 kg (215 lb)  BMI 29.16 kg/m2      IMPRESSION: The patient is tolerating the treatment.  The patient set up, dose, and cone beam CT images were reviewed.      PLAN: The patient will continue radiotherapy    PAIN MANAGEMENT PLAN:  The patient will continue current pain medication      TEQUILA Kovacs M.D.  Department of Radiation Oncology  [facility]

## 2017-04-24 NOTE — PROGRESS NOTES
Received call from Valerio, he needs a refill of his oxycodone.    Last fill was 4/13/17, he reports he is taking 3-4 tablets every 4 hours.   A refill would be appropriate at 11 days if he was taking 3 tablets q4 hours (today 4/24).    He is wearing his fentanyl patch, he found some relief from the epidural and he is currently getting radiation on his back.     Reviewed with Aleida Hays PA-C who recommends increasing Fentanyl Patch to 100mcg and refilling oxycodone.  Returned call and spoke to Valerio's wife who states understanding.

## 2017-04-25 NOTE — MR AVS SNAPSHOT
After Visit Summary   2017    Naren Kimble    MRN: [id number]           Patient Information     Date Of Birth          1975        Visit Information        Provider Department      2017 10:00 PM Eden Kovacs MD Radiation Oncology Clinic         Follow-ups after your visit        Who to contact     Please call your clinic at [phone] to:    Ask questions about your health    Make or cancel appointments    Discuss your medicines    Learn about your test results    Speak to your doctor   If you have compliments or concerns about an experience at your clinic, or if you wish to file a complaint, please contact [facility] Patient Relations at [phone] or email us at [email or web address]         Additional Information About Your Visit        MyChart Information     [employer] is an electronic gateway that provides easy, online access to your medical records. With [employer], you can request a clinic appointment, read your test results, renew a prescription or communicate with your care team.     To sign up for [employer] visit the website at [email or web address]   You will be asked to enter the access code listed below, as well as some personal information. Please follow the directions to create your username and password.     Your access code is: [email or web address]  Expires: 2017  2:39 PM     Your access code will  in 90 days. If you need help or a new code, please contact your [facility] or call [phone] for assistance.        Care EveryWhere ID     This is your Care EveryWhere ID. This could be used by other organizations to access your [place] medical records  [id number]         Blood Pressure from Last 3 Encounters:   No data found for BP    Weight from Last 3 Encounters:   No data found for Wt              Today, you had the following     No orders found for display       Primary Care  Provider    None Specified       No primary provider on file.        Thank you!     Thank you for choosing RAD[facility]ATION ONCOLOGY CLINIC  for your care. Our goal is always to provide you with excellent care. Hearing back from our patients is one way we can continue to improve our services. Please take a few minutes to complete the written survey that you may receive in the mail after your visit with us. Thank you!             Your Updated Medication List - Protect others around you: Learn how to safely use, store and throw away your medicines at www.disposemymeds.org.          This list is accurate as of: 4/25/17 11:59 PM.  Always use your most recent med list.                   Brand Name Dispense Instructions for use    albuterol 108 (90 BASE) MCG/ACT Inhaler   Generic drug:  albuterol      Inhale 1-2 puffs into the lungs daily as needed       cyclobenzaprine 10 MG tablet    FLEXERIL    90 tablet    Take 1 tablet (10 mg) by mouth 3 times daily as needed for muscle spasms       dexamethasone 4 MG tablet    DECADRON    60 tablet    Take 1 tablet (4 mg) by mouth 2 times daily (with meals)       * fentaNYL 50 mcg/hr 72 hr patch    DURAGESIC    10 patch    Place 1 patch onto the skin every 72 hours       * fentaNYL 25 mcg/hr 72 hr patch    DURAGESIC    10 patch    Place 1 patch onto the skin every 72 hours Please use with 50 mcg patch (total of 75 mcg)       * fentaNYL 100 mcg/hr 72 hr patch    DURAGESIC    10 patch    Place 1 patch onto the skin every 72 hours       FOLIC ACID PO      Take 800 mcg by mouth daily       gabapentin 300 MG capsule    NEURONTIN    90 capsule    Take 1 capsule (300 mg) by mouth 3 times daily Start with 300 mg at night for first 2 nights, then increase to prescribed dose       IBUPROFEN PO      Take 1 tablet by mouth every 8 hours as needed       levETIRAcetam 100 MG/ML solution    KEPPRA     Take 10 mg/kg by mouth 2 times daily       lidocaine 5 % Patch    LIDODERM    30 patch    Apply up to 3  patches to painful area at once for up to 12 h within a 24 h period.  Remove after 12 hours.       magnesium hydroxide 2400 MG/10ML Susp    MOM     Take 5 mLs by mouth daily as needed for constipation Reported on 3/2/2017       ondansetron 8 MG tablet    ZOFRAN    90 tablet    Take 1 tablet (8 mg) by mouth every 8 hours as needed       order for DME     1 Units    Equipment being ordered: Wheelchair       OSCAL 500/200 D-3 PO          oxyCODONE 5 MG capsule    OXY-IR    200 capsule    Take 1-3 capsules (5-15 mg) by mouth every 4 hours as needed for moderate to severe pain       pantoprazole 40 MG EC tablet    PROTONIX    30 tablet    Take 1 tablet (40 mg) by mouth daily       prochlorperazine 10 MG tablet    COMPAZINE    30 tablet    Take 1 tablet (10 mg) by mouth every 6 hours as needed (Nausea/Vomiting)       SENNA CO          TYLENOL PO      Take 500 mg by mouth daily as needed       * Notice:  This list has 3 medication(s) that are the same as other medications prescribed for you. Read the directions carefully, and ask your doctor or other care provider to review them with you.

## 2017-05-01 NOTE — TELEPHONE ENCOUNTER
Writer received call from RN at [facility] with update on patient. Pt has been very sleepy, but able to arouse. No confusion noted. Pt's BP on admit was 74/52 with HR of 130 after 500 cc of fluid BP was 83/56. Pt denies symptoms of dizziness. Pt's O2 sats in the 80's when first arrived to clinic, placed on 4 L of O2 while at rest to maintain sats >90%, when awake pt remains on 2 L O2 to maintains sats >90%. Pt reports shortness of breath, but no chest discomfort. RN stated pt and wife voiced that he has had poor PO intake over the past few days. Pt ambulating via stand by assist. Pt's Hgb reported at 9.6. Writer advised RN to advise patient and wife to be seen and evaluated in local emergency room following infusion for further assessment of vital signs and symptoms.

## 2017-05-01 NOTE — TELEPHONE ENCOUNTER
[facility] Telephone Triage Note    Assessment: Spouse/Partner Jhonatan called in to triage reporting the following symptoms: excessive fatigue, has been sleeping since yesterday AM. When spouse attempts to wake him up for meds and fluids, he is irritable and seems confused. Spouse thinks pt is taking Oxycodone 4 tabs every 4 hours. Was on Fentanyl patch 75 mcg x1 week, just this morning was able to get increased dose of 100 mcg patch. Writer spoke with patient, he stated he was up to urinate 3 hours ago and had supper last night-spouse contradicted that this was not true. Mouth felt dry for past 2 days. Last dose Oxy 7 hours ago, has not felt enough pain to wake up and take more.    Recommendations: Discussed with Aleida ALVES.  Recommended clinic visit today with 1L NS infusion and labs CBC diff, CMP if patient is willing to drive the distance. Otherwise push fluids and continue to monitor, call us tomorrow with update.    Follow-Up:    Spoke with spouse. She stated she'd rather take patient to ER in town for IV fluids. Will call us back tomorrow with update. Has FU apt with Nathaly Lepe and chemo on 5/4.   Instructed patient to seek care immediately for worsening sypmtoms, including: fever, chest pain, shortness of breath, dizziness.  Patient voiced understanding of advice and/or instructions given.

## 2017-05-01 NOTE — TELEPHONE ENCOUNTER
[facility] called to get orders for infusion and labs. Faxed to [phone].

## 2017-05-04 ENCOUNTER — TELEPHONE (OUTPATIENT)
Dept: ONCOLOGY | Facility: CLINIC | Age: 42
End: 2017-05-04

## 2017-05-04 NOTE — TELEPHONE ENCOUNTER
On-call provider received call from [facility] on 5/1/17 reporting patient passed away.   Verified via Care everywhere, patient passed away after reporting to the ED on 5/1/2017    All appointments, orders and treatment plans cancelled.  Care TEAM and HIM notified

## 2017-06-07 NOTE — PROCEDURES
Radiotherapy Treatment Summary          Date of Report: 2017     PATIENT: OBDULIA CHIANG  MEDICAL RECORD NO: [id number]  : 1975     DIAGNOSIS: C79.5 Secondary malignant neoplasm of bone and bone marrow  INTENT OF RADIOTHERAPY: Palliative  PATHOLOGY: NSCLC                                  STAGE: IV  CONCURRENT SYSTEMIC THERAPY:                    Details of the treatments summarized below are found in records kept in the Department of Radiation Oncology at [facility].     Treatment Summary:  Radiation Oncology - Course: 6 Protocol:   Treatment Site Dose  Modality From To Elapsed Days Fx.  6 TL Spine    2,000 cGy 18 X  4/18/2017  2017   7  5          Dose per Fraction: 400 cGy       Total Dose: 2,000 cGy             COMMENTS:                      Mr. Chiang is a 42 year old male with metastatic NSCLC with progressive disease. Recent TL spine MRI   demonstrated worsening diffuse metastatic disease. He was having significant lower back pain as well as   progressive neurologic symptoms in LEs, which correlated to lumbar MRI findings. He therefore underwent a   course of palliative radiotherapy as outlined above. Overall he tolerated his treatment course well without any   immediate acute side effects. He did notice some improvement of his symptoms during his OTV after his third   (out of 5) treatment. There were no days of unplanned treatment breaks.      PAIN MANAGEMENT: He continued with his normal pain regimen during treatment     FOLLOW UP PLAN: We will plan to see him back on a PRN basis                           Resident Physician: Foreign Fraser M.D.   Staff Physician: TEQUILA Kovacs M.D.  Physicist: Maxine Viveros, PhD     CC: ,   Nenita Bartholomew MD                                 Radiation Oncology:  [facility] [address] [place] [address]

## 2021-08-20 NOTE — NURSING NOTE
Valerio JARQUIN is a C2W6 Phase IB/II study of nivolumab in combination with ALT-803 in patients with pretreated, advanced or metastatic non-small cell lung cancer.   Pt met with Dr. Bartholomew today for restaging which showed disease progression.  Pt will be taken off study at this time on 07FEB2017.   End of treatment labs were missed by the lab department because of a time change in the scheduling.  Research will obtain EOT labs on the next visit which is scheduled for 02MAR2017.  His last dose of VLS176 was 31JAN2017.  Reasshaun RN will continue to follow conmeds for 30 days and follow-up post study every 3 months per protocol.   11

## 2022-02-23 NOTE — PROGRESS NOTES
It was a pleasure to see Naren Kimble today in [facility] in consultation for Gamma Knife Radiosurgery. He is a 42 year old year old patient     with a history of lung cancer diagnosed in 2016. Recently, he was noted to have brain metastasis with the following symptoms: none. Had a L frontal lesion at initial diagnoses, resected and cavity treated with SRT. He is taking dexamethasone. He has not had whole brain radiation. The neurosurgical services health history form was reviewed and scanned into the medical record today.    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 Zi Porter, showed metastatic adenocarcinoma, most consistent with lung primary, negative EGFR and ALK gene mutations    7/28/16; CT Chest/Abd/Pelvis, moderate irregular masses lower lung fields, no definitive mass     8/8/16; Bronchoscopy with fine needle aspirations all negative or non diagnostic, but felt tiny lesion left lung may be primary    8/30/16; pt saw Dr. Norm Egan, neurosurgical consult, says no primary lung lesion seen at present, discussed stereotactic radiosurgery to surgical cavity    9/12/16 thru 9/16/16; SRT to tumor resection bed left frontal by Dr. Hartmann at [facility]    9/19/16; Started Cisplatin and Alimta, every 3 weeks plan 6 cycles     Progressed after three cycles     10/18/16; MRI Lumbar Spine, multiple areas concerning for mets, started Zometa    11/2/16; saw Dr. Nenita Bartholomew who recommended clinical trial with ALT-803 plus Nivolumab     Pseudoprogression after 1st cycle     1/16/17 thru 1/20/17; radiation to T spine, sacrum and left hip by Dr. Kovacs    2/6/17; PET, progression of size primary left lung base mass, increase size number of lesions mediastinum/rosalee and adrenal gland, many bone mets      2/7/17; started Taxotere     3/2/17; pt saw Yulia Lepe, due for cycle 2 of taxotere, got zometa, increased pain meds     3/6/17; Brain MRI, new 0.5 x 0.5 x 0.7 cm lesion right occipital (in retrospect on previous MRI 0.4 cm, but felt to be vessel)    New 0.5 cm lesion parasagittal right occipital     Postsurgical changes left frontal crani unchanged    1.1 x 1 cm stable lesion, what is felt to be a cavernoma     Medications:  Current Outpatient Prescriptions   Medication     oxyCODONE (OXY-IR) 5 MG capsule     dexamethasone (DECADRON) 4 MG tablet     cyclobenzaprine (FLEXERIL) 10 MG tablet     prochlorperazine (COMPAZINE) 10 MG tablet     ondansetron (ZOFRAN) 8 MG tablet     lidocaine (LIDODERM) 5 % Patch     fentaNYL (DURAGESIC) 50 mcg/hr 72 hr patch     dexamethasone (DECADRON) 4 MG tablet     albuterol (ALBUTEROL) 108 (90 BASE) MCG/ACT Inhaler     oxyCODONE (OXYCONTIN) 10 MG 12 hr tablet     LORazepam (ATIVAN) 0.5 MG tablet     dexamethasone (DECADRON) 4 MG tablet     IBUPROFEN PO     magnesium hydroxide (MOM) 2400 MG/10ML SUSP     SENNA CO     Calcium Carbonate-Vitamin D (OSCAL 500/200 D-3 PO)     Acetaminophen (TYLENOL PO)     famotidine (PEPCID) 10 MG tablet     levETIRAcetam (KEPPRA) 100 MG/ML solution     FOLIC ACID PO     No current facility-administered medications for this visit.        Allergies:     Allergies   Allergen Reactions     Animal Dander Shortness Of Breath     Cats Shortness Of Breath       Past Medical History:  Past Medical History   Diagnosis Date     Lung cancer (H)      adenocarcinoma, metastatic to brain, bone     Sleep apnea      CPAP       Past Surgical History:  Past Surgical History   Procedure Laterality Date     Craniotomy  7/27/2016     left frontal craniotomy     Biopsy  8/8/2016     bronchoscopy FNA lymph node, hilar  node, paratracheal node       Social History:  Social History     Social History     Marital status:      Spouse name: Jhonatan     Number of children:  2     Years of education: N/A     Occupational History     pharmaceutical           Social History Main Topics     Smoking status: Never Smoker     Smokeless tobacco: Not on file     Alcohol use 0.0 oz/week     0 Standard drinks or equivalent per week      Comment: occasional     Drug use: No     Sexual activity: Not on file     Other Topics Concern     Not on file     Social History Narrative       Family History:  No family history on file.    Review of Systems:  See neurosurgical services health history form.    GENERAL PHYSICAL EXAMINATION:  Handedness:  Right  HENT:   Normocephalic  Well healed L frontal incision    BP (!) 146/98 (BP Location: Right arm, Patient Position: Right side, Cuff Size: Adult Large)  Pulse 129  Temp 96.7  F (35.9  C) (Oral)  Resp 16  Wt 103.6 kg (228 lb 8 oz)  SpO2 98%  BMI 29.34 kg/m2     Appearance: Comfortable and relaxed  LOC and Cognition:  Normal:   Alert and oriented to time, person and place for age, Appropriate and fluent speech for age, Follows commands appropriately for age, Affect pleasant, behavior cooperative, Accurate historian and No expressive or receptive aphasia  Karnofsky Score:    Oswestry Score:    Neck Disability Index Score:      Cranial Nerves:  Normal:   Partial II, III/IV/VI, V, VII, VIII, IX/X, XI, XII Normal:   Pupils 3 mm react briskly and appear symmetric, vision grossly intact, peripheral fields intact, extraocular movements conjugate and full, no ptosis, facial sensation and movements intact and symmetric, swallow and voice quality unremarkable, hearing grossly intact, shoulders shrugs strong and symmetric, and tongue midline.    Motor:  Normal UE/LE:   Strength in upper and lower extremities was 5/5 for bilateral deltoids, biceps, triceps, wrist flexors, wrist extensors, hand intrinsics, bilateral hip flexors, knee extensors, knee flexors, dorsiflexion and plantar flexion except mild HF weakness bilaterally, pain related.    IMAGING:  MRI shows two lesions in R occipital lesion. The imaging was shown to the patient and reviewed in clinic.     ASSESSMENT:     Diagnosis:  Metastatic lung cancer. Brain metastasis    PLAN: I had a discussion with the patient about gamma knife radiosurgical treatment of brain metastasis.  I believe the patient is an appropriate candidate for treatment and discussed other options including but not limited to observation, surgery, WBRT.  I discussed the course of GKRS, including frame placement, MRI imaging and risk of finding new metastatic lesions, treatment planning and delivery of radiation.  Risks of the procedure were discussed including adverse reaction to medications including local anesthetic, risks of frame placement including cranial perforation or movement of the frame during treatment, risks of radiation delivery including injury to brain surrounding the target or inadvertent delivery of radiation to unintended structures, or possibility of being unable to deliver radiation to the intended targets due to the geometry of the Gamma Knife machine and possible need for moving the frame. Questions were solicited and answered.  We will proceed with Gamma Knife radiosurgery in the near future. Please do not hesitate to contact me with questions.      Total time spent on visit was 20 minutes with greater than 15 minutes involved with case coordination and counseling.                Purse String (Intermediate) Text: Given the location of the defect and the characteristics of the surrounding skin a purse string intermediate closure was deemed most appropriate.  Undermining was performed circumfirentially around the surgical defect.  A purse string suture was then placed and tightened.

## 2024-12-27 NOTE — PATIENT INSTRUCTIONS
Continue chemotherapy  Radiation oncology this week  Epidural injection lumbar spine  RTC MD first week of May   No

## (undated) RX ORDER — LORAZEPAM 0.5 MG/1
TABLET ORAL
Status: DISPENSED
Start: 2017-01-01

## (undated) RX ORDER — LIDOCAINE 40 MG/G
CREAM TOPICAL
Status: DISPENSED
Start: 2017-01-01